# Patient Record
Sex: FEMALE | Race: WHITE | NOT HISPANIC OR LATINO | Employment: UNEMPLOYED | URBAN - METROPOLITAN AREA
[De-identification: names, ages, dates, MRNs, and addresses within clinical notes are randomized per-mention and may not be internally consistent; named-entity substitution may affect disease eponyms.]

---

## 2018-02-26 ENCOUNTER — OFFICE VISIT CONVERTED (OUTPATIENT)
Dept: FAMILY MEDICINE CLINIC | Facility: CLINIC | Age: 38
End: 2018-02-26
Attending: FAMILY MEDICINE

## 2018-07-13 ENCOUNTER — OFFICE VISIT CONVERTED (OUTPATIENT)
Dept: FAMILY MEDICINE CLINIC | Facility: CLINIC | Age: 38
End: 2018-07-13
Attending: FAMILY MEDICINE

## 2018-09-10 ENCOUNTER — OFFICE VISIT CONVERTED (OUTPATIENT)
Dept: SURGERY | Facility: CLINIC | Age: 38
End: 2018-09-10
Attending: PHYSICIAN ASSISTANT

## 2018-09-10 ENCOUNTER — CONVERSION ENCOUNTER (OUTPATIENT)
Dept: SURGERY | Facility: CLINIC | Age: 38
End: 2018-09-10

## 2018-10-24 ENCOUNTER — OFFICE VISIT CONVERTED (OUTPATIENT)
Dept: GASTROENTEROLOGY | Facility: CLINIC | Age: 38
End: 2018-10-24
Attending: NURSE PRACTITIONER

## 2018-11-01 ENCOUNTER — OFFICE VISIT CONVERTED (OUTPATIENT)
Dept: FAMILY MEDICINE CLINIC | Facility: CLINIC | Age: 38
End: 2018-11-01
Attending: FAMILY MEDICINE

## 2018-11-15 ENCOUNTER — OFFICE VISIT CONVERTED (OUTPATIENT)
Dept: FAMILY MEDICINE CLINIC | Facility: CLINIC | Age: 38
End: 2018-11-15
Attending: FAMILY MEDICINE

## 2018-12-07 ENCOUNTER — PROCEDURE VISIT CONVERTED (OUTPATIENT)
Dept: UROLOGY | Facility: CLINIC | Age: 38
End: 2018-12-07
Attending: UROLOGY

## 2019-01-11 ENCOUNTER — CONVERSION ENCOUNTER (OUTPATIENT)
Dept: FAMILY MEDICINE CLINIC | Facility: CLINIC | Age: 39
End: 2019-01-11

## 2019-01-11 ENCOUNTER — OFFICE VISIT CONVERTED (OUTPATIENT)
Dept: FAMILY MEDICINE CLINIC | Facility: CLINIC | Age: 39
End: 2019-01-11
Attending: FAMILY MEDICINE

## 2019-02-11 ENCOUNTER — OFFICE VISIT CONVERTED (OUTPATIENT)
Dept: FAMILY MEDICINE CLINIC | Facility: CLINIC | Age: 39
End: 2019-02-11
Attending: FAMILY MEDICINE

## 2019-02-13 ENCOUNTER — HOSPITAL ENCOUNTER (OUTPATIENT)
Dept: OTHER | Facility: HOSPITAL | Age: 39
Discharge: HOME | End: 2019-02-13
Attending: FAMILY MEDICINE

## 2019-03-18 ENCOUNTER — OFFICE VISIT CONVERTED (OUTPATIENT)
Dept: FAMILY MEDICINE CLINIC | Facility: CLINIC | Age: 39
End: 2019-03-18
Attending: FAMILY MEDICINE

## 2019-03-18 ENCOUNTER — HOSPITAL ENCOUNTER (OUTPATIENT)
Dept: FAMILY MEDICINE CLINIC | Facility: CLINIC | Age: 39
Discharge: HOME | End: 2019-03-18
Attending: FAMILY MEDICINE

## 2019-03-18 ENCOUNTER — CONVERSION ENCOUNTER (OUTPATIENT)
Dept: FAMILY MEDICINE CLINIC | Facility: CLINIC | Age: 39
End: 2019-03-18

## 2019-04-23 ENCOUNTER — CONVERSION ENCOUNTER (OUTPATIENT)
Dept: FAMILY MEDICINE CLINIC | Facility: CLINIC | Age: 39
End: 2019-04-23

## 2019-04-23 ENCOUNTER — OFFICE VISIT CONVERTED (OUTPATIENT)
Dept: FAMILY MEDICINE CLINIC | Facility: CLINIC | Age: 39
End: 2019-04-23
Attending: FAMILY MEDICINE

## 2019-06-17 ENCOUNTER — OFFICE VISIT CONVERTED (OUTPATIENT)
Dept: FAMILY MEDICINE CLINIC | Facility: CLINIC | Age: 39
End: 2019-06-17
Attending: NURSE PRACTITIONER

## 2019-07-22 ENCOUNTER — OFFICE VISIT CONVERTED (OUTPATIENT)
Dept: FAMILY MEDICINE CLINIC | Facility: CLINIC | Age: 39
End: 2019-07-22
Attending: NURSE PRACTITIONER

## 2019-07-22 ENCOUNTER — CONVERSION ENCOUNTER (OUTPATIENT)
Dept: FAMILY MEDICINE CLINIC | Facility: CLINIC | Age: 39
End: 2019-07-22

## 2019-07-23 ENCOUNTER — HOSPITAL ENCOUNTER (OUTPATIENT)
Dept: OTHER | Facility: HOSPITAL | Age: 39
Discharge: HOME | End: 2019-07-23
Attending: FAMILY MEDICINE

## 2019-08-09 ENCOUNTER — CONVERSION ENCOUNTER (OUTPATIENT)
Dept: ORTHOPEDIC SURGERY | Facility: CLINIC | Age: 39
End: 2019-08-09

## 2019-08-09 ENCOUNTER — OFFICE VISIT CONVERTED (OUTPATIENT)
Dept: ORTHOPEDIC SURGERY | Facility: CLINIC | Age: 39
End: 2019-08-09
Attending: ORTHOPAEDIC SURGERY

## 2019-09-11 ENCOUNTER — HOSPITAL ENCOUNTER (OUTPATIENT)
Dept: PERIOP | Facility: HOSPITAL | Age: 39
Setting detail: HOSPITAL OUTPATIENT SURGERY
Discharge: HOME | End: 2019-09-11
Attending: ORTHOPAEDIC SURGERY

## 2019-09-11 LAB — HCG UR QL: NEGATIVE

## 2019-09-16 ENCOUNTER — OFFICE VISIT CONVERTED (OUTPATIENT)
Dept: FAMILY MEDICINE CLINIC | Facility: CLINIC | Age: 39
End: 2019-09-16
Attending: FAMILY MEDICINE

## 2019-09-24 ENCOUNTER — OFFICE VISIT CONVERTED (OUTPATIENT)
Dept: ORTHOPEDIC SURGERY | Facility: CLINIC | Age: 39
End: 2019-09-24

## 2019-11-05 ENCOUNTER — CONVERSION ENCOUNTER (OUTPATIENT)
Dept: OTHER | Facility: HOSPITAL | Age: 39
End: 2019-11-05

## 2019-11-05 ENCOUNTER — OFFICE VISIT CONVERTED (OUTPATIENT)
Dept: FAMILY MEDICINE CLINIC | Facility: CLINIC | Age: 39
End: 2019-11-05
Attending: FAMILY MEDICINE

## 2019-12-10 ENCOUNTER — CONVERSION ENCOUNTER (OUTPATIENT)
Dept: FAMILY MEDICINE CLINIC | Facility: CLINIC | Age: 39
End: 2019-12-10

## 2019-12-10 ENCOUNTER — OFFICE VISIT CONVERTED (OUTPATIENT)
Dept: FAMILY MEDICINE CLINIC | Facility: CLINIC | Age: 39
End: 2019-12-10
Attending: FAMILY MEDICINE

## 2020-01-06 ENCOUNTER — HOSPITAL ENCOUNTER (OUTPATIENT)
Dept: GASTROENTEROLOGY | Facility: HOSPITAL | Age: 40
Setting detail: HOSPITAL OUTPATIENT SURGERY
Discharge: HOME | End: 2020-01-06
Attending: INTERNAL MEDICINE

## 2020-01-06 LAB — HCG UR QL: NEGATIVE

## 2020-03-16 ENCOUNTER — OFFICE VISIT CONVERTED (OUTPATIENT)
Dept: FAMILY MEDICINE CLINIC | Facility: CLINIC | Age: 40
End: 2020-03-16
Attending: FAMILY MEDICINE

## 2020-03-16 ENCOUNTER — CONVERSION ENCOUNTER (OUTPATIENT)
Dept: FAMILY MEDICINE CLINIC | Facility: CLINIC | Age: 40
End: 2020-03-16

## 2020-06-02 ENCOUNTER — HOSPITAL ENCOUNTER (OUTPATIENT)
Dept: FAMILY MEDICINE CLINIC | Facility: CLINIC | Age: 40
Discharge: HOME | End: 2020-06-02
Attending: FAMILY MEDICINE

## 2020-06-02 ENCOUNTER — OFFICE VISIT CONVERTED (OUTPATIENT)
Dept: FAMILY MEDICINE CLINIC | Facility: CLINIC | Age: 40
End: 2020-06-02
Attending: FAMILY MEDICINE

## 2020-06-02 LAB
ALBUMIN SERPL-MCNC: 4.3 G/DL (ref 3.5–5)
ALBUMIN/GLOB SERPL: 1.5 {RATIO} (ref 1.4–2.6)
ALP SERPL-CCNC: 95 U/L (ref 42–98)
ALT SERPL-CCNC: 11 U/L (ref 10–40)
ANION GAP SERPL CALC-SCNC: 17 MMOL/L (ref 8–19)
AST SERPL-CCNC: 17 U/L (ref 15–50)
BILIRUB SERPL-MCNC: 0.2 MG/DL (ref 0.2–1.3)
BUN SERPL-MCNC: 16 MG/DL (ref 5–25)
BUN/CREAT SERPL: 16 {RATIO} (ref 6–20)
CALCIUM SERPL-MCNC: 9.6 MG/DL (ref 8.7–10.4)
CHLORIDE SERPL-SCNC: 106 MMOL/L (ref 99–111)
CONV CO2: 21 MMOL/L (ref 22–32)
CONV TOTAL PROTEIN: 7.1 G/DL (ref 6.3–8.2)
CREAT UR-MCNC: 0.98 MG/DL (ref 0.5–0.9)
GFR SERPLBLD BASED ON 1.73 SQ M-ARVRAT: >60 ML/MIN/{1.73_M2}
GLOBULIN UR ELPH-MCNC: 2.8 G/DL (ref 2–3.5)
GLUCOSE SERPL-MCNC: 92 MG/DL (ref 65–99)
OSMOLALITY SERPL CALC.SUM OF ELEC: 289 MOSM/KG (ref 273–304)
POTASSIUM SERPL-SCNC: 4.6 MMOL/L (ref 3.5–5.3)
SODIUM SERPL-SCNC: 139 MMOL/L (ref 135–147)

## 2020-06-22 ENCOUNTER — OFFICE VISIT CONVERTED (OUTPATIENT)
Dept: FAMILY MEDICINE CLINIC | Facility: CLINIC | Age: 40
End: 2020-06-22
Attending: FAMILY MEDICINE

## 2020-07-20 ENCOUNTER — OFFICE VISIT CONVERTED (OUTPATIENT)
Dept: FAMILY MEDICINE CLINIC | Facility: CLINIC | Age: 40
End: 2020-07-20
Attending: FAMILY MEDICINE

## 2020-07-20 ENCOUNTER — CONVERSION ENCOUNTER (OUTPATIENT)
Dept: FAMILY MEDICINE CLINIC | Facility: CLINIC | Age: 40
End: 2020-07-20

## 2020-07-23 ENCOUNTER — HOSPITAL ENCOUNTER (OUTPATIENT)
Dept: OTHER | Facility: HOSPITAL | Age: 40
Discharge: HOME | End: 2020-07-23
Attending: FAMILY MEDICINE

## 2020-08-06 ENCOUNTER — HOSPITAL ENCOUNTER (OUTPATIENT)
Dept: URGENT CARE | Facility: CLINIC | Age: 40
Discharge: HOME | End: 2020-08-06
Attending: PHYSICIAN ASSISTANT

## 2020-08-07 ENCOUNTER — HOSPITAL ENCOUNTER (OUTPATIENT)
Dept: MRI IMAGING | Facility: HOSPITAL | Age: 40
Discharge: HOME | End: 2020-08-07
Attending: FAMILY MEDICINE

## 2020-08-30 ENCOUNTER — HOSPITAL ENCOUNTER (OUTPATIENT)
Dept: URGENT CARE | Facility: CLINIC | Age: 40
Discharge: HOME | End: 2020-08-30
Attending: NURSE PRACTITIONER

## 2020-09-01 LAB
AMPICILLIN SUSC ISLT: <=2
AMPICILLIN+SULBAC SUSC ISLT: <=2
BACTERIA UR CULT: ABNORMAL
CEFAZOLIN SUSC ISLT: <=4
CEFEPIME SUSC ISLT: <=0.12
CEFTAZIDIME SUSC ISLT: <=1
CEFTRIAXONE SUSC ISLT: <=0.25
CIPROFLOXACIN SUSC ISLT: <=0.25
ERTAPENEM SUSC ISLT: <=0.12
GENTAMICIN SUSC ISLT: <=1
LEVOFLOXACIN SUSC ISLT: <=0.12
NITROFURANTOIN SUSC ISLT: <=16
PIP+TAZO SUSC ISLT: <=4
TMP SMX SUSC ISLT: <=20
TOBRAMYCIN SUSC ISLT: <=1

## 2020-09-07 ENCOUNTER — HOSPITAL ENCOUNTER (OUTPATIENT)
Dept: URGENT CARE | Facility: CLINIC | Age: 40
Discharge: HOME | End: 2020-09-07
Attending: EMERGENCY MEDICINE

## 2020-09-09 LAB — BACTERIA SPEC AEROBE CULT: ABNORMAL

## 2020-09-10 ENCOUNTER — TELEPHONE CONVERTED (OUTPATIENT)
Dept: FAMILY MEDICINE CLINIC | Facility: CLINIC | Age: 40
End: 2020-09-10
Attending: FAMILY MEDICINE

## 2020-09-10 LAB — SARS-COV-2 RNA SPEC QL NAA+PROBE: NOT DETECTED

## 2020-10-08 ENCOUNTER — OFFICE VISIT CONVERTED (OUTPATIENT)
Dept: FAMILY MEDICINE CLINIC | Facility: CLINIC | Age: 40
End: 2020-10-08
Attending: FAMILY MEDICINE

## 2020-10-23 ENCOUNTER — OFFICE VISIT CONVERTED (OUTPATIENT)
Dept: FAMILY MEDICINE CLINIC | Facility: CLINIC | Age: 40
End: 2020-10-23
Attending: FAMILY MEDICINE

## 2020-10-23 ENCOUNTER — CONVERSION ENCOUNTER (OUTPATIENT)
Dept: FAMILY MEDICINE CLINIC | Facility: CLINIC | Age: 40
End: 2020-10-23

## 2020-11-19 ENCOUNTER — CONVERSION ENCOUNTER (OUTPATIENT)
Dept: FAMILY MEDICINE CLINIC | Facility: CLINIC | Age: 40
End: 2020-11-19

## 2020-11-19 ENCOUNTER — OFFICE VISIT CONVERTED (OUTPATIENT)
Dept: FAMILY MEDICINE CLINIC | Facility: CLINIC | Age: 40
End: 2020-11-19
Attending: FAMILY MEDICINE

## 2020-12-07 ENCOUNTER — TELEPHONE CONVERTED (OUTPATIENT)
Dept: FAMILY MEDICINE CLINIC | Facility: CLINIC | Age: 40
End: 2020-12-07
Attending: FAMILY MEDICINE

## 2020-12-15 ENCOUNTER — TELEMEDICINE CONVERTED (OUTPATIENT)
Dept: PSYCHIATRY | Facility: CLINIC | Age: 40
End: 2020-12-15
Attending: STUDENT IN AN ORGANIZED HEALTH CARE EDUCATION/TRAINING PROGRAM

## 2020-12-23 ENCOUNTER — TELEMEDICINE CONVERTED (OUTPATIENT)
Dept: PSYCHIATRY | Facility: CLINIC | Age: 40
End: 2020-12-23
Attending: STUDENT IN AN ORGANIZED HEALTH CARE EDUCATION/TRAINING PROGRAM

## 2021-01-06 ENCOUNTER — TELEMEDICINE CONVERTED (OUTPATIENT)
Dept: PSYCHIATRY | Facility: CLINIC | Age: 41
End: 2021-01-06
Attending: STUDENT IN AN ORGANIZED HEALTH CARE EDUCATION/TRAINING PROGRAM

## 2021-01-21 ENCOUNTER — TELEMEDICINE CONVERTED (OUTPATIENT)
Dept: PSYCHIATRY | Facility: CLINIC | Age: 41
End: 2021-01-21
Attending: STUDENT IN AN ORGANIZED HEALTH CARE EDUCATION/TRAINING PROGRAM

## 2021-02-12 ENCOUNTER — TELEMEDICINE CONVERTED (OUTPATIENT)
Dept: PSYCHIATRY | Facility: CLINIC | Age: 41
End: 2021-02-12
Attending: STUDENT IN AN ORGANIZED HEALTH CARE EDUCATION/TRAINING PROGRAM

## 2021-03-02 ENCOUNTER — OFFICE VISIT CONVERTED (OUTPATIENT)
Dept: FAMILY MEDICINE CLINIC | Facility: CLINIC | Age: 41
End: 2021-03-02
Attending: FAMILY MEDICINE

## 2021-03-11 ENCOUNTER — TELEMEDICINE CONVERTED (OUTPATIENT)
Dept: PSYCHIATRY | Facility: CLINIC | Age: 41
End: 2021-03-11
Attending: STUDENT IN AN ORGANIZED HEALTH CARE EDUCATION/TRAINING PROGRAM

## 2021-03-16 ENCOUNTER — HOSPITAL ENCOUNTER (OUTPATIENT)
Dept: FAMILY MEDICINE CLINIC | Facility: CLINIC | Age: 41
Discharge: HOME | End: 2021-03-16
Attending: FAMILY MEDICINE

## 2021-03-16 ENCOUNTER — CONVERSION ENCOUNTER (OUTPATIENT)
Dept: OTHER | Facility: HOSPITAL | Age: 41
End: 2021-03-16

## 2021-03-16 ENCOUNTER — OFFICE VISIT CONVERTED (OUTPATIENT)
Dept: FAMILY MEDICINE CLINIC | Facility: CLINIC | Age: 41
End: 2021-03-16
Attending: FAMILY MEDICINE

## 2021-03-16 LAB
BASOPHILS # BLD AUTO: 0.02 10*3/UL (ref 0–0.2)
BASOPHILS NFR BLD AUTO: 0.3 % (ref 0–3)
CONV ABS IMM GRAN: 0.02 10*3/UL (ref 0–0.2)
CONV IMMATURE GRAN: 0.3 % (ref 0–1.8)
DEPRECATED RDW RBC AUTO: 58.3 FL (ref 36.4–46.3)
EOSINOPHIL # BLD AUTO: 0.04 10*3/UL (ref 0–0.7)
EOSINOPHIL # BLD AUTO: 0.6 % (ref 0–7)
ERYTHROCYTE [DISTWIDTH] IN BLOOD BY AUTOMATED COUNT: 15.1 % (ref 11.7–14.4)
HCT VFR BLD AUTO: 39.3 % (ref 37–47)
HGB BLD-MCNC: 12.6 G/DL (ref 12–16)
INR PPP: 0.99 (ref 2–3)
LYMPHOCYTES # BLD AUTO: 1.8 10*3/UL (ref 1–5)
LYMPHOCYTES NFR BLD AUTO: 27.8 % (ref 20–45)
MCH RBC QN AUTO: 33.1 PG (ref 27–31)
MCHC RBC AUTO-ENTMCNC: 32.1 G/DL (ref 33–37)
MCV RBC AUTO: 103.1 FL (ref 81–99)
MONOCYTES # BLD AUTO: 0.42 10*3/UL (ref 0.2–1.2)
MONOCYTES NFR BLD AUTO: 6.5 % (ref 3–10)
NEUTROPHILS # BLD AUTO: 4.18 10*3/UL (ref 2–8)
NEUTROPHILS NFR BLD AUTO: 64.5 % (ref 30–85)
NRBC CBCN: 0 % (ref 0–0.7)
PLATELET # BLD AUTO: 254 10*3/UL (ref 130–400)
PMV BLD AUTO: 10.4 FL (ref 9.4–12.3)
PROTHROMBIN TIME: 10.9 S (ref 9.4–12)
RBC # BLD AUTO: 3.81 10*6/UL (ref 4.2–5.4)
T4 FREE SERPL-MCNC: 1.2 NG/DL (ref 0.9–1.8)
TSH SERPL-ACNC: 1.51 M[IU]/L (ref 0.27–4.2)
WBC # BLD AUTO: 6.48 10*3/UL (ref 4.8–10.8)

## 2021-03-21 ENCOUNTER — HOSPITAL ENCOUNTER (OUTPATIENT)
Dept: URGENT CARE | Facility: CLINIC | Age: 41
Discharge: HOME | End: 2021-03-21
Attending: EMERGENCY MEDICINE

## 2021-03-26 ENCOUNTER — OFFICE VISIT CONVERTED (OUTPATIENT)
Dept: FAMILY MEDICINE CLINIC | Facility: CLINIC | Age: 41
End: 2021-03-26
Attending: FAMILY MEDICINE

## 2021-04-06 ENCOUNTER — OFFICE VISIT CONVERTED (OUTPATIENT)
Dept: FAMILY MEDICINE CLINIC | Facility: CLINIC | Age: 41
End: 2021-04-06
Attending: FAMILY MEDICINE

## 2021-04-14 ENCOUNTER — TELEMEDICINE CONVERTED (OUTPATIENT)
Dept: PSYCHIATRY | Facility: CLINIC | Age: 41
End: 2021-04-14
Attending: STUDENT IN AN ORGANIZED HEALTH CARE EDUCATION/TRAINING PROGRAM

## 2021-04-16 ENCOUNTER — CONVERSION ENCOUNTER (OUTPATIENT)
Dept: FAMILY MEDICINE CLINIC | Facility: CLINIC | Age: 41
End: 2021-04-16

## 2021-04-16 ENCOUNTER — OFFICE VISIT CONVERTED (OUTPATIENT)
Dept: FAMILY MEDICINE CLINIC | Facility: CLINIC | Age: 41
End: 2021-04-16
Attending: FAMILY MEDICINE

## 2021-04-19 ENCOUNTER — CONVERSION ENCOUNTER (OUTPATIENT)
Dept: FAMILY MEDICINE CLINIC | Facility: CLINIC | Age: 41
End: 2021-04-19

## 2021-04-19 ENCOUNTER — OFFICE VISIT CONVERTED (OUTPATIENT)
Dept: FAMILY MEDICINE CLINIC | Facility: CLINIC | Age: 41
End: 2021-04-19
Attending: FAMILY MEDICINE

## 2021-04-28 ENCOUNTER — TELEMEDICINE CONVERTED (OUTPATIENT)
Dept: PSYCHIATRY | Facility: CLINIC | Age: 41
End: 2021-04-28
Attending: STUDENT IN AN ORGANIZED HEALTH CARE EDUCATION/TRAINING PROGRAM
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
## 2021-05-13 ENCOUNTER — TELEMEDICINE CONVERTED (OUTPATIENT)
Dept: PSYCHIATRY | Facility: CLINIC | Age: 41
End: 2021-05-13
Attending: STUDENT IN AN ORGANIZED HEALTH CARE EDUCATION/TRAINING PROGRAM

## 2021-05-13 NOTE — PROGRESS NOTES
Progress Note      Patient Name: Elly Barrett   Patient ID: [id number]   Sex: Female   YOB: 1980    Primary Care Provider: Sofie Ku MD   Referring Provider: Sofie Ku MD    Visit Date: 2020    Provider: Sofie Ku MD   Location: [facility]   Location Address: [address] [place] [address], [address] [place] [address]  [id number]   Location Phone: [phone]          Chief Complaint  · Grieving due to sudden loss of her       History Of Present Illness  Elly Barrett is a 39 year old /White female who presents for evaluation and treatment of:      Pt reports her   yesterday morning in bed.  She is very distraught and crying. Pt reports she can't eat or sleep and feels she was not able to help him even thought she was at home.  Pt daughter was also home during the time and is in the other patient room with the grandmother who drove into town to be with them.  Pt reports she doesn't know what she will do now and they were currently living with a friend and splitting the rent but now that her  is gone they will not be able to afford to cover their part of the rent.  I am giving them a resource packet for women and families and numbers to call.  I am also recommending grief counseling to help her with this difficult time. She assures me she will not harm herself even though she has thought about it.       Past Medical History  Abdominal pain; Allergies; Allergy, Unspecified; Anxiety; Asthma; Bipolar Disorder; Chronic idiopathic constipation; Constipation; Depression; Epigastric pain; Hemorrhoids, Unspecified, without Complications; Microscopic hematuria; Migraines; Nausea and vomiting; Reflux; Seasonal allergies; Tobacco abuse         Past Surgical History  Breast augmentation; Cesarian Section; EGD         Medication List  albuterol sulfate 2.5 mg /3 mL (0.083 %) inhalation solution for nebulization; Chantix Continuing Month Box  Pt slept most of day. GI cocktail given. Diet encouraged. 1 mg oral tablet; Depakote 125 mg oral tablet,delayed release (DR/EC); diazepam 5 mg oral tablet; fluticasone propionate 50 mcg/actuation nasal spray,suspension; Lamictal 200 mg oral tablet; montelukast 10 mg oral tablet; promethazine 25 mg oral tablet; quetiapine 200 mg oral tablet; Restoril 30 mg oral capsule; Spiriva Respimat 1.25 mcg/actuation inhalation mist         Allergy List  aspirin; Egg; Keflex; NSAIDS; PENICILLINS       Allergies Reconciled  Family Medical History  Heart Disease; CVA (Cerebrovascular accident); Diabetes, unspecified type; Osteoporosis; No family history of colorectal cancer; Family history of Arthritis         Reproductive History   0 Para 0 0 0 0       Social History  Alcohol Use (Current - status unknown); Claustophobic (Unknown); Homemaker.; lives with children; lives with spouse; ; .; Recreational Drug Use (Never); Tobacco (Current every day); Unemployed.         Immunizations  Name Date Admin   Influenza Patient had disease   Influenza Patient had disease         Review of Systems  · Constitutional  o Denies  o : fatigue, night sweats  · Eyes  o Denies  o : double vision, blurred vision  · HENT  o Denies  o : vertigo, recent head injury  · Breasts  o Denies  o : abnormal changes in breast size, additional breast symptoms except as noted in the HPI  · Cardiovascular  o Denies  o : chest pain, irregular heart beats  · Respiratory  o Denies  o : shortness of breath, productive cough  · Gastrointestinal  o Denies  o : nausea, vomiting  · Genitourinary  o Denies  o : dysuria, urinary retention  · Integument  o Denies  o : hair growth change, new skin lesions  · Neurologic  o Denies  o : altered mental status, seizures  · Musculoskeletal  o Denies  o : joint swelling, limitation of motion  · Endocrine  o Denies  o : cold intolerance, heat intolerance  · Psychiatric  o Admits  o : anxiety, depression, feeling confused, difficulty sleeping  · Heme-Lymph  o Denies  o :  "petechiae, lymph node enlargement or tenderness  · Allergic-Immunologic  o Denies  o : frequent illnesses      Vitals  Date Time BP Position Site L\R Cuff Size HR RR TEMP (F) WT  HT  BMI kg/m2 BSA m2 O2 Sat FR L/min FiO2 HC       10/08/2020 11:24 /70 Sitting    94 - R  98.5 151lbs 0oz 5'  7\" 23.65 1.8 96 %            Physical Examination  · Constitutional  o Appearance  o : well-nourished, in no acute distress  · Eyes  o Conjunctivae  o : conjunctivae normal  o Sclerae  o : sclerae white  o Pupils and Irises  o : pupils equal, round, and reactive to light and accommodation bilaterally  o Eyelids/Ocular Adnexae  o : eyelid appearance normal, no exudates present  · Ears, Nose, Mouth and Throat  o Ears  o :   § External Ears  § : external auditory canal appearance within normal limits, no discharge present  § Otoscopic Examination  § : tympanic membrane appearance within normal limits bilaterally  o Nose  o :   § External Nose  § : appearance normal  § Nasopharynx  § : no discharge present  o Oral Cavity  o :   § Oral Mucosa  § : oral mucosa normal  § Lips  § : lip appearance normal  o Throat  o :   § Oropharynx  § : no inflammation or lesions present, tonsils within normal limits  · Neck  o Inspection/Palpation  o : normal appearance, no masses or tenderness, trachea midline  o Range of Motion  o : cervical range of motion within normal limits  o Thyroid  o : gland size normal, nontender, no nodules or masses present on palpation  o Jugular Veins  o : JVP normal  · Respiratory  o Respiratory Effort  o : breathing unlabored  o Inspection of Chest  o : normal appearance  o Auscultation of Lungs  o : normal breath sounds throughout  · Cardiovascular  o Heart  o :   § Auscultation of Heart  § : regular rate and rhythm, no murmurs, gallops or rubs  o Peripheral Vascular System  o :   § Extremities  § : no edema  · Gastrointestinal  o Abdominal Examination  o : abdomen nontender to palpation, tone normal without " rigidity or guarding, no masses present, bowel sounds present in all four quadrants  o Liver and spleen  o : no hepatomegaly present, liver nontender to palpation, spleen not palpable  o Hernias  o : no hernias present  · Lymphatic  o Neck  o : no lymphadenopathy present  · Musculoskeletal  o Spine  o :   § Inspection/Palpation  § : no spinal tenderness, scoliosis or kyphosis present  § Stability  § : no subluxations present  § Range of Motion  § : spine range of motion normal  o Right Upper Extremity  o :   § Inspection/Palpation  § : no tenderness to palpation, ROM normal  o Left Upper Extremity  o :   § Inspection/Palpation  § : no tenderness to palpation, ROM normal  o Right Lower Extremity  o :   § Inspection/Palpation  § : no joint or limb tenderness to palpation, ROM normal  o Left Lower Extremity  o :   § Inspection/Palpation  § : no joint or limb tenderness to palpation, ROM normal  · Skin and Subcutaneous Tissue  o General Inspection  o : no rashes or lesions present, no areas of discoloration  o Body Hair  o : scalp palpation normal, hair normal for age, general body hair distribution normal for age  o Digits and Nails  o : no clubbing, cyanosis, deformities or edema present, normal appearing nails  · Neurologic  o Mental Status Examination  o :   § Orientation  § : grossly oriented to person, place and time  o Gait and Station  o : normal gait, able to stand without difficulty  · Psychiatric  o Judgement and Insight  o : judgment and insight intact  o Mood and Affect  o : mood normal, affect appropriate          Assessment  · Grieving     309.0/F43.21      Plan  · Orders  o ACO-39: Current medications updated and reviewed (, 1159F) - - 10/08/2020  o ACO-14: Influenza immunization was not administered for reasons documented [facility] () - - 10/08/2020   patient declined  · Medications  o Medications have been Reconciled  o Transition of Care or Provider Policy  · Instructions  o Patient was  educated/instructed on their diagnosis, treatment and medications prior to discharge from the clinic today.  o Minutes spent with patient including greater than 50% in Education/Counseling/Care Coordination.  o Time spent with the patient was minutes, more than 50% face to face. 25 minutes  · Disposition  o Return Visit Request in/on 2 weeks +/- 0 days ([address]).            Electronically Signed by: Sofie Ku MD -Author on October 23, 2020 08:27:08 AM

## 2021-05-13 NOTE — PROGRESS NOTES
"   Progress Note      Patient Name: Elly Barrett   Patient ID: [id number]   Sex: Female   YOB: 1980    Primary Care Provider: Sofie Ku MD   Referring Provider: Sofie Ku MD    Visit Date: December 7, 2020    Provider: Sofie Ku MD   Location: [facility] - [place], [place]   Location Address: [address] [place] [address], [address] [place] [address]  [id number]   Location Phone: [phone]          Chief Complaint  · Depression      History Of Present Illness  TELEHEALTH TELEPHONE VISIT  Elly Barrett is a 40 year old /White female who is presenting for evaluation via telehealth telephone visit. Verbal consent obtained before beginning visit.   Provider spent 25 minutes with patient during telehealth visit.   The following staff were present during this visit: Catherine Carpio   Past Medical History/Overview of Patient Symptoms     Pt reports she is hearing voices telling her to \"kill herself\".  Pt reports she would never do Anything like that because she would not want her daughter to find her body like she had to find her 's.  Patient reports that she is willing to get counseling and we are putting in a referral for her to go see the psychiatrist.  Patient reports that she will not do any harm to herself and that she had give away her guns.       Past Medical History  Abdominal Pain; Allergies; Allergy, Unspecified; Anxiety; Asthma; Bipolar Disorder; Chronic idiopathic constipation; Constipation; Depression; Epigastric pain; Hemorrhoids, Unspecified, without Complications; Microscopic hematuria; Migraines; Nausea and vomiting; Reflux; Seasonal allergies; Tobacco abuse         Past Surgical History  Breast augmentation; Cesarian Section; EGD         Medication List  albuterol sulfate 2.5 mg /3 mL (0.083 %) inhalation solution for nebulization; azithromycin 250 mg oral tablet; Chantix Continuing Month Box 1 mg oral tablet; Chantix Continuing Month Constantine 1 MG Oral Tablet; " fluticasone propionate 50 mcg/actuation nasal spray,suspension; lamotrigine 200 mg oral tablet; montelukast 10 mg oral tablet; promethazine 25 mg oral tablet; quetiapine 200 mg oral tablet; Restoril 30 mg oral capsule; Spiriva Respimat 1.25 mcg/actuation inhalation mist         Allergy List  aspirin; Egg; Keflex; NSAIDS; PENICILLINS         Family Medical History  Heart Disease; CVA (Cerebrovascular accident); Diabetes, unspecified type; Osteoporosis; No family history of colorectal cancer; Family history of Arthritis         Reproductive History   0 Para 0 0 0 0       Social History  Alcohol Use (Current - status unknown); Claustophobic (Unknown); Homemaker.; lives with children; lives with spouse; ; .; Recreational Drug Use (Never); Tobacco (Current every day); Unemployed.         Immunizations  Name Date Admin   Influenza Patient had disease   Influenza Patient had disease         Review of Systems  · Constitutional  o Denies  o : fatigue, fever, weight loss, weight gain  · Eyes  o Denies  o : eye discomfort, eye pain  · HENT  o Denies  o : vertigo, nasal congestion  · Genitourinary  o Denies  o : frequency, dysuria  · Integument  o Denies  o : rash, itching  · Neurologic  o Denies  o : muscular weakness, memory difficulties  · Musculoskeletal  o Denies  o : joint swelling, muscle pain  · Psychiatric  o Admits  o : anxiety, depression, hallucinations  · Heme-Lymph  o Denies  o : lightheadedness, easy bleeding  · Allergic-Immunologic  o Denies  o : sinus allergy symptoms, allergic dermatitis          Assessment  · Major depressive disorder     296.20/F32.2  · Hallucinations     780.1/R44.3      Plan  · Orders  o Psychiatric Consultation (PSYCH) - 296.20/F32.2 - 12/15/2020   pt should be scheduled with Dr. Dupree   o ACO-39: Current medications updated and reviewed (1159M, ) - - 12/15/2020  o Physician Telephone Evaluation, 21-30 minutes ([id number]) - 780.1/R44.3, 296.20/F32.2 -  12/15/2020  · Medications  o Medications have been Reconciled  o Transition of Care or Provider Policy  · Instructions  o Plan Of Care:   o Chronic conditions reviewed and taken into consideration for today's treatment plan.  · Disposition  o Call or Return if symptoms worsen or persist.  o Care Transition            Electronically Signed by: Sofie Ku MD -Author on December 15, 2020 04:58:47 PM

## 2021-05-13 NOTE — PROGRESS NOTES
"   Progress Note      Patient Name: Elly Barrett   Patient ID: [id number]   Sex: Female   YOB: 1980    Primary Care Provider: Sofie Ku MD   Referring Provider: Sofie Ku MD    Visit Date: June 2, 2020    Provider: Sofie Ku MD   Location: [facility]   Location Address: [address] [place] [address], [address] [place] [address]  [id number]   Location Phone: [phone]          Chief Complaint  · \"Mental Issues\"      History Of Present Illness  Elly Barrett is a 39 year old /White female who presents for evaluation and treatment of:      Bipolar depressionpatient reports that she has had more anxiety recently with her daughter acting out.  Patient has a teenage daughter who is in a relationship that is encouraging her to move to [place] and get .  Patient is been having daily arguments with her daughter which has increased her anxiety levels.  Patient reports that she is tried taking the Valium 10 mg but it makes her a little drowsy so we are decreasing her down to 5 mg as needed.    Asthma exacerbationpatient is wheezing on exam and reports that she needs to have a refill of her Xopenex for her nebulizer machine.  Patient also needs a prescription for tubing and mouthpiece.  I will be sending her antibiotic also to the pharmacy.       Past Medical History  Abdominal pain; Allergies; Allergy, Unspecified; Anxiety; Asthma; Bipolar Disorder; Chronic idiopathic constipation; Constipation; Depression; Epigastric pain; Hemorrhoids, Unspecified, without Complications; Microscopic hematuria; Migraines; Nausea and vomiting; Reflux; Seasonal allergies; Tobacco abuse         Past Surgical History  Breast augmentation; Cesarian Section; EGD         Medication List  Chantix Continuing Month Box 1 mg oral tablet; diazepam 5 mg oral tablet; fluticasone propionate 50 mcg/actuation nasal spray,suspension; Lamictal 200 mg oral tablet; montelukast 10 mg oral tablet; promethazine 25 mg oral " "tablet; quetiapine 200 mg oral tablet; Restoril 30 mg oral capsule         Allergy List  aspirin; Egg; Keflex; NSAIDS; PENICILLINS         Family Medical History  Heart Disease; CVA (Cerebrovascular accident); Diabetes, unspecified type; Osteoporosis; No family history of colorectal cancer; Family history of Arthritis         Reproductive History   0 Para 0 0 0 0       Social History  Alcohol Use (Current - status unknown); Claustophobic (Unknown); Homemaker.; lives with children; lives with spouse; ; .; Recreational Drug Use (Never); Tobacco (Current every day); Unemployed.         Immunizations  Name Date Admin   Influenza Patient had disease         Review of Systems  · Constitutional  o Denies  o : fatigue, fever, weight loss, weight gain  · Eyes  o Denies  o : eye discomfort, eye pain  · HENT  o Admits  o : ear fullness  o Denies  o : vertigo, nasal congestion  · Cardiovascular  o Denies  o : chest pain, irregular heart beats  · Respiratory  o Admits  o : wheezing  o Denies  o : shortness of breath, cough  · Gastrointestinal  o Denies  o : nausea, vomiting  · Genitourinary  o Denies  o : frequency, dysuria  · Integument  o Denies  o : rash, itching  · Neurologic  o Denies  o : muscular weakness, memory difficulties  · Musculoskeletal  o Denies  o : joint swelling, muscle pain  · Psychiatric  o Admits  o : anxiety, depression  · Heme-Lymph  o Denies  o : lightheadedness, easy bleeding  · Allergic-Immunologic  o Denies  o : sinus allergy symptoms, allergic dermatitis      Vitals  Date Time BP Position Site L\R Cuff Size HR RR TEMP (F) WT  HT  BMI kg/m2 BSA m2 O2 Sat        2020 08:45 /78 Sitting    97 - R 14 98 162lbs 8oz 5'  7\" 25.45 1.87 97 %          Physical Examination  · Constitutional  o Appearance  o : well-nourished, in no acute distress  · Eyes  o Conjunctivae  o : conjunctivae normal  o Sclerae  o : sclerae white  o Pupils and Irises  o : pupils equal, round, and " reactive to light and accommodation bilaterally  o Eyelids/Ocular Adnexae  o : eyelid appearance normal, no exudates present  · Ears, Nose, Mouth and Throat  o Ears  o :   § External Ears  § : external auditory canal appearance within normal limits, no discharge present  § Otoscopic Examination  § : fluid present behind tympanic membrane right ear  o Nose  o :   § External Nose  § : appearance normal  § Nasopharynx  § : no discharge present  o Oral Cavity  o :   § Oral Mucosa  § : oral mucosa normal  § Lips  § : lip appearance normal  o Throat  o :   § Oropharynx  § : no inflammation or lesions present, tonsils within normal limits  · Neck  o Inspection/Palpation  o : normal appearance, no masses or tenderness, trachea midline  o Range of Motion  o : cervical range of motion within normal limits  o Thyroid  o : gland size normal, nontender, no nodules or masses present on palpation  o Jugular Veins  o : JVP normal  · Respiratory  o Respiratory Effort  o : breathing unlabored  o Inspection of Chest  o : normal appearance  o Auscultation of Lungs  o : normal breath sounds throughout  · Cardiovascular  o Heart  o :   § Auscultation of Heart  § : regular rate and rhythm, no murmurs, gallops or rubs  o Peripheral Vascular System  o :   § Extremities  § : no edema  · Gastrointestinal  o Abdominal Examination  o : abdomen nontender to palpation, tone normal without rigidity or guarding, no masses present, bowel sounds present in all four quadrants  o Liver and spleen  o : no hepatomegaly present, liver nontender to palpation, spleen not palpable  o Hernias  o : no hernias present  · Lymphatic  o Neck  o : no lymphadenopathy present  · Musculoskeletal  o Spine  o :   § Inspection/Palpation  § : no spinal tenderness, scoliosis or kyphosis present  § Stability  § : no subluxations present  § Range of Motion  § : spine range of motion normal  o Right Upper Extremity  o :   § Inspection/Palpation  § : no tenderness to  palpation, ROM normal  o Left Upper Extremity  o :   § Inspection/Palpation  § : no tenderness to palpation, ROM normal  o Right Lower Extremity  o :   § Inspection/Palpation  § : no joint or limb tenderness to palpation, ROM normal  o Left Lower Extremity  o :   § Inspection/Palpation  § : no joint or limb tenderness to palpation, ROM normal  · Skin and Subcutaneous Tissue  o General Inspection  o : no rashes or lesions present, no areas of discoloration  o Body Hair  o : scalp palpation normal, hair normal for age, general body hair distribution normal for age  o Digits and Nails  o : no clubbing, cyanosis, deformities or edema present, normal appearing nails  · Neurologic  o Mental Status Examination  o :   § Orientation  § : grossly oriented to person, place and time  o Gait and Station  o : normal gait, able to stand without difficulty  · Psychiatric  o Judgement and Insight  o : judgment and insight intact  o Mood and Affect  o : mood normal, affect appropriate              Assessment  · Screening for depression     V79.0/Z13.89  · Bipolar Disorder     296.40  psychiatric care  · Anxiety     300.02/F41.1  · Asthma exacerbation     493.92/J45.901      Plan  · Orders  o CMP [facility] ([id number]) - 296.40, 300.02/F41.1 - 06/02/2020  o IM/SQ - Injection Fee [facility] ([id number]) - V79.0/Z13.89, 296.40, 300.02/F41.1, 493.92/J45.901 - 06/02/2020  o ACO-17: Screened for tobacco use AND received tobacco cessation intervention (4004F) - - 06/02/2020  o ACO-39: Current medications updated and reviewed () - - 06/02/2020  o ACO-18: Positive screen for clinical depression using a standardized tool and a follow-up plan documented () - - 06/02/2020  o ACO-14: Influenza immunization was not administered for reasons documented () - - 06/02/2020   declined  o Solumedrol 125 () - 493.92/J45.901 - 06/02/2020   LOT:[id number] EXP[facility]20[facility]   Injection - Solu-Medrol 125 mg; Dose: 125 mg; Site: Left  Gluteus; Route: intramuscular; Date: 06/02/2020 10:42:30; Exp: 02/01/2022; Lot: [id number]; Mfg: N/A; TradeName: Solu-Medrol; Location: [facility]; Administered By: Anna Ni MA; Comment: tolerated well  · Medications  o Xopenex 1.25 mg/3 mL inhalation solution for nebulization   SIG: inhale 3 milliliters (1.25 mg) by nebulization route 3 times per day for 30 days   DISP: (1) Box with 2 refills  Prescribed on 06/02/2020     o azithromycin 250 mg oral tablet   SIG: take 2 tablets (500 mg) by oral route once daily for 1 day then 1 tablet (250 mg) by oral route once daily for 4 days   DISP: (6) tablets with 0 refills  Prescribed on 06/02/2020     o Lamictal 200 mg oral tablet   SIG: take 2 tablets (400 mg) by oral route once daily for 30 days   DISP: (60) tablet with 11 refills  Prescribed on 06/02/2020     o diazepam 5 mg oral tablet   SIG: take 1 tablet by oral route 3 times a day for 30 days   DISP: (60) tablets with 2 refills  Adjusted on 06/02/2020     o quetiapine 200 mg oral tablet   SIG: TAKE 1 TABLET BY MOUTH ONCE DAILY AT BEDTIME FOR 90 DAYS for 90 days   DISP: (90) Each with 4 refills  Refilled on 06/02/2020     o Medications have been Reconciled  o Transition of Care or Provider Policy  · Instructions  o Depression Screen completed and scanned into the EMR under the designated folder within the patient's documents.  o Today's PHQ-9 result is _13__  o Patient was educated/instructed on their diagnosis, treatment and medications prior to discharge from the clinic today.  o Minutes spent with patient including greater than 50% in Education/Counseling/Care Coordination.  o Time spent with the patient was minutes, more than 50% face to face. 25 minutes  · Disposition  o f/u 3 months            Electronically Signed by: Sofie Ku MD -Author on June 9, 2020 11:17:59 AM

## 2021-05-13 NOTE — PROGRESS NOTES
Progress Note      Patient Name: Elly Barrett   Patient ID: [id number]   Sex: Female   YOB: 1980    Primary Care Provider: Sofie Ku MD   Referring Provider: Sofie Ku MD    Visit Date: September 10, 2020    Provider: Sofie Ku MD   Location: [facility] [place]   Location Address: [address] [place] [address], [address] [place] [address]  [id number]   Location Phone: [phone]          Chief Complaint  · Possible COVID symptoms      History Of Present Illness  TELEHEALTH TELEPHONE VISIT  Elly Barrett is a 39 year old /White female who is presenting for evaluation via telehealth telephone visit. Verbal consent obtained before beginning visit.   Provider spent 25 minutes with patient during telehealth visit.   The following staff were present during this visit: Catherine Carpio   Past Medical History/Overview of Patient Symptoms     She reports that she has went to [facility] on September 7, 2020 for diarrhea and sore throat.  Patient strep test was positive for group B strep she was started on azithromycin and reports that she still has some bloody diarrhea but has not felt the toilet.  She is dehydrated I encouraged her to use a brat diet to help slow down her diarrhea and hopefully with the completion of her antibiotics she will feel better.  Patient will follow-up as needed.  Pt COVID test was negative.       Past Medical History  Abdominal pain; Allergies; Allergy, Unspecified; Anxiety; Asthma; Bipolar Disorder; Chronic idiopathic constipation; Constipation; Depression; Epigastric pain; Hemorrhoids, Unspecified, without Complications; Microscopic hematuria; Migraines; Nausea and vomiting; Reflux; Seasonal allergies; Tobacco abuse         Past Surgical History  Breast augmentation; Cesarian Section; EGD         Medication List  albuterol sulfate 2.5 mg /3 mL (0.083 %) inhalation solution for nebulization; Chantix Continuing Month Box 1 mg oral tablet; diazepam 5 mg oral  tablet; fluticasone propionate 50 mcg/actuation nasal spray,suspension; Lamictal 200 mg oral tablet; montelukast 10 mg oral tablet; promethazine 25 mg oral tablet; quetiapine 200 mg oral tablet; Restoril 30 mg oral capsule; Spiriva Respimat 1.25 mcg/actuation inhalation mist         Allergy List  aspirin; Egg; Keflex; NSAIDS; PENICILLINS         Family Medical History  Heart Disease; CVA (Cerebrovascular accident); Diabetes, unspecified type; Osteoporosis; No family history of colorectal cancer; Family history of Arthritis         Reproductive History   0 Para 0 0 0 0       Social History  Alcohol Use (Current - status unknown); Claustophobic (Unknown); Homemaker.; lives with children; lives with spouse; ; .; Recreational Drug Use (Never); Tobacco (Current every day); Unemployed.         Immunizations  Name Date Admin   Influenza Patient had disease         Review of Systems  · Constitutional  o Denies  o : fatigue, fever, weight loss, weight gain  · Eyes  o Denies  o : eye discomfort, eye pain  · HENT  o Denies  o : vertigo, nasal congestion  · Cardiovascular  o Denies  o : chest pain, irregular heart beats  · Respiratory  o Denies  o : shortness of breath, wheezing  · Gastrointestinal  o Admits  o : diarrhea  o Denies  o : nausea  · Genitourinary  o Denies  o : frequency, dysuria  · Integument  o Denies  o : rash, itching  · Neurologic  o Denies  o : muscular weakness, memory difficulties  · Musculoskeletal  o Denies  o : joint swelling, muscle pain  · Psychiatric  o Denies  o : anxiety, depression  · Heme-Lymph  o Denies  o : lightheadedness, easy bleeding  · Allergic-Immunologic  o Denies  o : sinus allergy symptoms, allergic dermatitis          Assessment  · Diarrhea     787.91/R19.7  · Group B streptococcal infection     041.02/A49.1      Plan  · Orders  o ACO-39: Current medications updated and reviewed () - - 09/10/2020  o Physician Telephone Evaluation, 21-30 minutes ([id number]) -  041.02/A49.1, 787.91/R19.7 - 09/10/2020  · Instructions  o Plan Of Care:   o Chronic conditions reviewed and taken into consideration for today's treatment plan.  o Discussed Covid-19 precautions including, but not limited to, social distancing, avoid touching your face, and hand washing.   · Disposition  o Call or Return if symptoms worsen or persist.            Electronically Signed by: Sofie Ku MD -Author on September 10, 2020 04:15:36 PM

## 2021-05-13 NOTE — PROGRESS NOTES
Progress Note      Patient Name: Elly Barrett   Patient ID: [id number]   Sex: Female   YOB: 1980    Primary Care Provider: Sofie Ku MD   Referring Provider: Sofie Ku MD    Visit Date: October 23, 2020    Provider: Sofie Ku MD   Location: [facility]   Location Address: [address] [place] [address], [address] [place] [address]  [id number]   Location Phone: [phone]          Chief Complaint  · Chest Congestion      History Of Present Illness  Elly Barrett is a 39 year old /White female who presents for evaluation and treatment of:      Patient reports that she still continues to have chest congestion after having strep throat.  She reports that she is coughing a lot in the morning and taking her coffee helps with her breathing.  I will be sending her a prescription for antibiotics since it appears she may have COPD exacerbation so she did live in the home with a smoker and currently smokes her self.    Grieving - pt reports she is still grieving but she is doing better.  She is still living in the same home but she is getting more and more uncomfortable with the person they share the home with.  I am recommending she applies for low income housing.       Past Medical History  Abdominal pain; Allergies; Allergy, Unspecified; Anxiety; Asthma; Bipolar Disorder; Chronic idiopathic constipation; Constipation; Depression; Epigastric pain; Hemorrhoids, Unspecified, without Complications; Microscopic hematuria; Migraines; Nausea and vomiting; Reflux; Seasonal allergies; Tobacco abuse         Past Surgical History  Breast augmentation; Cesarian Section; EGD         Medication List  albuterol sulfate 2.5 mg /3 mL (0.083 %) inhalation solution for nebulization; Chantix Continuing Month Box 1 mg oral tablet; Depakote 125 mg oral tablet,delayed release (DR/EC); diazepam 5 mg oral tablet; fluticasone propionate 50 mcg/actuation nasal spray,suspension; Lamictal 200 mg  "oral tablet; montelukast 10 mg oral tablet; promethazine 25 mg oral tablet; quetiapine 200 mg oral tablet; Restoril 30 mg oral capsule; Spiriva Respimat 1.25 mcg/actuation inhalation mist         Allergy List  aspirin; Egg; Keflex; NSAIDS; PENICILLINS       Allergies Reconciled  Family Medical History  Heart Disease; CVA (Cerebrovascular accident); Diabetes, unspecified type; Osteoporosis; No family history of colorectal cancer; Family history of Arthritis         Reproductive History   0 Para 0 0 0 0       Social History  Alcohol Use (Current - status unknown); Claustophobic (Unknown); Homemaker.; lives with children; lives with spouse; ; .; Recreational Drug Use (Never); Tobacco (Current every day); Unemployed.         Immunizations  Name Date Admin   Influenza Patient had disease   Influenza Patient had disease         Review of Systems  · Constitutional  o Denies  o : fatigue, fever, weight loss, weight gain  · Eyes  o Denies  o : eye discomfort, eye pain  · HENT  o Denies  o : vertigo, nasal congestion  · Cardiovascular  o Denies  o : chest pain, irregular heart beats  · Respiratory  o Admits  o : wheezing, cough  o Denies  o : shortness of breath  · Gastrointestinal  o Denies  o : nausea, vomiting  · Genitourinary  o Denies  o : frequency, dysuria  · Integument  o Denies  o : rash, itching  · Neurologic  o Denies  o : muscular weakness, memory difficulties  · Musculoskeletal  o Denies  o : joint swelling, muscle pain  · Psychiatric  o Denies  o : anxiety, depression  · Heme-Lymph  o Denies  o : lightheadedness, easy bleeding  · Allergic-Immunologic  o Denies  o : sinus allergy symptoms, allergic dermatitis      Vitals  Date Time BP Position Site L\R Cuff Size HR RR TEMP (F) WT  HT  BMI kg/m2 BSA m2 O2 Sat FR L/min FiO2        10/23/2020 09:22 /82 Sitting    92 - R 14 98 149lbs 0oz 5'  7\" 23.34 1.79 98 %            Physical Examination  · Constitutional  o Appearance  o : " well-nourished, in no acute distress  · Eyes  o Conjunctivae  o : conjunctivae normal  o Sclerae  o : sclerae white  o Pupils and Irises  o : pupils equal, round, and reactive to light and accommodation bilaterally  o Eyelids/Ocular Adnexae  o : eyelid appearance normal, no exudates present  · Ears, Nose, Mouth and Throat  o Ears  o :   § External Ears  § : external auditory canal appearance within normal limits, no discharge present  § Otoscopic Examination  § : tympanic membrane appearance within normal limits bilaterally  o Nose  o :   § External Nose  § : appearance normal  § Nasopharynx  § : no discharge present  o Oral Cavity  o :   § Oral Mucosa  § : oral mucosa normal  § Lips  § : lip appearance normal  o Throat  o :   § Oropharynx  § : no inflammation or lesions present, tonsils within normal limits  · Neck  o Inspection/Palpation  o : normal appearance, no masses or tenderness, trachea midline  o Range of Motion  o : cervical range of motion within normal limits  o Thyroid  o : gland size normal, nontender, no nodules or masses present on palpation  o Jugular Veins  o : JVP normal  · Respiratory  o Respiratory Effort  o : breathing unlabored  o Inspection of Chest  o : normal appearance  o Auscultation of Lungs  o : wheezing present   · Cardiovascular  o Heart  o :   § Auscultation of Heart  § : regular rate and rhythm, no murmurs, gallops or rubs  o Peripheral Vascular System  o :   § Extremities  § : no edema  · Gastrointestinal  o Abdominal Examination  o : abdomen nontender to palpation, tone normal without rigidity or guarding, no masses present, bowel sounds present in all four quadrants  o Liver and spleen  o : no hepatomegaly present, liver nontender to palpation, spleen not palpable  o Hernias  o : no hernias present  · Lymphatic  o Neck  o : no lymphadenopathy present  · Musculoskeletal  o Spine  o :   § Inspection/Palpation  § : no spinal tenderness, scoliosis or kyphosis  present  § Stability  § : no subluxations present  § Range of Motion  § : spine range of motion normal  o Right Upper Extremity  o :   § Inspection/Palpation  § : no tenderness to palpation, ROM normal  o Left Upper Extremity  o :   § Inspection/Palpation  § : no tenderness to palpation, ROM normal  o Right Lower Extremity  o :   § Inspection/Palpation  § : no joint or limb tenderness to palpation, ROM normal  o Left Lower Extremity  o :   § Inspection/Palpation  § : no joint or limb tenderness to palpation, ROM normal  · Skin and Subcutaneous Tissue  o General Inspection  o : no rashes or lesions present, no areas of discoloration  o Body Hair  o : scalp palpation normal, hair normal for age, general body hair distribution normal for age  o Digits and Nails  o : no clubbing, cyanosis, deformities or edema present, normal appearing nails  · Neurologic  o Mental Status Examination  o :   § Orientation  § : grossly oriented to person, place and time  o Gait and Station  o : normal gait, able to stand without difficulty  · Psychiatric  o Judgement and Insight  o : judgment and insight intact  o Mood and Affect  o : mood normal, affect appropriate              Assessment  · COPD (chronic obstructive pulmonary disease)     496/J44.9  · Cough     786.2/R05  · Screening for depression     V79.0/Z13.89  · COPD exacerbation     491.21/J44.1      Plan  · Orders  o ACO-39: Current medications updated and reviewed (1159F, ) - - 10/23/2020  o ACO-18: Positive screen for clinical depression using a standardized tool and a follow-up plan documented () - - 10/23/2020  · Medications  o azithromycin 250 mg oral tablet   SIG: take 2 tablets (500 mg) by oral route once daily for 1 day then 1 tablet (250 mg) by oral route once daily for 4 days   DISP: (6) Tablet with 0 refills  Prescribed on 10/23/2020     o Medications have been Reconciled  o Transition of Care or Provider Policy  · Instructions  o Depression Screen completed  and scanned into the EMR under the designated folder within the patient's documents.  o Today's PHQ-9 result is _23__  o Patient was educated/instructed on their diagnosis, treatment and medications prior to discharge from the clinic today.  o Minutes spent with patient including greater than 50% in Education/Counseling/Care Coordination.  o Time spent with the patient was minutes, more than 50% face to face. 25 minutes  · Disposition  o f/u 1 month            Electronically Signed by: Sofie Ku MD -Author on October 26, 2020 07:14:55 AM

## 2021-05-13 NOTE — PROGRESS NOTES
Progress Note      Patient Name: Elly Barrett   Patient ID: [id number]   Sex: Female   YOB: 1980    Primary Care Provider: Sofie Ku MD   Referring Provider: Sofie Ku MD    Visit Date: November 19, 2020    Provider: Sofie Ku MD   Location: [facility]   Location Address: [address] [place] [address], [address] [place] [address]  [id number]   Location Phone: [phone]          Chief Complaint  · Bipolar Follow up      History Of Present Illness  Elly Barrett is a 40 year old /White female who presents for evaluation and treatment of:      Bipolar Depression- pt has recently lost her insurance so we are trying to find medication options that will be available on $4 list to help keep her on her medications.  Pt stopped taking Depakote because it was making her shaky.       Past Medical History  Abdominal pain; Allergies; Allergy, Unspecified; Anxiety; Asthma; Bipolar Disorder; Chronic idiopathic constipation; Constipation; Depression; Epigastric pain; Hemorrhoids, Unspecified, without Complications; Microscopic hematuria; Migraines; Nausea and vomiting; Reflux; Seasonal allergies; Tobacco abuse         Past Surgical History  Breast augmentation; Cesarian Section; EGD         Medication List  albuterol sulfate 2.5 mg /3 mL (0.083 %) inhalation solution for nebulization; azithromycin 250 mg oral tablet; Chantix Continuing Month Box 1 mg oral tablet; Chantix Continuing Month [place]ine 1 MG Oral Tablet; diazepam 5 mg oral tablet; fluticasone propionate 50 mcg/actuation nasal spray,suspension; Lamictal 200 mg oral tablet; montelukast 10 mg oral tablet; promethazine 25 mg oral tablet; quetiapine 200 mg oral tablet; Restoril 30 mg oral capsule; Spiriva Respimat 1.25 mcg/actuation inhalation mist         Allergy List  aspirin; Egg; Keflex; NSAIDS; PENICILLINS       Allergies Reconciled  Family Medical History  Heart Disease; CVA (Cerebrovascular accident); Diabetes,  "unspecified type; Osteoporosis; No family history of colorectal cancer; Family history of Arthritis         Reproductive History   0 Para 0 0 0 0       Social History  Alcohol Use (Current - status unknown); Claustophobic (Unknown); Homemaker.; lives with children; lives with spouse; ; .; Recreational Drug Use (Never); Tobacco (Current every day); Unemployed.         Immunizations  Name Date Admin   Influenza Patient had disease   Influenza Patient had disease         Review of Systems  · Constitutional  o Admits  o : weight loss  o Denies  o : fatigue, fever, weight gain  · Eyes  o Denies  o : eye discomfort, eye pain  · HENT  o Denies  o : vertigo, nasal congestion  · Respiratory  o Denies  o : shortness of breath, wheezing  · Gastrointestinal  o Denies  o : nausea, vomiting  · Genitourinary  o Denies  o : frequency, dysuria  · Integument  o Denies  o : rash, itching  · Neurologic  o Denies  o : muscular weakness, memory difficulties  · Musculoskeletal  o Denies  o : joint swelling, muscle pain  · Psychiatric  o Admits  o : anxiety, depression, difficulty sleeping  · Heme-Lymph  o Denies  o : lightheadedness, easy bleeding  · Allergic-Immunologic  o Denies  o : sinus allergy symptoms, allergic dermatitis      Vitals  Date Time BP Position Site L\R Cuff Size HR RR TEMP (F) WT  HT  BMI kg/m2 BSA m2 O2 Sat FR L/min FiO2        2020 10:00 /68 Sitting    103 - R 14 98.4 143lbs 2oz 5'  7\" 22.42 1.75 98 %            Physical Examination  · Constitutional  o Appearance  o : well-nourished, in no acute distress  · Eyes  o Conjunctivae  o : conjunctivae normal  o Sclerae  o : sclerae white  o Pupils and Irises  o : pupils equal, round, and reactive to light and accommodation bilaterally  o Eyelids/Ocular Adnexae  o : eyelid appearance normal, no exudates present  · Ears, Nose, Mouth and Throat  o Ears  o :   § External Ears  § : external auditory canal appearance within normal limits, no " discharge present  § Otoscopic Examination  § : tympanic membrane appearance within normal limits bilaterally  o Nose  o :   § External Nose  § : appearance normal  § Nasopharynx  § : no discharge present  o Oral Cavity  o :   § Oral Mucosa  § : oral mucosa normal  § Lips  § : lip appearance normal  o Throat  o :   § Oropharynx  § : no inflammation or lesions present, tonsils within normal limits  · Neck  o Inspection/Palpation  o : normal appearance, no masses or tenderness, trachea midline  o Range of Motion  o : cervical range of motion within normal limits  o Thyroid  o : gland size normal, nontender, no nodules or masses present on palpation  o Jugular Veins  o : JVP normal  · Respiratory  o Respiratory Effort  o : breathing unlabored  o Inspection of Chest  o : normal appearance  o Auscultation of Lungs  o : normal breath sounds throughout  · Cardiovascular  o Heart  o :   § Auscultation of Heart  § : regular rate and rhythm, no murmurs, gallops or rubs  o Peripheral Vascular System  o :   § Extremities  § : no edema  · Gastrointestinal  o Abdominal Examination  o : abdomen nontender to palpation, tone normal without rigidity or guarding, no masses present, bowel sounds present in all four quadrants  o Liver and spleen  o : no hepatomegaly present, liver nontender to palpation, spleen not palpable  o Hernias  o : no hernias present  · Lymphatic  o Neck  o : no lymphadenopathy present  · Musculoskeletal  o Spine  o :   § Inspection/Palpation  § : no spinal tenderness, scoliosis or kyphosis present  § Stability  § : no subluxations present  § Range of Motion  § : spine range of motion normal  o Right Upper Extremity  o :   § Inspection/Palpation  § : no tenderness to palpation, ROM normal  o Left Upper Extremity  o :   § Inspection/Palpation  § : no tenderness to palpation, ROM normal  o Right Lower Extremity  o :   § Inspection/Palpation  § : no joint or limb tenderness to palpation, ROM normal  o Left Lower  Extremity  o :   § Inspection/Palpation  § : no joint or limb tenderness to palpation, ROM normal  · Skin and Subcutaneous Tissue  o General Inspection  o : no rashes or lesions present, no areas of discoloration  o Body Hair  o : scalp palpation normal, hair normal for age, general body hair distribution normal for age  o Digits and Nails  o : no clubbing, cyanosis, deformities or edema present, normal appearing nails  · Neurologic  o Mental Status Examination  o :   § Orientation  § : grossly oriented to person, place and time  o Gait and Station  o : normal gait, able to stand without difficulty  · Psychiatric  o Judgement and Insight  o : judgment and insight intact  o Mood and Affect  o : mood normal, affect appropriate              Assessment  · Insomnia, unspecified     780.52/G47.00  · Bipolar disorder     296.80/F31.9      Plan  · Orders  o ACO-39: Current medications updated and reviewed (1159F, ) - - 11/19/2020  o ACO-14: Influenza immunization was not administered for reasons documented [facility] () - - 11/19/2020   declined  · Medications  o lamotrigine 200 mg oral tablet   SIG: take 2 tablets (400 mg) by oral route once daily for 30 days   DISP: (60) Tablet with 11 refills  Adjusted on 11/19/2020     o quetiapine 200 mg oral tablet   SIG: take 1 tablet by oral route once a day (at bedtime) for 30 days   DISP: (30) Each with 11 refills  Adjusted on 11/19/2020     o Restoril 30 mg oral capsule   SIG: take 1 capsule (30 mg) by oral route once daily at bedtime as needed for 30 days   DISP: (30) Capsule with 5 refills  Refilled on 11/19/2020     o Spiriva Respimat 1.25 mcg/actuation inhalation mist   SIG: inhale 2 puffs (2.5 mcg) by inhalation route once daily for 30 days   DISP: (60) Inhaler with 11 refills  Refilled on 11/19/2020     o diazepam 5 mg oral tablet   SIG: take 1 tablet by oral route 3 times a day for 30 days   DISP: (60) tablets with 2 refills  Discontinued on 11/19/2020     o Medications  have been Reconciled  o Transition of Care or Provider Policy  · Instructions  o Patient was educated/instructed on their diagnosis, treatment and medications prior to discharge from the clinic today.  o Minutes spent with patient including greater than 50% in Education/Counseling/Care Coordination.  o Time spent with the patient was minutes, more than 50% face to face. 25 minutes  · Disposition  o Call or Return if symptoms worsen or persist.            Electronically Signed by: Sofie Ku MD -Author on November 19, 2020 11:15:52 AM

## 2021-05-13 NOTE — PROGRESS NOTES
Progress Note      Patient Name: Elly Barrett   Patient ID: [id number]   Sex: Female   YOB: 1980    Primary Care Provider: Sofie Ku MD   Referring Provider: Sofie Ku MD    Visit Date: July 20, 2020    Provider: Sofie Ku MD   Location: [facility]   Location Address: [address] [place] [address], [address]  [id number]   Location Phone: [phone]          Chief Complaint  · Counseling      History Of Present Illness  Elly Barrett is a 39 year old /White female who presents for evaluation and treatment of:      Patient is currently here with her daughter for counseling with home issues.  Patient and her daughter have a history confrontation and ongoing mostly related to her current boyfriend.  Patient reports that her and her daughter are not as close as they used to be and a lot of that has changed since COVID-19.  Patient reports that her daughter stays in her room a lot and then she also herself states that her own room where they are not really interacting.  Patient does report that he used to go out and more as mother and daughter getting the nails done but because of her work schedule and her daughter's work schedule. I encouraged the patient that we have to plan time together so that they can continue to go about since this is the patient's daughter's last year in high school.  Is important that they were able to discuss difficult issues.       Past Medical History  Abdominal pain; Allergies; Allergy, Unspecified; Anxiety; Asthma; Bipolar Disorder; Chronic idiopathic constipation; Constipation; Depression; Epigastric pain; Hemorrhoids, Unspecified, without Complications; Microscopic hematuria; Migraines; Nausea and vomiting; Reflux; Seasonal allergies; Tobacco abuse         Past Surgical History  Breast augmentation; Cesarian Section; EGD         Medication List  albuterol sulfate 2.5 mg /3 mL (0.083 %) inhalation solution for nebulization; Chantix  "Continuing Month Box 1 mg oral tablet; diazepam 5 mg oral tablet; fluticasone propionate 50 mcg/actuation nasal spray,suspension; Lamictal 200 mg oral tablet; montelukast 10 mg oral tablet; promethazine 25 mg oral tablet; quetiapine 200 mg oral tablet; Restoril 30 mg oral capsule; Spiriva Respimat 1.25 mcg/actuation inhalation mist         Allergy List  aspirin; Egg; Keflex; NSAIDS; PENICILLINS         Family Medical History  Heart Disease; CVA (Cerebrovascular accident); Diabetes, unspecified type; Osteoporosis; No family history of colorectal cancer; Family history of Arthritis         Reproductive History   0 Para 0 0 0 0       Social History  Alcohol Use (Current - status unknown); Claustophobic (Unknown); Homemaker.; lives with children; lives with spouse; ; .; Recreational Drug Use (Never); Tobacco (Current every day); Unemployed.         Immunizations  Name Date Admin   Influenza Patient had disease         Review of Systems  · Constitutional  o Denies  o : fatigue, fever, weight loss, weight gain  · Eyes  o Denies  o : eye discomfort, eye pain  · HENT  o Denies  o : vertigo, nasal congestion  · Cardiovascular  o Denies  o : chest pain, irregular heart beats  · Genitourinary  o Denies  o : frequency, dysuria  · Integument  o Denies  o : rash, itching  · Neurologic  o Denies  o : muscular weakness, memory difficulties  · Musculoskeletal  o Denies  o : joint swelling, muscle pain  · Psychiatric  o Admits  o : anxiety  o Denies  o : depression  · Heme-Lymph  o Denies  o : lightheadedness, easy bleeding  · Allergic-Immunologic  o Denies  o : sinus allergy symptoms, allergic dermatitis      Vitals  Date Time BP Position Site L\R Cuff Size HR RR TEMP (F) WT  HT  BMI kg/m2 BSA m2 O2 Sat        2020 09:12 /78 Sitting    87 - R 14 97.5 156lbs 16oz 5'  7\" 24.59 1.83 97 %          Physical Examination  · Constitutional  o Appearance  o : well-nourished, in no acute " distress  · Eyes  o Conjunctivae  o : conjunctivae normal  o Sclerae  o : sclerae white  o Pupils and Irises  o : pupils equal, round, and reactive to light and accommodation bilaterally  o Eyelids/Ocular Adnexae  o : eyelid appearance normal, no exudates present  · Ears, Nose, Mouth and Throat  o Ears  o :   § External Ears  § : external auditory canal appearance within normal limits, no discharge present  § Otoscopic Examination  § : tympanic membrane appearance within normal limits bilaterally  o Nose  o :   § External Nose  § : appearance normal  § Nasopharynx  § : no discharge present  o Oral Cavity  o :   § Oral Mucosa  § : oral mucosa normal  § Lips  § : lip appearance normal  o Throat  o :   § Oropharynx  § : no inflammation or lesions present, tonsils within normal limits  · Neck  o Range of Motion  o : cervical range of motion within normal limits  · Respiratory  o Respiratory Effort  o : breathing unlabored  o Inspection of Chest  o : normal appearance  o Auscultation of Lungs  o : normal breath sounds throughout  · Cardiovascular  o Heart  o :   § Auscultation of Heart  § : regular rate and rhythm, no murmurs, gallops or rubs  o Peripheral Vascular System  o :   § Extremities  § : no edema  · Gastrointestinal  o Abdominal Examination  o : abdomen nontender to palpation, tone normal without rigidity or guarding, no masses present, bowel sounds present in all four quadrants  · Lymphatic  o Neck  o : no lymphadenopathy present  · Musculoskeletal  o Spine  o :   § Inspection/Palpation  § : no spinal tenderness, scoliosis or kyphosis present  § Stability  § : no subluxations present  § Range of Motion  § : spine range of motion normal  o Right Upper Extremity  o :   § Inspection/Palpation  § : no tenderness to palpation, ROM normal  o Left Upper Extremity  o :   § Inspection/Palpation  § : no tenderness to palpation, ROM normal  o Right Lower Extremity  o :   § Inspection/Palpation  § : no joint or limb  tenderness to palpation, ROM normal  o Left Lower Extremity  o :   § Inspection/Palpation  § : no joint or limb tenderness to palpation, ROM normal  · Skin and Subcutaneous Tissue  o General Inspection  o : no rashes or lesions present, no areas of discoloration  o Body Hair  o : scalp palpation normal, hair normal for age, general body hair distribution normal for age  o Digits and Nails  o : no clubbing, cyanosis, deformities or edema present, normal appearing nails  · Neurologic  o Mental Status Examination  o :   § Orientation  § : grossly oriented to person, place and time  o Gait and Station  o : normal gait, able to stand without difficulty  · Psychiatric  o Judgement and Insight  o : judgment and insight intact  o Mood and Affect  o : mood normal, affect appropriate              Assessment  · Anxiety disorder     300.00/F41.9  · Family conflict     V61.9/Z63.8      Plan  · Orders  o ACO-17: Screened for tobacco use AND received tobacco cessation intervention (4004F) - - 07/20/2020  o ACO-39: Current medications updated and reviewed () - - 07/20/2020  o ACO-14: Influenza immunization was not administered for reasons documented () - - 07/20/2020  · Medications  o Medications have been Reconciled  o Transition of Care or Provider Policy  · Instructions  o Patient was educated/instructed on their diagnosis, treatment and medications prior to discharge from the clinic today.  o Minutes spent with patient including greater than 50% in Education/Counseling/Care Coordination.  o Time spent with the patient was minutes, more than 50% face to face. 35 minutes            Electronically Signed by: Sofie Ku MD -Author on July 20, 2020 12:23:17 PM

## 2021-05-13 NOTE — PROGRESS NOTES
Progress Note      Patient Name: Elly Barrett   Patient ID: [id number]   Sex: Female   YOB: 1980    Primary Care Provider: Sofie Ku MD   Referring Provider: Sofie Ku MD    Visit Date: June 22, 2020    Provider: Sofie Ku MD   Location: [facility]   Location Address: [address] [place] [address], [address]  [id number]   Location Phone: [phone]          Chief Complaint  · Asthma      History Of Present Illness  Elly Barrett is a 39 year old /White female who presents for evaluation and treatment of:      Asthmapatient reports that she has had to use her rescue inhaler multiple times and needs to have the albuterol versus the Xopenex.  Patient reports that the Xopenex and ibuprofen as well she is been having more wheezing.  I will be starting her on Spiriva 2 puffs once a day to control her asthma symptoms.  Patient on exam with wheezing.  I am sending her prescription for albuterol nebulizer medication.  Patient can discontinue using Xopenex.    Ear fullnesspatient reports that she continues to have bilateral ear fullness.  Patient has a history of having PE tubes put in her ears to help relieve the pressure which have since fallen out.  Patient reports that now she still feels pressure in her ears.  On exam there is a small amount of fluid at the bottom of the tympanic membrane on the right and half full fluid in each tympanic membrane on the left.    Anxiety disorderpatient teenage daughter is still acting out and patient reports that her and her  have just decided to leave alone and allow the patient to deal with that for her last senior year without arguing.  I will be seen in her daughter for follow-up next week.       Past Medical History  Abdominal pain; Allergies; Allergy, Unspecified; Anxiety; Asthma; Bipolar Disorder; Chronic idiopathic constipation; Constipation; Depression; Epigastric pain; Hemorrhoids, Unspecified, without  Complications; Microscopic hematuria; Migraines; Nausea and vomiting; Reflux; Seasonal allergies; Tobacco abuse         Past Surgical History  Breast augmentation; Cesarian Section; EGD         Medication List  Chantix Continuing Month Box 1 mg oral tablet; diazepam 5 mg oral tablet; fluticasone propionate 50 mcg/actuation nasal spray,suspension; Lamictal 200 mg oral tablet; montelukast 10 mg oral tablet; promethazine 25 mg oral tablet; quetiapine 200 mg oral tablet; Restoril 30 mg oral capsule; Xopenex 1.25 mg/3 mL inhalation solution for nebulization         Allergy List  aspirin; Egg; Keflex; NSAIDS; PENICILLINS         Family Medical History  Heart Disease; CVA (Cerebrovascular accident); Diabetes, unspecified type; Osteoporosis; No family history of colorectal cancer; Family history of Arthritis         Reproductive History   0 Para 0 0 0 0       Social History  Alcohol Use (Current - status unknown); Claustophobic (Unknown); Homemaker.; lives with children; lives with spouse; ; .; Recreational Drug Use (Never); Tobacco (Current every day); Unemployed.         Immunizations  Name Date Admin   Influenza Patient had disease         Review of Systems  · Constitutional  o Denies  o : fatigue, fever, weight loss, weight gain  · Eyes  o Denies  o : eye discomfort, eye pain  · HENT  o Denies  o : vertigo, nasal congestion  · Cardiovascular  o Denies  o : chest pain, irregular heart beats  · Respiratory  o Admits  o : wheezing  o Denies  o : shortness of breath  · Gastrointestinal  o Denies  o : nausea, vomiting  · Genitourinary  o Denies  o : frequency, dysuria  · Integument  o Denies  o : rash, itching  · Neurologic  o Denies  o : muscular weakness, memory difficulties  · Musculoskeletal  o Denies  o : joint swelling, muscle pain  · Psychiatric  o Admits  o : anxiety  o Denies  o : depression  · Heme-Lymph  o Denies  o : lightheadedness, easy bleeding  · Allergic-Immunologic  o Denies  o : sinus  "allergy symptoms, allergic dermatitis      Vitals  Date Time BP Position Site L\R Cuff Size HR RR TEMP (F) WT  HT  BMI kg/m2 BSA m2 O2 Sat HC       06/22/2020 03:40 /68 Sitting    92 - R 14 99.3 114lbs 4oz 5'  7\" 17.89 1.57 96 %          Physical Examination  · Constitutional  o Appearance  o : well-nourished, in no acute distress  · Eyes  o Conjunctivae  o : conjunctivae normal  o Sclerae  o : sclerae white  o Pupils and Irises  o : pupils equal, round, and reactive to light and accommodation bilaterally  o Eyelids/Ocular Adnexae  o : eyelid appearance normal, no exudates present  · Ears, Nose, Mouth and Throat  o Ears  o :   § External Ears  § : external auditory canal appearance within normal limits, no discharge present  § Otoscopic Examination  § : tympanic membrane appearance within normal limits bilaterally  o Nose  o :   § External Nose  § : appearance normal  § Nasopharynx  § : no discharge present  o Oral Cavity  o :   § Oral Mucosa  § : oral mucosa normal  § Lips  § : lip appearance normal  o Throat  o :   § Oropharynx  § : no inflammation or lesions present, tonsils within normal limits  · Neck  o Inspection/Palpation  o : normal appearance, no masses or tenderness, trachea midline  o Range of Motion  o : cervical range of motion within normal limits  o Thyroid  o : gland size normal, nontender, no nodules or masses present on palpation  o Jugular Veins  o : JVP normal  · Respiratory  o Respiratory Effort  o : breathing unlabored  o Inspection of Chest  o : normal appearance  o Auscultation of Lungs  o : normal breath sounds throughout  · Cardiovascular  o Heart  o :   § Auscultation of Heart  § : regular rate and rhythm, no murmurs, gallops or rubs  o Peripheral Vascular System  o :   § Extremities  § : no edema  · Gastrointestinal  o Abdominal Examination  o : abdomen nontender to palpation, tone normal without rigidity or guarding, no masses present, bowel sounds present in all four " quadrants  o Liver and spleen  o : no hepatomegaly present, liver nontender to palpation, spleen not palpable  o Hernias  o : no hernias present  · Lymphatic  o Neck  o : no lymphadenopathy present  · Musculoskeletal  o Spine  o :   § Inspection/Palpation  § : no spinal tenderness, scoliosis or kyphosis present  § Stability  § : no subluxations present  § Range of Motion  § : spine range of motion normal  o Right Upper Extremity  o :   § Inspection/Palpation  § : no tenderness to palpation, ROM normal  o Left Upper Extremity  o :   § Inspection/Palpation  § : no tenderness to palpation, ROM normal  o Right Lower Extremity  o :   § Inspection/Palpation  § : no joint or limb tenderness to palpation, ROM normal  o Left Lower Extremity  o :   § Inspection/Palpation  § : no joint or limb tenderness to palpation, ROM normal  · Skin and Subcutaneous Tissue  o General Inspection  o : no rashes or lesions present, no areas of discoloration  o Body Hair  o : scalp palpation normal, hair normal for age, general body hair distribution normal for age  o Digits and Nails  o : no clubbing, cyanosis, deformities or edema present, normal appearing nails  · Neurologic  o Mental Status Examination  o :   § Orientation  § : grossly oriented to person, place and time  o Gait and Station  o : normal gait, able to stand without difficulty  · Psychiatric  o Judgement and Insight  o : judgment and insight intact  o Mood and Affect  o : mood normal, affect appropriate          Assessment  · Anxiety disorder     300.00/F41.9  · Asthma     493.90/J45.909  · Ear fullness, bilateral     388.8/H93.8X3    Problems Reconciled  Plan  · Orders  o ACO-39: Current medications updated and reviewed () - - 06/22/2020  o ACO-14: Influenza immunization was not administered for reasons documented () - - 06/22/2020  · Medications  o Spiriva Respimat 1.25 mcg/actuation inhalation mist   SIG: inhale 2 puffs (2.5 mcg) by inhalation route once daily for  30 days   DISP: (60) Mists with 11 refills  Prescribed on 06/22/2020     o albuterol sulfate 2.5 mg /3 mL (0.083 %) inhalation solution for nebulization   SIG: inhale 3 milliliters (2.5 mg) by nebulization route 3 times per day as needed for 30 days   DISP: (1) Box with 2 refills  Prescribed on 06/22/2020     o Xopenex 1.25 mg/3 mL inhalation solution for nebulization   SIG: inhale 3 milliliters (1.25 mg) by nebulization route 3 times per day for 30 days   DISP: (1) Box with 2 refills  Discontinued on 06/22/2020     o Medications have been Reconciled  o Transition of Care or Provider Policy  · Instructions  o Patient was educated/instructed on their diagnosis, treatment and medications prior to discharge from the clinic today.  o Minutes spent with patient including greater than 50% in Education/Counseling/Care Coordination.  o Time spent with the patient was minutes, more than 50% face to face. 25 minutes  · Disposition  o f/u 3 months            Electronically Signed by: Sofie Ku MD -Author on June 22, 2020 04:16:04 PM

## 2021-05-14 ENCOUNTER — HOSPITAL ENCOUNTER (OUTPATIENT)
Dept: FAMILY MEDICINE CLINIC | Facility: CLINIC | Age: 41
Discharge: HOME | End: 2021-05-14
Attending: FAMILY MEDICINE

## 2021-05-14 ENCOUNTER — CONVERSION ENCOUNTER (OUTPATIENT)
Dept: FAMILY MEDICINE CLINIC | Facility: CLINIC | Age: 41
End: 2021-05-14

## 2021-05-14 ENCOUNTER — OFFICE VISIT CONVERTED (OUTPATIENT)
Dept: FAMILY MEDICINE CLINIC | Facility: CLINIC | Age: 41
End: 2021-05-14
Attending: FAMILY MEDICINE

## 2021-05-14 VITALS
SYSTOLIC BLOOD PRESSURE: 128 MMHG | HEIGHT: 67 IN | BODY MASS INDEX: 20.91 KG/M2 | WEIGHT: 133.25 LBS | DIASTOLIC BLOOD PRESSURE: 74 MMHG | HEART RATE: 87 BPM | OXYGEN SATURATION: 99 % | TEMPERATURE: 98.1 F

## 2021-05-14 VITALS
DIASTOLIC BLOOD PRESSURE: 72 MMHG | SYSTOLIC BLOOD PRESSURE: 120 MMHG | HEART RATE: 81 BPM | OXYGEN SATURATION: 97 % | TEMPERATURE: 97.8 F | WEIGHT: 136 LBS | BODY MASS INDEX: 21.35 KG/M2 | HEIGHT: 67 IN

## 2021-05-14 VITALS
WEIGHT: 134.12 LBS | TEMPERATURE: 97.9 F | DIASTOLIC BLOOD PRESSURE: 74 MMHG | HEIGHT: 67 IN | HEART RATE: 75 BPM | OXYGEN SATURATION: 97 % | BODY MASS INDEX: 21.05 KG/M2 | SYSTOLIC BLOOD PRESSURE: 128 MMHG

## 2021-05-14 VITALS
DIASTOLIC BLOOD PRESSURE: 70 MMHG | TEMPERATURE: 98.5 F | BODY MASS INDEX: 23.7 KG/M2 | HEIGHT: 67 IN | HEART RATE: 94 BPM | OXYGEN SATURATION: 96 % | SYSTOLIC BLOOD PRESSURE: 140 MMHG | WEIGHT: 151 LBS

## 2021-05-14 VITALS
HEIGHT: 67 IN | TEMPERATURE: 97.1 F | HEART RATE: 101 BPM | DIASTOLIC BLOOD PRESSURE: 70 MMHG | RESPIRATION RATE: 14 BRPM | OXYGEN SATURATION: 97 % | BODY MASS INDEX: 20.88 KG/M2 | SYSTOLIC BLOOD PRESSURE: 118 MMHG | WEIGHT: 133 LBS

## 2021-05-14 VITALS
SYSTOLIC BLOOD PRESSURE: 130 MMHG | BODY MASS INDEX: 21.83 KG/M2 | DIASTOLIC BLOOD PRESSURE: 70 MMHG | HEIGHT: 67 IN | OXYGEN SATURATION: 98 % | WEIGHT: 139.12 LBS | HEART RATE: 63 BPM | TEMPERATURE: 98 F

## 2021-05-14 VITALS
HEART RATE: 103 BPM | SYSTOLIC BLOOD PRESSURE: 130 MMHG | DIASTOLIC BLOOD PRESSURE: 68 MMHG | WEIGHT: 143.12 LBS | RESPIRATION RATE: 14 BRPM | HEIGHT: 67 IN | TEMPERATURE: 98.4 F | BODY MASS INDEX: 22.46 KG/M2 | OXYGEN SATURATION: 98 %

## 2021-05-14 VITALS
WEIGHT: 129.5 LBS | RESPIRATION RATE: 14 BRPM | OXYGEN SATURATION: 98 % | HEIGHT: 67 IN | HEART RATE: 73 BPM | SYSTOLIC BLOOD PRESSURE: 110 MMHG | DIASTOLIC BLOOD PRESSURE: 70 MMHG | BODY MASS INDEX: 20.33 KG/M2 | TEMPERATURE: 98 F

## 2021-05-14 VITALS
RESPIRATION RATE: 14 BRPM | BODY MASS INDEX: 23.39 KG/M2 | HEART RATE: 92 BPM | DIASTOLIC BLOOD PRESSURE: 82 MMHG | HEIGHT: 67 IN | SYSTOLIC BLOOD PRESSURE: 130 MMHG | WEIGHT: 149 LBS | OXYGEN SATURATION: 98 % | TEMPERATURE: 98 F

## 2021-05-14 LAB
AMPHET UR QL CFM: NEGATIVE
BARBITURATES UR QL: NEGATIVE
BENZODIAZ UR QL SCN: POSITIVE
BILIRUB UR QL STRIP: NORMAL
COLOR UR: NORMAL
CONV AMP/METHAMP UR: NEGATIVE
CONV CLARITY OF URINE: CLEAR
CONV COCAINE, UR: NEGATIVE
CONV PROTEIN IN URINE BY AUTOMATED TEST STRIP: NORMAL
CONV UROBILINOGEN IN URINE BY AUTOMATED TEST STRIP: NORMAL
GLUCOSE UR QL: NEGATIVE
HGB UR QL STRIP: NORMAL
KETONES UR QL STRIP: NORMAL
LEUKOCYTE ESTERASE UR QL STRIP: NEGATIVE
MDMA UR QL SCN: NEGATIVE
METHADONE UR QL SCN: NEGATIVE
NITRITE UR QL STRIP: NEGATIVE
OPIATES UR QL SCN: NEGATIVE
OXYCODONE UR QL SCN: NEGATIVE
PCP UR QL: NEGATIVE
PH UR STRIP.AUTO: 7 [PH]
SP GR UR: 1.02
THC SERPLBLD CFM-MCNC: NEGATIVE NG/ML

## 2021-05-14 NOTE — PROGRESS NOTES
"   Progress Note      Patient Name: Elly Barrett   Patient ID: [id number]   Sex: Female   YOB: 1980    Primary Care Provider: Sofie Ku MD   Referring Provider: Sofie Ku MD    Visit Date: December 23, 2020    Provider: Stanley Quinones MD   Location: [facility]   Location Address: [address] [place] [address]  [address] [place] [address]  [id number]   Location Phone: [phone]          Chief Complaint     \"I slept really good and hard last night.\"           History Of Present Illness  Elly Barrett is a 40 year old /White female who presents to the office today referred by Sofie Ku MD.   Chart review 12/15: Seen November 19th. Patient has a history of bipolar disorder. Lost her insurance, so they are trying to find medications that are covered on the $4 medication list. Discontinued Depakote because it made her \"shaky.\" She is on lamotrigine 400 mg p.o. daily, quetiapine 200 nightly, Restoril 30 mg nightly. No longer on diazepam 5 mg 3 times daily. June labs: LFTs within normal limits electrolytes within normal limits creatinine is 0.98. EKG 2018 shows heart rate 72, , sinus. MRI 2017 without contrast for headache shows no acute, with a few small foci of nonspecific gliosis which could be related to migraine disorder or early minimal chronic small vessel ischemic change.   Virtual visit via Zoom audio and video due to the COVID-19 pandemic. Patient is accepting of and agreeable to appointment. The appointment consisted of the patient and I only. Interview: Patient sleeping well for the first time since we spoke. She took 800 mg of Seroquel. Felt really groggy this morning, and feels like maybe she took too much Seroquel. She had no out of body experiences, denies SI HI AVH. I informed her that we are on the right track. Patient reports her mood is \"a little bit better.\" She was in her car with her friend Rola.   ...   ...   ...       Past Medical " History  Disease Name Date Onset Notes   Abdominal Pain --  --    Allergies --  --    Allergy, Unspecified --  allergies   Anxiety --  --    Asthma --  --    Attention Deficit Hyperactivity Disorder --  --    Bipolar Disorder --  psychiatric care   Chronic idiopathic constipation --  --    Constipation --  --    Depression --  --    Epigastric pain --  --    Frequent headaches --  --    Hallucinations --  --    Hemorrhoids, Unspecified, without Complications --  --    Microscopic hematuria 09/10/2018 --    Migraines --  --    Nausea and vomiting --  --    Reflux --  --    Seasonal allergies --  --    Suicidal thoughts --  --    Tobacco abuse --  --          Past Surgical History  Procedure Name Date Notes   Breast augmentation --  --    Cesarian Section 2003 --    EGD 2018 [facility]         Medication List  Name Date Started Instructions   albuterol sulfate 2.5 mg /3 mL (0.083 %) inhalation solution for nebulization 06/22/2020 inhale 3 milliliters (2.5 mg) by nebulization route 3 times per day as needed for 30 days   hydroxyzine HCl 25 mg oral tablet 12/16/2020 TAKE 1 TABLET BY MOUTH THREE TIMES DAILY AS NEEDED FOR 30 DAYS   lamotrigine 200 mg oral tablet 11/19/2020 take 2 tablets (400 mg) by oral route once daily for 30 days   montelukast 10 mg oral tablet 03/16/2020 TAKE 1 TABLET BY MOUTH ONCE DAILY IN THE EVENING FOR 90 DAYS   promethazine 25 mg oral tablet 09/18/2020 Take 1 tablet by mouth once daily for 30 days   quetiapine 200 mg oral tablet 12/21/2020 take 3 tablets by oral route once a day (at bedtime) for 30 days   Restoril 30 mg oral capsule 11/19/2020 take 1 capsule (30 mg) by oral route once daily at bedtime as needed for 30 days   Spiriva Respimat 1.25 mcg/actuation inhalation mist 11/19/2020 inhale 2 puffs (2.5 mcg) by inhalation route once daily for 30 days   trazodone 100 mg oral tablet 12/21/2020 take 1 tablet (100 mg) by oral route once daily at bedtime for 60 days         Allergy List  Allergen Name  Date Reaction Notes   aspirin --  --  --    Egg --  --  --    Keflex --  --  Blisters on face   NSAIDS --  --  --    PENICILLINS --  --  --        Allergies Reconciled  Family Medical History  Disease Name Relative/Age Notes   Heart Disease Father/   Father  Mother   CVA (Cerebrovascular accident) Father/   --    Diabetes, unspecified type Mother/   Mother   Osteoporosis Father/  Mother/   Mother; Father   No family history of colorectal cancer  --    Family history of Arthritis Father/   Father         Reproductive History  Menstrual   Last Menstrual Period: 2015   Pregnancy Summary   Total Pregnancies: 0 Full Term: 0 Premature: 0   Ab Induced: 0 Ab Spontaneous: 0 Ectopics: 0   Multiples: 0 Livin         Social History  Finding Status Start/Stop Quantity Notes   Alcohol Never --/-- --  12/15/2020 -    Alcohol Use Current - status unknown --/-- --  less than 1 drink per day   Claustophobic Unknown --/-- --  yes   Homemaker. --  --/-- --  --    lives with children --  --/-- --  --    lives with spouse --  --/-- --  --     --  --/-- --  --    . --  --/-- --  --    Recreational Drug Use Never --/-- --  12/15/2020 - no   Tobacco Current every day --/-- 1.5 PPD current every day smoker, 1.5 packs per day, smoked 11-20 years  current every day smoker, 1 packs per day, smoked 11-20 years   Unemployed. --  --/-- --  --          Immunizations  NameDate Admin Mfg Trade Name Lot Number Route Inj VIS Given VIS Publication   InfluenzaHad Disease  NE Not Entered  NE NE     Comments: declined         Review of Systems  · Constitutional  o Admits  o : fatigue  o Denies  o : night sweats  · Eyes  o Denies  o : double vision, blurred vision  · HENT  o Denies  o : vertigo, recent head injury  · Breasts  o Denies  o : abnormal changes in breast size, additional breast symptoms except as noted in the HPI  · Cardiovascular  o Admits  o : irregular heart beats  o Denies  o : chest  "pain  · Respiratory  o Denies  o : shortness of breath, productive cough  · Gastrointestinal  o Denies  o : nausea, vomiting  · Genitourinary  o Denies  o : dysuria, urinary retention  · Integument  o Denies  o : hair growth change, new skin lesions  · Neurologic  o Admits  o : altered mental status  o Denies  o : seizures  · Musculoskeletal  o Denies  o : joint swelling, limitation of motion  · Endocrine  o Denies  o : cold intolerance, heat intolerance  · Heme-Lymph  o Denies  o : petechiae, lymph node enlargement or tenderness  · Allergic-Immunologic  o Denies  o : frequent illnesses      Physical Examination  · Mental Status Exam  o Appearance  o : good eye contact, normal street clothes, groomed, sitting in the  seat of her car  o Behavior  o : pleasant and cooperative  o Motor  o : No abnormal  o Speech  o : normal rhythm, rate, volume, tone, not hyperverbal, not pressured, normal prosidy  o Mood  o : \"A little bit better\"  o Affect  o : Slight constriction, almost euthymic, able to smile  o Thought Content  o : negative suicidal ideations, negative homicidal ideations, negative obsessions  o Perceptions  o : negative auditory hallucinations, negative visual hallucinations  o Thought Process  o : linear  o Insight/Judgement  o : fair/fair  o Cognition  o : grossly intact  o Attention  o : intact          Assessment  · Tobacco abuse counseling       Tobacco abuse counseling     V65.42/Z71.6  · Anxiety     300.02/F41.1  · Depression     296.31  · Tobacco abuse     305.1/Z72.0       Presentation most consistent with likely major depressive disorder, most recent episode severe with psychotic features, as well as complicated bereavement. Rule out personality disorder.    Patient has improved.  We are on the right track.    Continue increased dose of quetiapine 600 mg p.o. nightly.  Add melatonin 3 mg p.o. nightly.  Continue other medications as scheduled.    Follow-up in a week and a half.    Patient declines " psychotherapy.       Plan  · Orders  o ACO-39: Current medications updated and reviewed (1159F, ) - - 12/23/2020  · Medications  o Medications have been Reconciled  o Transition of Care or Provider Policy  · Instructions  o Tobacco and smoking cessation counseling for more than 3 minutes was completed.  o Patient understands to call 911 or go to the emergency room if for any reason she has thoughts of hurting herself or other people.  o Risk Assessment: Risk of self-harm acutely is high, not imminent. Risk factors include severe recent psychosocial stressor, mood disorder, access to weapons, history of self-harm and suicide attempt, recent self-harm. Protective factors include no family history, no present SI, minimal AODA, healthcare seeking, future orientation, willingness to engage in care, support from her daughter. Risk of self-harm chronically is also high, but could be further elevated in the event of treatment noncompliance and/or AODA.  o Safety: No acute safety concerns.  o Medications: START melatonin 3 mg PO QHS. CONTINUE Restoril 30 mg p.o. daily, lamotrigine 400 mg p.o. daily, quetiapine 600 mg PO QHS to target depression, poor appetite, hallucinations. Risks, benefits, alternatives discussed with patient including nausea and vomiting, GI upset, sedation, akathisia, hypotension, increased appetite, lowering of seizure threshold, theoretical risk of tardive dyskinesia. After discussion of these risks and benefits, the patient voiced understanding and agreed to proceed.  o Therapy: Declines.  o Follow Up: 1.5 week.  · Disposition  o Follow Up in 2 weeks.            Electronically Signed by: Stanley Quinones MD -Author on December 23, 2020 02:42:02 PM

## 2021-05-14 NOTE — PROGRESS NOTES
Progress Note      Patient Name: Elly Barrett   Patient ID: [id number]   Sex: Female   YOB: 1980    Primary Care Provider: Sofie Ku MD   Referring Provider: Sofie Ku MD    Visit Date: March 2, 2021    Provider: Sofie Ku MD   Location: [facility]   Location Address: [address] [place] [address], [address] [place] [address]  [id number]   Location Phone: [phone]          Chief Complaint  · Bipolar Disorder  · Griveing      History Of Present Illness  Elly Barrett is a 40 year old /White female who presents for evaluation and treatment of:      Pt is still grieving about the death of her . Patient is tearful in the office today and response that she does not know what she is going to do.  Patient is not compliant with the medication regimen that she was started on with Dr. Quinones.  Patient is supposed to be taking 800 mg of Seroquel but reports that she is not.  Patient also was supposed to be taking Lunesta for sleep but she reports that she is not taking it.  Patient reports that she did try it but did not like it.  Patient is asking for something to help her sleep which we discussed possibly starting Belsomra but after further discussion with the patient I had informed her that I would talk to  first to go over the best medication options for her so that we all can be on the same page with her care.  I also recommended that she talk to [facility] grief counselors about what is going on with her grieving process.  Patient does not have a support system.  She does not have friends or family that she can talk to about the situation that happened with her .  Patient daughter is a teenager and memory someone if she can confide in.  Patient reports that she feels very alone.  Patient reports that she is having manic episodes where she is up cleaning her house 4 days and then just crashes for 2 days asleep.  Patient is not able to tolerate food  at all and reports that she vomits everything that she eats and can only tolerate liquids.  Patient is having diarrhea due to the fact that she is not having any food to eat and has been losing weight.      Physician to physician consultationDr. Quinones and I discussed the patient's current condition and state and noncompliance with her medications.  After discussing her symptoms I informed him that I was starting her on scopolamine patch to help her with the nausea and he recommended that she start on olanzapine 10 mg once at night to help not only with the manic episodes but also with sleep and to help increase her appetite.  We are hoping that this will help to balance out her symptoms and allow her to be able to maintain a healthy diet and weight.  Patient will continue to follow-up with Dr. Quinones to help with her Psychiatric medication management.    1:20pm I called patient to inform her of the plan of care About starting on olanzapine 10 mg at night to help with her symptoms.  Patient agreed that she had to do something to help with sleep because of night she felt like she is going to die.  I informed the patient that if she does not get better with this medication that she may need to be inpatient treatment to get better and she agreed that she would definitely think about doing inpatient because she does not know anything else to do if this does not work.           Past Medical History  Abdominal Pain; Allergies; Allergy, Unspecified; Anxiety; Asthma; Attention Deficit Hyperactivity Disorder; Bipolar Disorder; Chronic idiopathic constipation; Constipation; Depression; Depression (emotion); Epigastric pain; Frequent headaches; Hallucinations; Hemorrhoids, Unspecified, without Complications; Insomnia; Microscopic hematuria; Migraines; Nausea and vomiting; Reflux; Seasonal allergies; Suicidal thoughts; Tobacco abuse         Past Surgical History  Breast augmentation; Cesarian Section; EGD         Medication  List  lamotrigine 200 mg oral tablet; promethazine 25 mg oral tablet; Restoril 15 mg oral capsule         Allergy List  aspirin; Egg; Keflex; NSAIDS; PENICILLINS       Allergies Reconciled  Family Medical History  Heart Disease; CVA (Cerebrovascular accident); Diabetes, unspecified type; Osteoporosis; No family history of colorectal cancer; Family history of Arthritis         Reproductive History   0 Para 0 0 0 0       Social History  Alcohol (Never); Alcohol Use (Current - status unknown); Claustophobic (Unknown); Homemaker.; lives with children; lives with spouse; ; .; Recreational Drug Use (Never); Tobacco (Current every day); Unemployed.         Immunizations  Name Date Admin   Influenza Patient had disease   Influenza Patient had disease         Review of Systems  · Constitutional  o Admits  o : weight loss  o Denies  o : fatigue, night sweats  · Eyes  o Denies  o : double vision, blurred vision  · HENT  o Denies  o : vertigo, recent head injury  · Breasts  o Denies  o : abnormal changes in breast size, additional breast symptoms except as noted in the HPI  · Cardiovascular  o Denies  o : chest pain, irregular heart beats  · Respiratory  o Denies  o : shortness of breath, productive cough  · Gastrointestinal  o Admits  o : nausea, vomiting, diarrhea  · Genitourinary  o Denies  o : dysuria, urinary retention  · Integument  o Denies  o : hair growth change, new skin lesions  · Neurologic  o Denies  o : altered mental status, seizures  · Musculoskeletal  o Denies  o : joint swelling, limitation of motion  · Endocrine  o Denies  o : cold intolerance, heat intolerance  · Psychiatric  o Admits  o : depression, difficulty sleeping  · Heme-Lymph  o Denies  o : petechiae, lymph node enlargement or tenderness  · Allergic-Immunologic  o Denies  o : frequent illnesses      Vitals  Date Time BP Position Site L\R Cuff Size HR RR TEMP (F) WT  HT  BMI kg/m2 BSA m2 O2 Sat FR L/min FiO2 HC       2021  "10:37 /72 Sitting    81 - R  97.8 136lbs 0oz 5'  7\" 21.3 1.71 97 %            Physical Examination  · Constitutional  o Appearance  o : well-nourished, in no acute distress  · Eyes  o Conjunctivae  o : conjunctivae normal  o Sclerae  o : sclerae white  o Pupils and Irises  o : pupils equal, round, and reactive to light and accommodation bilaterally  o Eyelids/Ocular Adnexae  o : eyelid appearance normal, no exudates present  · Ears, Nose, Mouth and Throat  o Ears  o :   § External Ears  § : external auditory canal appearance within normal limits, no discharge present  § Otoscopic Examination  § : tympanic membrane appearance within normal limits bilaterally  o Nose  o :   § External Nose  § : appearance normal  § Nasopharynx  § : no discharge present  o Oral Cavity  o :   § Oral Mucosa  § : oral mucosa normal  § Lips  § : lip appearance normal  o Throat  o :   § Oropharynx  § : no inflammation or lesions present, tonsils within normal limits  · Neck  o Inspection/Palpation  o : normal appearance, no masses or tenderness, trachea midline  o Range of Motion  o : cervical range of motion within normal limits  o Thyroid  o : gland size normal, nontender, no nodules or masses present on palpation  o Jugular Veins  o : JVP normal  · Respiratory  o Respiratory Effort  o : breathing unlabored  o Inspection of Chest  o : normal appearance  o Auscultation of Lungs  o : normal breath sounds throughout  · Cardiovascular  o Heart  o :   § Auscultation of Heart  § : regular rate and rhythm, no murmurs, gallops or rubs  o Peripheral Vascular System  o :   § Extremities  § : no edema  · Gastrointestinal  o Abdominal Examination  o : abdomen nontender to palpation, tone normal without rigidity or guarding, no masses present, bowel sounds present in all four quadrants  o Liver and spleen  o : no hepatomegaly present, liver nontender to palpation, spleen not palpable  o Hernias  o : no hernias present  · Lymphatic  o Neck  o : " no lymphadenopathy present  · Musculoskeletal  o Spine  o :   § Inspection/Palpation  § : no spinal tenderness, scoliosis or kyphosis present  § Stability  § : no subluxations present  § Range of Motion  § : spine range of motion normal  o Right Upper Extremity  o :   § Inspection/Palpation  § : no tenderness to palpation, ROM normal  o Left Upper Extremity  o :   § Inspection/Palpation  § : no tenderness to palpation, ROM normal  o Right Lower Extremity  o :   § Inspection/Palpation  § : no joint or limb tenderness to palpation, ROM normal  o Left Lower Extremity  o :   § Inspection/Palpation  § : no joint or limb tenderness to palpation, ROM normal  · Skin and Subcutaneous Tissue  o General Inspection  o : no rashes or lesions present, no areas of discoloration  o Body Hair  o : scalp palpation normal, hair normal for age, general body hair distribution normal for age  o Digits and Nails  o : no clubbing, cyanosis, deformities or edema present, normal appearing nails  · Neurologic  o Mental Status Examination  o :   § Orientation  § : grossly oriented to person, place and time  o Gait and Station  o : normal gait, able to stand without difficulty  · Psychiatric  o Judgement and Insight  o : judgment and insight intact  o Mood and Affect  o : depressed, flattened           Assessment  · Insomnia, unspecified     780.52/G47.00  · Tobacco abuse counseling       Tobacco abuse counseling     V65.42/Z71.6  · Grieving     309.0/F43.21  · Nausea & vomiting     787.01/R11.2  · Bipolar disorder     296.80/F31.9      Plan  · Orders  o Smoking cessation counseling, 3-10 minutes [facility] ([address]) - V65.42/Z71.6 - 03/02/2021  o ACO-17: Screened for tobacco use AND received tobacco cessation intervention (4004F) - V65.42/Z71.6 - 03/02/2021  o ACO-39: Current medications updated and reviewed (1159F, ) - - 03/02/2021  · Medications  o scopolamine base 1 mg over 3 days transdermal patch 3 day   SIG: apply 1 patch by transdermal  route to the hairless area behind 1 ear reapply every 3 days as needed   DISP: (10) Patch with 2 refills  Prescribed on 03/02/2021     o olanzapine 10 mg oral tablet   SIG: take 1 tablet (10 mg) by oral route once daily in the evening for 30 days   DISP: (30) Tablet with 2 refills  Prescribed on 03/02/2021     o Medications have been Reconciled  o Transition of Care or Provider Policy  · Instructions  o Tobacco and smoking cessation counseling for more than 3 minutes was completed.  o Patient was educated/instructed on their diagnosis, treatment and medications prior to discharge from the clinic today.  o Minutes spent with patient including greater than 50% in Education/Counseling/Care Coordination.  o Time spent with the patient was minutes, more than 50% face to face. 35 minutes  · Disposition  o Return Visit Request in/on 2 weeks +/- 0 days ([id number]).            Electronically Signed by: Sofie Ku MD -Author on March 2, 2021 01:29:24 PM

## 2021-05-14 NOTE — PROGRESS NOTES
"   Progress Note      Patient Name: Elly Barrett   Patient ID: [id number]   Sex: Female   YOB: 1980    Primary Care Provider: Sofie Ku MD   Referring Provider: Sofie Ku MD    Visit Date: April 14, 2021    Provider: Stanley Quinones MD   Location: [facility]   Location Address: [address] [place] [address]  [id number]   Location Phone: [phone]          Chief Complaint     \"I am doing really well.\"           History Of Present Illness  Elly Barrett is a 40 year old /White female who presents to the office today referred by Sofie uK MD.   Chart review 12/15: Seen November 19th. Patient has a history of bipolar disorder. Lost her insurance, so they are trying to find medications that are covered on the $4 medication list. Discontinued Depakote because it made her \"shaky.\" She is on lamotrigine 400 mg p.o. daily, quetiapine 200 nightly, Restoril 30 mg nightly. No longer on diazepam 5 mg 3 times daily. June labs: LFTs within normal limits electrolytes within normal limits creatinine is 0.98. EKG 2018 shows heart rate 72, , sinus. MRI 2017 without contrast for headache shows no acute, with a few small foci of nonspecific gliosis which could be related to migraine disorder or early minimal chronic small vessel ischemic change.   Virtual visit via Zoom audio and video due to the COVID-19 pandemic. Patient is accepting of and agreeable to appointment. The appointment consisted of the patient and I only. Interview: Patient reports that she is doing well now that she is home. Her mom is with her. Her daughter has left the house but patient believes she will be coming back. Her mother-in-law helped clean the house. Patient reports her mood is significantly improved. She is eating well. She is avoiding social media and Facebook because she feels it was detrimental to her mental health. Patient is future oriented: She is ready to start " working. She meets with her [facility] therapist tomorrow, and a jobs specialist as well. At one point the patient cracked a joke and smiled.   Denies MALIK ROTH. States that she knows that if she has suicidal thoughts that she can immediately go back to the adult crisis unit at [facility].   ...   ...       Past Medical History  Disease Name Date Onset Notes   Abdominal pain --  --    Allergies --  --    Allergy, Unspecified --  allergies   Anxiety --  --    Asthma --  --    Attention Deficit Hyperactivity Disorder --  --    Bipolar Disorder --  psychiatric care   Chronic idiopathic constipation --  --    Constipation --  --    Depression --  --    Depression (emotion) --  --    Epigastric pain --  --    Frequent headaches --  --    Hallucinations --  --    Hemorrhoids, Unspecified, without Complications --  --    Insomnia --  --    Microscopic hematuria 09/10/2018 --    Migraines --  --    Nausea and vomiting --  --    Reflux --  --    Seasonal allergies --  --    Suicidal thoughts --  --    Tobacco abuse --  --          Past Surgical History  Procedure Name Date Notes   Breast augmentation --  --    Cesarian Section 2003 --    EGD 2018 [facility]         Medication List  Name Date Started Instructions   lamotrigine 200 mg oral tablet 11/19/2020 take 2 tablets (400 mg) by oral route once daily for 30 days   olanzapine 10 mg oral tablet 03/02/2021 take 1 tablet (10 mg) by oral route once daily in the evening for 30 days   ondansetron 8 mg oral tablet,disintegrating 03/26/2021 dissolve 1 tablet by oral route 3 times a day as needed for 10 days   Restoril 15 mg oral capsule 02/12/2021 take 1 capsule (15 mg) by oral route once daily at bedtime as needed for 30 days         Allergy List  Allergen Name Date Reaction Notes   aspirin --  --  --    Egg --  --  --    Keflex --  --  Blisters on face   NSAIDS --  --  --    PENICILLINS --  --  --        Allergies Reconciled  Family Medical History  Disease Name Relative/Age Notes    Heart Disease Father/   Father  Mother   CVA (Cerebrovascular accident) Father/   --    Diabetes, unspecified type Mother/   Mother   Osteoporosis Father/  Mother/   Mother; Father   No family history of colorectal cancer  --    Family history of Arthritis Father/   Father         Reproductive History  Menstrual   Last Menstrual Period: 2015   Pregnancy Summary   Total Pregnancies: 0 Full Term: 0 Premature: 0   Ab Induced: 0 Ab Spontaneous: 0 Ectopics: 0   Multiples: 0 Livin         Social History  Finding Status Start/Stop Quantity Notes   Alcohol Never --/-- --  2021 - 2021 - 12/15/2020 -    Alcohol Use Current - status unknown --/-- --  less than 1 drink per day   Claustophobic Unknown --/-- --  yes   Homemaker. --  --/-- --  --    lives with children --  --/-- --  --    lives with spouse --  --/-- --  --     --  --/-- --  --    . --  --/-- --  --    Recreational Drug Use Never --/-- --  2021 - 2021 - 12/15/2020 - no   Tobacco Current every day --/-- 1.5 PPD current every day smoker, 1.5 packs per day, smoked 11-20 years  current every day smoker, 1 packs per day, smoked 11-20 years   Unemployed. --  --/-- --  --          Immunizations  NameDate Admin Mfg Trade Name Lot Number Route Inj VIS Given VIS Publication   InfluenzaHad Disease  NE Not Entered  NE NE     Comments: declined         Review of Systems  · Constitutional  o Admits  o : fatigue  o Denies  o : night sweats  · Eyes  o Denies  o : double vision, blurred vision  · HENT  o Denies  o : vertigo, recent head injury  · Breasts  o Denies  o : abnormal changes in breast size, additional breast symptoms except as noted in the HPI  · Cardiovascular  o Denies  o : chest pain, irregular heart beats  · Respiratory  o Denies  o : shortness of breath, productive cough  · Gastrointestinal  o Denies  o : nausea, vomiting  · Genitourinary  o Denies  o : dysuria, urinary retention  · Integument  o Denies  o : hair  "growth change, new skin lesions  · Neurologic  o Denies  o : altered mental status, seizures  · Musculoskeletal  o Denies  o : joint swelling, limitation of motion  · Endocrine  o Denies  o : cold intolerance, heat intolerance  · Heme-Lymph  o Denies  o : petechiae, lymph node enlargement or tenderness  · Allergic-Immunologic  o Denies  o : frequent illnesses      Physical Examination  · Mental Status Exam  o Appearance  o : good eye contact, normal street clothes, groomed, walking around her house and briefly outside to her car  o Behavior  o : pleasant and cooperative  o Motor  o : Walking from her house to her car and back  o Speech  o : Slightly difficult to interrupt. Normal rhythm, rate, volume, tone, not hyperverbal, not pressured, normal prosidy  o Mood  o : \"Doing really well\"  o Affect  o : Slight constriction, but almost euthymic, no tears, able to smile  o Thought Content  o : negative suicidal ideations, negative homicidal ideations, negative obsessions  o Perceptions  o : negative auditory hallucinations, negative visual hallucinations  o Thought Process  o : linear  o Insight/Judgement  o : fair/fair  o Cognition  o : grossly intact  o Attention  o : intact          Assessment  · Anxiety     300.02/F41.1  · Depression     296.31  · Tobacco abuse     305.1/Z72.0       Presentation most consistent with bipolar 1, current episode manic, without psychotic features, as well as complicated bereavement. Consider cluster B, histrionic, narcissistic, borderline.  Presentation is very unusual.    [facility] is [facility] at the adult crisis stabilization unit.  Now stable on venlafaxine and Abilify.    Patient is doing well, and denies SI.  She also is future oriented and has a plan if she does develop SI.  No change to medications at this time.  Medications updated in the record.    I am slightly concerned that the patient is hypomanic given how she was walking around to the " whole time and slightly difficult interrupt.  She undressed her pants in front of me while speaking to me on the phone, although she did have a long shirt on and nothing was visible.    Follow-up in 2 wks.    Continue psychotherapy with [facility].       Plan  · Orders  o ACO-39: Current medications updated and reviewed (1159F, ) - - 04/14/2021  · Medications  o venlafaxine 75 mg oral capsule,extended release 24hr   SIG: take 1 capsule (75 mg) by oral route once daily with food for 30 days   DISP: (30) Capsule with 0 refills  Prescribed on 04/14/2021     o aripiprazole 5 mg oral tablet   SIG: take 1 tablet (5 mg) by oral route once daily for 30 days   DISP: (30) Tablet with 0 refills  Prescribed on 04/14/2021     o olanzapine 10 mg oral tablet   SIG: take 1 tablet (10 mg) by oral route once daily in the evening for 30 days   DISP: (30) Tablet with 2 refills  Discontinued on 04/14/2021     o Restoril 15 mg oral capsule   SIG: take 1 capsule (15 mg) by oral route once daily at bedtime as needed for 30 days   DISP: (30) Capsule with 2 refills  Discontinued on 04/14/2021     o Medications have been Reconciled  o Transition of Care or Provider Policy  · Instructions  o Patient understands to call 911 or go to the emergency room if for any reason she has thoughts of hurting herself or other people. Reiterated with her again today.  o Risk Assessment: Risk of self-harm acutely is high, not imminent. Risk factors include severe recent psychosocial stressor, mood disorder, access to weapons, history of self-harm and suicide attempt, recent self-harm, recent admission to inpatient for SI. Protective factors include no family history, no present SI, minimal AODA, healthcare seeking, future orientation, willingness to engage in care, support from her daughter. Risk of self-harm chronically is also high, but could be further elevated in the event of treatment noncompliance and/or AODA.  o Chronic Risk:   o Safety: No acute  safety concerns.  o Medications: CONTINUE melatonin 3 mg PO QHS. CONTINUE lamotrigine 400 mg p.o. daily. CONTINUE aripiprazole 5 mg PO QDAY. CONTINUE venlafaxine 75 mg PO QDAY. *** S/P trazodone for insomnia (no effect), mirtazapine (no effect), quetiapine (didn't take), eszopiclone (no effect), olanzapine, temazepam.  o Therapy: Declines.  o Follow Up: 2 week.  · Disposition  o Follow Up in 2 weeks.            Electronically Signed by: Stanley Quinones MD -Author on April 14, 2021 02:31:26 PM

## 2021-05-14 NOTE — PROGRESS NOTES
"   Progress Note      Patient Name: Elly Barrett   Patient ID: [id number]   Sex: Female   YOB: 1980    Primary Care Provider: Sofie Ku MD   Referring Provider: Sofie Ku MD    Visit Date: April 28, 2021    Provider: Stanley Quinones MD   Location: [facility]   Location Address: [address] [place] [address]  [id number]   Location Phone: [phone]          Chief Complaint     \"The medications are helping.  I am doing really well.\"           History Of Present Illness  Elly Barrett is a 40 year old /White female who presents to the office today referred by Sofie Ku MD.   Chart review 12/15: Seen November 19th. Patient has a history of bipolar disorder. Lost her insurance, so they are trying to find medications that are covered on the $4 medication list. Discontinued Depakote because it made her \"shaky.\" She is on lamotrigine 400 mg p.o. daily, quetiapine 200 nightly, Restoril 30 mg nightly. No longer on diazepam 5 mg 3 times daily. June labs: LFTs within normal limits electrolytes within normal limits creatinine is 0.98. EKG 2018 shows heart rate 72, , sinus. MRI 2017 without contrast for headache shows no acute, with a few small foci of nonspecific gliosis which could be related to migraine disorder or early minimal chronic small vessel ischemic change.   Virtual visit via Zoom audio and video due to the COVID-19 pandemic. Patient is accepting of and agreeable to appointment. The appointment consisted of the patient and I only. Interview: Patient continues to do well. Looking to volunteer at [employer]. She says that is what her  would want. She was sitting outside on the porch with her mom, who also inform me that the patient is doing well. Mom feels that patient is lonely, which is why she is visiting the patient for a few days. No SI HI AVH. Patient does note that she has panic attacks, a few times a day for 5 to 10 " minutes. These are characterized by shortness of air, palpitations, tremor, nausea. Sleeping well, at least 6 hours a night.   Again, states that she knows that if she has suicidal thoughts that she can immediately go back to the adult crisis unit at [facility].   ...   ...       Past Medical History  Disease Name Date Onset Notes   Abdominal pain --  --    Allergies --  --    Allergy, Unspecified --  allergies   Anxiety --  --    Asthma --  --    Attention Deficit Hyperactivity Disorder --  --    Bipolar Disorder --  psychiatric care   Chronic idiopathic constipation --  --    Constipation --  --    Depression --  --    Depression (emotion) --  --    Epigastric pain --  --    Frequent headaches --  --    Hallucinations --  --    Hemorrhoids, Unspecified, without Complications --  --    Insomnia --  --    Major depressive disorder, recurrent, moderate 04/22/2021 --    Microscopic hematuria 09/10/2018 --    Migraines --  --    Nausea and vomiting --  --    Reflux --  --    Seasonal allergies --  --    Suicidal thoughts --  --    Tobacco abuse --  --          Past Surgical History  Procedure Name Date Notes   Breast augmentation --  --    Cesarian Section 2003 --    EGD 2018 [facility]         Medication List  Name Date Started Instructions   aripiprazole 5 mg oral tablet 04/14/2021 take 1 tablet (5 mg) by oral route once daily for 30 days   lamotrigine 200 mg oral tablet 11/19/2020 take 2 tablets (400 mg) by oral route once daily for 30 days   ondansetron 8 mg oral tablet,disintegrating 03/26/2021 dissolve 1 tablet by oral route 3 times a day as needed for 10 days   Restoril 30 mg oral capsule  take 1 capsule (30 mg) by oral route once daily at bedtime as needed   venlafaxine 75 mg oral capsule,extended release 24hr 04/14/2021 take 1 capsule (75 mg) by oral route once daily with food for 30 days   Zyprexa 10 mg oral tablet  --          Allergy List  Allergen Name Date Reaction Notes   aspirin --  --  --    Codiene --   --  --    Egg --  --  --    Keflex --  --  Blisters on face   NSAIDS --  --  --    PENICILLINS --  --  --          Family Medical History  Disease Name Relative/Age Notes   Heart Disease Father/   Father  Mother   CVA (Cerebrovascular accident) Father/   --    Diabetes, unspecified type Mother/   Mother   Osteoporosis Father/  Mother/   Mother; Father   No family history of colorectal cancer  --    Family history of Arthritis Father/   Father         Reproductive History  Menstrual   Last Menstrual Period: 2015   Pregnancy Summary   Total Pregnancies: 0 Full Term: 0 Premature: 0   Ab Induced: 0 Ab Spontaneous: 0 Ectopics: 0   Multiples: 0 Livin         Social History  Finding Status Start/Stop Quantity Notes   Alcohol Never --/-- --  2021 - 2021 - 12/15/2020 -    Alcohol Use Current - status unknown --/-- --  less than 1 drink per day   Claustophobic Unknown --/-- --  yes   Homemaker. --  --/-- --  --    lives with children --  --/-- --  --    lives with spouse --  --/-- --  --     --  --/-- --  --    . --  --/-- --  --    Recreational Drug Use Never --/-- --  2021 - 2021 - 12/15/2020 - no   Tobacco Current every day --/-- 1.5 PPD current every day smoker, 1.5 packs per day, smoked 11-20 years  current every day smoker, 1 packs per day, smoked 11-20 years   Unemployed. --  --/-- --  --          Immunizations  NameDate Admin Mfg Trade Name Lot Number Route Inj VIS Given VIS Publication   InfluenzaHad Disease  NE Not Entered  NE NE     Comments: declined         Review of Systems  · Constitutional  o Denies  o : fatigue, night sweats  · Eyes  o Denies  o : double vision, blurred vision  · HENT  o Denies  o : vertigo, recent head injury  · Breasts  o Denies  o : abnormal changes in breast size, additional breast symptoms except as noted in the HPI  · Cardiovascular  o Denies  o : chest pain, irregular heart beats  · Respiratory  o Admits  o : shortness of  "breath  o Denies  o : productive cough  · Gastrointestinal  o Denies  o : nausea, vomiting  · Genitourinary  o Denies  o : dysuria, urinary retention  · Integument  o Denies  o : hair growth change, new skin lesions  · Neurologic  o Denies  o : altered mental status, seizures  · Musculoskeletal  o Denies  o : joint swelling, limitation of motion  · Endocrine  o Denies  o : cold intolerance, heat intolerance  · Psychiatric  o Admits  o : additional psychiatric symptoms except as noted in the HPI  · Heme-Lymph  o Denies  o : petechiae, lymph node enlargement or tenderness  · Allergic-Immunologic  o Denies  o : frequent illnesses      Physical Examination  · Mental Status Exam  o Appearance  o : good eye contact, normal street clothes, groomed, sitting on her porch with her mom  o Behavior  o : pleasant and cooperative  o Motor  o : No abnormal  o Speech  o : Slightly difficult to interrupt. Normal rhythm, rate, volume, tone, not hyperverbal, not pressured, normal prosidy  o Mood  o : \"Still doing well\"  o Affect  o : Slight constriction, but almost euthymic, no tears, able to smile  o Thought Content  o : negative suicidal ideations, negative homicidal ideations, negative obsessions  o Perceptions  o : negative auditory hallucinations, negative visual hallucinations  o Thought Process  o : linear  o Insight/Judgement  o : fair/fair  o Cognition  o : grossly intact  o Attention  o : intact          Assessment  · Anxiety     300.02/F41.1  · Depression     296.31  · Tobacco abuse     305.1/Z72.0       Presentation most consistent with history of depression with psychotic features, though psychotic features have now resolved.  Current episode is depressed.  Generalized anxiety disorder, as complicated bereavement. Consider cluster B, histrionic, narcissistic, borderline.    Patient is status post [facility] at the adult crisis stabilization unit.  Now stable on venlafaxine and Abilify.  Continues " to do well.    Denies SI.  Future oriented and that she is applying for Social Security disability, VA benefits, and is also planning on volunteering at [facility] [employer].    Increase venlafaxine to target anxiety and start gabapentin, also to target anxiety.  Continue Abilify.  Refilled.  Urine drug screen sent to patient via email.  Rick ordered.  Controlled substances agreement signed verbally today.    Follow-up in 2 wks.    Continue psychotherapy with [facility].       Plan  · Orders  o Urine Drug Screen ([facility]) (53379) - 300.02/F41.1, 296.31, 305.1/Z72.0 - 04/28/2021  o ACO-39: Current medications updated and reviewed (1159F, ) - - 04/28/2021  · Medications  o venlafaxine 37.5 mg oral capsule,extended release 24hr   SIG: take 1 capsule (37.5 mg) by oral route once daily with food for 60 days   DISP: (60) Capsule with 1 refills  Prescribed on 04/28/2021     o gabapentin 300 mg oral capsule   SIG: take 1 capsule by oral route 2 times a day for 30 days   DISP: (60) Capsule with 3 refills  Prescribed on 04/28/2021     o aripiprazole 5 mg oral tablet   SIG: take 1 tablet (5 mg) by oral route once daily for 60 days   DISP: (60) Tablet with 1 refills  Refilled on 04/28/2021     o Medications have been Reconciled  o Transition of Care or Provider Policy  · Instructions  o Risk Assessment: Risk of self-harm acutely is high, not imminent. Risk factors include severe recent psychosocial stressor, mood disorder, access to weapons, history of self-harm and suicide attempt, recent self-harm, recent admission to inpatient for SI. Protective factors include no family history, no present SI, minimal AODA, healthcare seeking, future orientation, willingness to engage in care, support from her daughter. Risk of self-harm chronically is also high, but could be further elevated in the event of treatment noncompliance and/or AODA.  o Chronic Risk:   o Safety: No acute safety concerns.  o Medications: CONTINUE melatonin 3 mg PO QHS.  CONTINUE lamotrigine 400 mg p.o. daily. CONTINUE aripiprazole 5 mg PO QDAY. INCREASE venlafaxine 75 to 112.5 mg PO QDAY. START gabapentin 300 mg p.o. 2 times daily to target anxiety. Risks, benefits, alternatives discussed with patient including sedation, dizziness, GI upset, weight gain. After discussion of these risks and benefits, the patient voiced understanding and agreed to proceed. MICHELES, Rikc ordered. Verbally signed controlled substances agreement.*** S/P trazodone for insomnia (no effect), mirtazapine (no effect), quetiapine (didn't take), eszopiclone (no effect), olanzapine, temazepam.  o Therapy: Continue with [facility].  o Follow Up: 2 week.  · Disposition  o Follow Up in 2 weeks.            Electronically Signed by: Stanley Quinones MD -Author on April 28, 2021 08:43:03 AM

## 2021-05-14 NOTE — PROGRESS NOTES
"   Progress Note      Patient Name: Elly Barrett   Patient ID: [id number]   Sex: Female   YOB: 1980    Primary Care Provider: Sofie Ku MD   Referring Provider: Sofie Ku MD    Visit Date: January 6, 2021    Provider: Stanley Quinones MD   Location: [facility]   Location Address: [address] [place] [address]  [address] [place] [address]  [id number]   Location Phone: [phone]          Chief Complaint     \"Well, I have been getting a little sleep.\"           History Of Present Illness  Elly Barrett is a 40 year old /White female who presents to the office today referred by Sofie Ku MD.   Chart review 12/15: Seen November 19th. Patient has a history of bipolar disorder. Lost her insurance, so they are trying to find medications that are covered on the $4 medication list. Discontinued Depakote because it made her \"shaky.\" She is on lamotrigine 400 mg p.o. daily, quetiapine 200 nightly, Restoril 30 mg nightly. No longer on diazepam 5 mg 3 times daily. June labs: LFTs within normal limits electrolytes within normal limits creatinine is 0.98. EKG 2018 shows heart rate 72, , sinus. MRI 2017 without contrast for headache shows no acute, with a few small foci of nonspecific gliosis which could be related to migraine disorder or early minimal chronic small vessel ischemic change.   Virtual visit via Zoom audio and video due to the COVID-19 pandemic. Patient is accepting of and agreeable to appointment. The appointment consisted of the patient and I only. Interview: Patient is in the car again. She is with her friend Catherine. States she slept for about 4 5 hours the last few nights. Still has out of body experiences. Has run out of Restoril. Otherwise medication compliant. States she has had SI, but she had a discussion with her daughter, who agreed to stay in [place] and go to [facility] see rather than [place]. For this reason, patient reports she has no plan or intent " to harm herself. Patient does report that she hears mumblings in the evenings. No present SI HI AVRENEE. Has stopped cutting herself after her daughter found out.   ...   ...   ...       Past Medical History  Disease Name Date Onset Notes   Abdominal Pain --  --    Allergies --  --    Allergy, Unspecified --  allergies   Anxiety --  --    Asthma --  --    Attention Deficit Hyperactivity Disorder --  --    Bipolar Disorder --  psychiatric care   Chronic idiopathic constipation --  --    Constipation --  --    Depression --  --    Epigastric pain --  --    Frequent headaches --  --    Hallucinations --  --    Hemorrhoids, Unspecified, without Complications --  --    Microscopic hematuria 09/10/2018 --    Migraines --  --    Nausea and vomiting --  --    Reflux --  --    Seasonal allergies --  --    Suicidal thoughts --  --    Tobacco abuse --  --          Past Surgical History  Procedure Name Date Notes   Breast augmentation --  --    Cesarian Section 2003 --    EGD 2018 [facility]         Medication List  Name Date Started Instructions   albuterol sulfate 2.5 mg /3 mL (0.083 %) inhalation solution for nebulization 06/22/2020 inhale 3 milliliters (2.5 mg) by nebulization route 3 times per day as needed for 30 days   hydroxyzine HCl 25 mg oral tablet 12/16/2020 TAKE 1 TABLET BY MOUTH THREE TIMES DAILY AS NEEDED FOR 30 DAYS   lamotrigine 200 mg oral tablet 11/19/2020 take 2 tablets (400 mg) by oral route once daily for 30 days   montelukast 10 mg oral tablet 03/16/2020 TAKE 1 TABLET BY MOUTH ONCE DAILY IN THE EVENING FOR 90 DAYS   promethazine 25 mg oral tablet 09/18/2020 Take 1 tablet by mouth once daily for 30 days   quetiapine 200 mg oral tablet 01/06/2021 take 4 tablets by oral route once a day (at bedtime) for 30 days   Restoril 30 mg oral capsule 01/05/2021 take 1 capsule (30 mg) by oral route once daily at bedtime as needed for 30 days   Spiriva Respimat 1.25 mcg/actuation inhalation mist 11/19/2020 inhale 2 puffs (2.5  mcg) by inhalation route once daily for 30 days   trazodone 100 mg oral tablet 2021 take 2 tablets by oral route once a day (at bedtime) for 60 days         Allergy List  Allergen Name Date Reaction Notes   aspirin --  --  --    Egg --  --  --    Keflex --  --  Blisters on face   NSAIDS --  --  --    PENICILLINS --  --  --        Allergies Reconciled  Family Medical History  Disease Name Relative/Age Notes   Heart Disease Father/   Father  Mother   CVA (Cerebrovascular accident) Father/   --    Diabetes, unspecified type Mother/   Mother   Osteoporosis Father/  Mother/   Mother; Father   No family history of colorectal cancer  --    Family history of Arthritis Father/   Father         Reproductive History  Menstrual   Last Menstrual Period: 2015   Pregnancy Summary   Total Pregnancies: 0 Full Term: 0 Premature: 0   Ab Induced: 0 Ab Spontaneous: 0 Ectopics: 0   Multiples: 0 Livin         Social History  Finding Status Start/Stop Quantity Notes   Alcohol Never --/-- --  12/15/2020 -    Alcohol Use Current - status unknown --/-- --  less than 1 drink per day   Claustophobic Unknown --/-- --  yes   Homemaker. --  --/-- --  --    lives with children --  --/-- --  --    lives with spouse --  --/-- --  --     --  --/-- --  --    . --  --/-- --  --    Recreational Drug Use Never --/-- --  12/15/2020 - no   Tobacco Current every day --/-- 1.5 PPD current every day smoker, 1.5 packs per day, smoked 11-20 years  current every day smoker, 1 packs per day, smoked 11-20 years   Unemployed. --  --/-- --  --          Immunizations  NameDate Admin Mfg Trade Name Lot Number Route Inj VIS Given VIS Publication   InfluenzaHad Disease  NE Not Entered  NE NE     Comments: declined         Review of Systems  · Constitutional  o Admits  o : fatigue  o Denies  o : night sweats  · Eyes  o Admits  o : double vision, blurred vision  · HENT  o Denies  o : vertigo, recent head injury  · Breasts  o Denies  o :  "abnormal changes in breast size, additional breast symptoms except as noted in the HPI  · Cardiovascular  o Admits  o : irregular heart beats  o Denies  o : chest pain  · Respiratory  o Denies  o : shortness of breath, productive cough  · Gastrointestinal  o Denies  o : nausea, vomiting  · Genitourinary  o Denies  o : dysuria, urinary retention  · Integument  o Denies  o : hair growth change, new skin lesions  · Neurologic  o Admits  o : altered mental status  o Denies  o : seizures  · Musculoskeletal  o Denies  o : joint swelling, limitation of motion  · Endocrine  o Admits  o : cold intolerance  o Denies  o : heat intolerance  · Heme-Lymph  o Denies  o : petechiae, lymph node enlargement or tenderness  · Allergic-Immunologic  o Denies  o : frequent illnesses      Physical Examination  · Mental Status Exam  o Appearance  o : good eye contact, normal street clothes, groomed, sitting in the  seat of her car  o Behavior  o : pleasant and cooperative  o Motor  o : No abnormal  o Speech  o : normal rhythm, rate, volume, tone, not hyperverbal, not pressured, normal prosidy  o Mood  o : \"OK\"  o Affect  o : Constricted  o Thought Content  o : negative suicidal ideations, negative homicidal ideations, negative obsessions  o Perceptions  o : negative auditory hallucinations, negative visual hallucinations  o Thought Process  o : linear  o Insight/Judgement  o : fair/fair  o Cognition  o : grossly intact  o Attention  o : intact          Assessment  · Tobacco abuse counseling       Tobacco abuse counseling     V65.42/Z71.6  · Anxiety     300.02/F41.1  · Depression     296.31  · Tobacco abuse     305.1/Z72.0       Presentation most consistent with likely major depressive disorder, most recent episode severe with psychotic features, as well as complicated bereavement. Rule out bipolar disorder, personality disorder.    Stable. Better sleep. Still having what sounds like possible hallucinations. Continue to target sleep " and possible AVH.    INCREASE dose of quetiapine to 800 mg PO QHS. INCREASE trazodone to 200 mg PO QHS. Continue melatonin 3 mg p.o. nightly, Restoril 30 mg PO QHS, lamictal 400 mg PO QDAY.  Continue other medications as scheduled.    Patient reported she was out of temazepam. I ordered a Yasmany, which confirms only temazepam and diazepam prescriptions. However, patient still has refills left on her older prescription, and I informed her of this.    Follow-up in 2 wks.    Patient declines psychotherapy.       Plan  · Orders  o YASMANY Report (KASPR) - 300.02/F41.1, 296.31, 305.1/Z72.0, V65.42/Z71.6 - 01/06/2021  o ACO-39: Current medications updated and reviewed (1159F, ) - - 01/06/2021  · Medications  o quetiapine 400 mg oral tablet   SIG: take 2 tablets by oral route once a day (at bedtime) for 30 days   DISP: (60) Tablet with 1 refills  Adjusted on 01/07/2021     o trazodone 100 mg oral tablet   SIG: take 2 tablets by oral route once a day (at bedtime) for 60 days   DISP: (120) Tablet with 1 refills  Adjusted on 01/06/2021     o Medications have been Reconciled  o Transition of Care or Provider Policy  · Instructions  o Tobacco and smoking cessation counseling for more than 3 minutes was completed.  o Patient understands to call 911 or go to the emergency room if for any reason she has thoughts of hurting herself or other people. Reiterated with her again today.  o Risk Assessment: Risk of self-harm acutely is high, not imminent. Risk factors include severe recent psychosocial stressor, mood disorder, access to weapons, history of self-harm and suicide attempt, recent self-harm. Protective factors include no family history, no present SI, minimal AODA, healthcare seeking, future orientation, willingness to engage in care, support from her daughter. Risk of self-harm chronically is also high, but could be further elevated in the event of treatment noncompliance and/or AODA.  o Safety: No acute safety  concerns.  o Medications: CONTINUE melatonin 3 mg PO QHS. INCREASE trazodone from 100 mg to 200 mg PO QHS. CONTINUE Restoril 30 mg p.o. daily, lamotrigine 400 mg p.o. daily. INCREASE quetiapine from 600 mg to 800 mg PO QHS to target depression, poor appetite, hallucinations. Risks, benefits, alternatives discussed with patient including nausea and vomiting, GI upset, sedation, akathisia, hypotension, increased appetite, lowering of seizure threshold, theoretical risk of tardive dyskinesia. After discussion of these risks and benefits, the patient voiced understanding and agreed to proceed.  o Therapy: Declines.  o Follow Up: 2 week.  · Disposition  o Follow Up in 2 weeks.            Electronically Signed by: Stanley Quinones MD -Author on January 7, 2021 12:36:25 PM

## 2021-05-14 NOTE — PROGRESS NOTES
Progress Note      Patient Name: Elly Barrett   Patient ID: [id number]   Sex: Female   YOB: 1980    Primary Care Provider: Sofie Ku MD   Referring Provider: Sofie Ku MD    Visit Date: March 26, 2021    Provider: Sofie Ku MD   Location: [facility]   Location Address: [address] [place] [address], [address] [place] [address]  [id number]   Location Phone: [phone]          Chief Complaint  · Depression follow up      History Of Present Illness  Elly Barrett is a 40 year old /White female who presents for evaluation and treatment of:      Depressionpatient is very tearful in the office today and has lost 6 pounds since her last visit last week.  Patient reports that she has a lot of nausea and vomiting unable to tolerate p.o. and is only doing Gatorade to help.  Patient last ate last night.  Patient reports that she is very upset since she has lost all forms of income.  Patient's daughter recently turned 18 so she no longer gets her security benefits for her and she does not work due to her disabling condition.  Patient's  passed away 6 months ago so she has a very difficult time dealing with that loss.  Patient reports that she tried to contact Dr. Quinones's office to talk to him about her medications and her severe depression symptoms but he was not available at that time.  Patient is very tearful in the office today and reports that she thought about checking herself into [facility] but does not want to leave her daughter at home by herself.  Patient reports that her daughter is also getting ready to move to [place] with her boyfriend and she is afraid for herself living at home by herself without her daughter there.  Patient reports that if she wanted to kill her so she could have already since she has 2 things at home.  I advised her that if she feels the need to go to [facility] she needs to do that immediately.  I will also be  contacting Dr. Quinones to discuss this patient's current condition and to have a plan of action in place for when her daughter does leave so that she can have a support system to help her.  Patient reassures me that she does not want to kill the staff at this time because she would not want her daughter finding her.  Patient was given Zofran in the office today which she said did help significantly with her nausea symptoms I will be sending that to the pharmacy for her.  I will follow up with her in 2 weeks.    Total Time Spent with Patient was 35 minutes       Past Medical History  Abdominal pain; Allergies; Allergy, Unspecified; Anxiety; Asthma; Attention Deficit Hyperactivity Disorder; Bipolar Disorder; Chronic idiopathic constipation; Constipation; Depression; Depression (emotion); Epigastric pain; Frequent headaches; Hallucinations; Hemorrhoids, Unspecified, without Complications; Insomnia; Microscopic hematuria; Migraines; Nausea and vomiting; Reflux; Seasonal allergies; Suicidal thoughts; Tobacco abuse         Past Surgical History  Breast augmentation; Cesarian Section; EGD         Medication List  lamotrigine 200 mg oral tablet; olanzapine 10 mg oral tablet; promethazine 25 mg oral tablet; Restoril 15 mg oral capsule; scopolamine base 1 mg over 3 days transdermal patch 3 day         Allergy List  aspirin; Egg; Keflex; NSAIDS; PENICILLINS         Family Medical History  Heart Disease; CVA (Cerebrovascular accident); Diabetes, unspecified type; Osteoporosis; No family history of colorectal cancer; Family history of Arthritis         Reproductive History   0 Para 0 0 0 0       Social History  Alcohol (Never); Alcohol Use (Current - status unknown); Claustophobic (Unknown); Homemaker.; lives with children; lives with spouse; ; .; Recreational Drug Use (Never); Tobacco (Current every day); Unemployed.         Immunizations  Name Date Admin   Influenza Patient had disease   Influenza Patient  "had disease         Review of Systems  · Constitutional  o Admits  o : weight loss  o Denies  o : fatigue, fever, weight gain  · Eyes  o Denies  o : eye discomfort, eye pain  · HENT  o Denies  o : vertigo, nasal congestion  · Cardiovascular  o Denies  o : chest pain, irregular heart beats  · Respiratory  o Denies  o : shortness of breath, wheezing  · Gastrointestinal  o Admits  o : nausea, vomiting  · Genitourinary  o Denies  o : frequency, dysuria  · Integument  o Denies  o : rash, itching  · Neurologic  o Denies  o : muscular weakness, memory difficulties  · Musculoskeletal  o Denies  o : joint swelling, muscle pain  · Psychiatric  o Admits  o : anxiety, depression  · Heme-Lymph  o Denies  o : lightheadedness, easy bleeding  · Allergic-Immunologic  o Denies  o : sinus allergy symptoms, allergic dermatitis      Vitals  Date Time BP Position Site L\R Cuff Size HR RR TEMP (F) WT  HT  BMI kg/m2 BSA m2 O2 Sat FR L/min FiO2        03/26/2021 01:48 /74 Sitting    87 - R  98.1 133lbs 4oz 5'  7\" 20.87 1.69 99 %            Physical Examination  · Constitutional  o Appearance  o : well-nourished, in no acute distress  · Eyes  o Conjunctivae  o : conjunctivae normal  o Sclerae  o : sclerae white  o Pupils and Irises  o : pupils equal, round, and reactive to light and accommodation bilaterally  o Eyelids/Ocular Adnexae  o : eyelid appearance normal, no exudates present  · Ears, Nose, Mouth and Throat  o Ears  o :   § External Ears  § : external auditory canal appearance within normal limits, no discharge present  § Otoscopic Examination  § : tympanic membrane appearance within normal limits bilaterally  o Nose  o :   § External Nose  § : appearance normal  § Nasopharynx  § : no discharge present  o Oral Cavity  o :   § Oral Mucosa  § : oral mucosa normal  § Lips  § : lip appearance normal  o Throat  o :   § Oropharynx  § : no inflammation or lesions present, tonsils within normal limits  · Neck  o Range of Motion  o : " cervical range of motion within normal limits  · Respiratory  o Respiratory Effort  o : breathing unlabored  o Inspection of Chest  o : normal appearance  o Auscultation of Lungs  o : normal breath sounds throughout  · Cardiovascular  o Heart  o :   § Auscultation of Heart  § : regular rate and rhythm, no murmurs, gallops or rubs  o Peripheral Vascular System  o :   § Extremities  § : no edema  · Gastrointestinal  o Abdominal Examination  o : abdomen nontender to palpation, tone normal without rigidity or guarding, no masses present, bowel sounds present in all four quadrants  · Lymphatic  o Neck  o : no lymphadenopathy present  · Musculoskeletal  o Spine  o :   § Inspection/Palpation  § : no spinal tenderness, scoliosis or kyphosis present  § Stability  § : no subluxations present  § Range of Motion  § : spine range of motion normal  o Right Upper Extremity  o :   § Inspection/Palpation  § : no tenderness to palpation, ROM normal  o Left Upper Extremity  o :   § Inspection/Palpation  § : no tenderness to palpation, ROM normal  o Right Lower Extremity  o :   § Inspection/Palpation  § : no joint or limb tenderness to palpation, ROM normal  o Left Lower Extremity  o :   § Inspection/Palpation  § : no joint or limb tenderness to palpation, ROM normal  · Skin and Subcutaneous Tissue  o General Inspection  o : no rashes or lesions present, no areas of discoloration  o Body Hair  o : scalp palpation normal, hair normal for age, general body hair distribution normal for age  o Digits and Nails  o : no clubbing, cyanosis, deformities or edema present, normal appearing nails  · Neurologic  o Mental Status Examination  o :   § Orientation  § : grossly oriented to person, place and time  o Gait and Station  o : normal gait, able to stand without difficulty  · Psychiatric  o Judgement and Insight  o : judgment and insight intact  o Mood and Affect  o : mood normal, affect appropriate          Assessment  · Major depressive  disorder     296.20/F32.2  · Weight loss     783.21/R63.4  · Grieving     309.0/F43.21      Plan  · Orders  o ACO-39: Current medications updated and reviewed (1159F, ) - - 03/26/2021  · Medications  o ondansetron 8 mg oral tablet,disintegrating   SIG: dissolve 1 tablet by oral route 3 times a day as needed for 10 days   DISP: (30) Tablet with 3 refills  Prescribed on 03/26/2021     o promethazine 25 mg oral tablet   SIG: Take 1 tablet by mouth once daily for 30 days   DISP: (30) Tablet with -1 refills  Discontinued on 03/26/2021     o scopolamine base 1 mg over 3 days transdermal patch 3 day   SIG: apply 1 patch by transdermal route to the hairless area behind 1 ear reapply every 3 days as needed   DISP: (10) Patch with 2 refills  Discontinued on 03/26/2021     o Medications have been Reconciled  o Transition of Care or Provider Policy  · Instructions  o Patient was educated/instructed on their diagnosis, treatment and medications prior to discharge from the clinic today.  o Minutes spent with patient including greater than 50% in Education/Counseling/Care Coordination.  o Time spent with the patient was minutes, more than 50% face to face. 35 minutes  · Disposition  o Return Visit Request in/on 2 weeks +/- 0 days ([id number]).            Electronically Signed by: Sofie Ku MD -Author on March 26, 2021 03:09:25 PM

## 2021-05-14 NOTE — PROGRESS NOTES
Progress Note      Patient Name: Elly Barrett   Patient ID: [id number]   Sex: Female   YOB: 1980    Primary Care Provider: Sofie Ku MD   Referring Provider: Sofie Ku MD    Visit Date: April 19, 2021    Provider: Sofie Ku MD   Location: [facility]   Location Address: [address] [place] [address], [address] [place] [address]  [id number]   Location Phone: [phone]          Chief Complaint  · Psychiatric Care follow up      History Of Present Illness  Elly Barrett is a 40 year old /White female who presents for evaluation and treatment of:      Pt is doing much better and has gained 4 pounds since the last visit.   Pt reports she would like for me to tell her mother that she can leave but I am not going to talk to her mother about that since she has her own concerns about Elly and her mental health.  I informed Elly that the longer she goes feeling good and being mentally stable the more comfortable her mother will probably feel leaving her at home alone.  Elly is staying positive and reports she does have a cough and congestion and is wheezing on exam today.  I will be treating her with an antibiotic since she has been inpatient at a facility and her risk of infection is higher.    Constipation- pt reports she needs to have her Linzess refilled.  Pt reports now that she is eating better she is having some constipation.       Past Medical History  Abdominal pain; Allergies; Allergy, Unspecified; Anxiety; Asthma; Attention Deficit Hyperactivity Disorder; Bipolar Disorder; Chronic idiopathic constipation; Constipation; Depression; Depression (emotion); Epigastric pain; Frequent headaches; Hallucinations; Hemorrhoids, Unspecified, without Complications; Insomnia; Microscopic hematuria; Migraines; Nausea and vomiting; Reflux; Seasonal allergies; Suicidal thoughts; Tobacco abuse         Past Surgical History  Breast augmentation; Cesarian Section; EGD  "        Medication List  aripiprazole 5 mg oral tablet; lamotrigine 200 mg oral tablet; ondansetron 8 mg oral tablet,disintegrating; Restoril 30 mg oral capsule; venlafaxine 75 mg oral capsule,extended release 24hr; Zyprexa 10 mg oral tablet         Allergy List  aspirin; Egg; Keflex; NSAIDS; PENICILLINS         Family Medical History  Heart Disease; CVA (Cerebrovascular accident); Diabetes, unspecified type; Osteoporosis; No family history of colorectal cancer; Family history of Arthritis         Reproductive History   0 Para 0 0 0 0       Social History  Alcohol (Never); Alcohol Use (Current - status unknown); Claustophobic (Unknown); Homemaker.; lives with children; lives with spouse; ; .; Recreational Drug Use (Never); Tobacco (Current every day); Unemployed.         Immunizations  Name Date Admin   Influenza Patient had disease   Influenza Patient had disease         Review of Systems  · Constitutional  o Denies  o : fatigue, fever, weight loss, weight gain  · Eyes  o Denies  o : eye discomfort, eye pain  · HENT  o Denies  o : vertigo, nasal congestion  · Cardiovascular  o Denies  o : chest pain, irregular heart beats  · Respiratory  o Admits  o : wheezing, cough  o Denies  o : shortness of breath  · Gastrointestinal  o Denies  o : nausea, vomiting  · Genitourinary  o Denies  o : frequency, dysuria  · Integument  o Denies  o : rash, itching  · Neurologic  o Denies  o : muscular weakness, memory difficulties  · Musculoskeletal  o Denies  o : joint swelling, muscle pain  · Psychiatric  o Denies  o : anxiety, depression, suicidal ideation, homicidal ideation  · Heme-Lymph  o Denies  o : lightheadedness, easy bleeding  · Allergic-Immunologic  o Denies  o : sinus allergy symptoms, allergic dermatitis      Vitals  Date Time BP Position Site L\R Cuff Size HR RR TEMP (F) WT  HT  BMI kg/m2 BSA m2 O2 Sat FR L/min FiO2 HC       2021 04:17 /74 Sitting    75 - R  97.9 134lbs 2oz 5'  7\" 21.01 " 1.7 97 %            Physical Examination  · Constitutional  o Appearance  o : well-nourished, in no acute distress  · Eyes  o Conjunctivae  o : conjunctivae normal  o Sclerae  o : sclerae white  o Pupils and Irises  o : pupils equal, round, and reactive to light and accommodation bilaterally  o Eyelids/Ocular Adnexae  o : eyelid appearance normal, no exudates present  · Ears, Nose, Mouth and Throat  o Ears  o :   § External Ears  § : external auditory canal appearance within normal limits, no discharge present  § Otoscopic Examination  § : tympanic membrane appearance within normal limits bilaterally  o Nose  o :   § External Nose  § : appearance normal  § Nasopharynx  § : no discharge present  o Oral Cavity  o :   § Oral Mucosa  § : oral mucosa normal  § Lips  § : lip appearance normal  o Throat  o :   § Oropharynx  § : no inflammation or lesions present, tonsils within normal limits  · Neck  o Inspection/Palpation  o : normal appearance, no masses or tenderness, trachea midline  o Range of Motion  o : cervical range of motion within normal limits  o Thyroid  o : gland size normal, nontender, no nodules or masses present on palpation  o Jugular Veins  o : JVP normal  · Respiratory  o Respiratory Effort  o : breathing unlabored  o Inspection of Chest  o : normal appearance  o Auscultation of Lungs  o : wheezing present   · Cardiovascular  o Heart  o :   § Auscultation of Heart  § : regular rate and rhythm, no murmurs, gallops or rubs  o Peripheral Vascular System  o :   § Extremities  § : no edema  · Gastrointestinal  o Abdominal Examination  o : abdomen nontender to palpation, tone normal without rigidity or guarding, no masses present, bowel sounds present in all four quadrants  o Liver and spleen  o : no hepatomegaly present, liver nontender to palpation, spleen not palpable  o Hernias  o : no hernias present  · Lymphatic  o Neck  o : no lymphadenopathy present  · Musculoskeletal  o Spine  o :    § Inspection/Palpation  § : no spinal tenderness, scoliosis or kyphosis present  § Stability  § : no subluxations present  § Range of Motion  § : spine range of motion normal  o Right Upper Extremity  o :   § Inspection/Palpation  § : no tenderness to palpation, ROM normal  o Left Upper Extremity  o :   § Inspection/Palpation  § : no tenderness to palpation, ROM normal  o Right Lower Extremity  o :   § Inspection/Palpation  § : no joint or limb tenderness to palpation, ROM normal  o Left Lower Extremity  o :   § Inspection/Palpation  § : no joint or limb tenderness to palpation, ROM normal  · Skin and Subcutaneous Tissue  o General Inspection  o : no rashes or lesions present, no areas of discoloration  o Body Hair  o : scalp palpation normal, hair normal for age, general body hair distribution normal for age  o Digits and Nails  o : no clubbing, cyanosis, deformities or edema present, normal appearing nails  · Neurologic  o Mental Status Examination  o :   § Orientation  § : grossly oriented to person, place and time  o Gait and Station  o : normal gait, able to stand without difficulty  · Psychiatric  o Judgement and Insight  o : judgment and insight intact  o Mood and Affect  o : mood normal, affect appropriate              Assessment  · Bronchitis, acute     466.0/J20.9  · Constipation     564.00/K59.00  · Psychiatric illness     298.9/F99      Plan  · Orders  o ACO-17: Screened for tobacco use AND received tobacco cessation intervention (4004F) - - 04/19/2021  o ACO-39: Current medications updated and reviewed (1159F, ) - - 04/19/2021  o Gastroenterology Consultation (GASTR) - 564.00/K59.00 - 04/19/2021  · Medications  o azithromycin 250 mg oral tablet   SIG: take 2 tablets (500 mg) by oral route once daily for 1 day then 1 tablet (250 mg) by oral route once daily for 4 days   DISP: (6) Tablet with 0 refills  Prescribed on 04/19/2021     o Medications have been Reconciled  o Transition of Care or  Provider Policy  · Instructions  o Patient was educated/instructed on their diagnosis, treatment and medications prior to discharge from the clinic today.  o Patient given paper scripts today.  o Minutes spent with patient including greater than 50% in Education/Counseling/Care Coordination.  o Time spent with the patient was minutes, more than 50% face to face. 25 minutes  · Disposition  o Return Visit Request in/on 2 weeks +/- 0 days ([id number]).            Electronically Signed by: Sofie Ku MD -Author on April 27, 2021 09:08:00 AM

## 2021-05-14 NOTE — PROGRESS NOTES
"   Progress Note      Patient Name: Elly Barrett   Patient ID: [id number]   Sex: Female   YOB: 1980    Primary Care Provider: Sofie Ku MD   Referring Provider: Sofie Ku MD    Visit Date: April 6, 2021    Provider: Sofie Ku MD   Location: [facility] [place]   Location Address: [address] [place] [address], [address] [place] [address]  [id number]   Location Phone: [phone]          Chief Complaint  · \"Suicidal Ideation\"      History Of Present Illness  Elly Barrett is a 40 year old /White female who presents for evaluation and treatment of:      Pt called her Psychiatrist a few minutes before her appointment to discuss her suicidal thoughts and that she had a bag packed to go voluntarily to inpatient Psych through our ER at [facility].  Dr. Quinones spoke with me right before the appointment so we can discuss our plan of action to help her.  I informed the patient of our discussion and she says she knows she needs to go inpatient because she is only getting worse and losing more weight.  She also reports she gave her 2 guns to Eva who she nicknames \"mom\".  Pt drove herself here and to prevent her from harming herself or others we are calling [place] [employer] to Transport her directly to the ER. I will make sure her landlord is able to come get her car and take it home. I also informed her I would call and check on her daughter Natalia Simeon to make sure she was ok since Elly main concern was leaving Natalia home with the landlord living downstairs.  Patient was never alone in our office and was safely transferred over to the [facility] for transport.    Afterwards I update Dr. Quinones of patient status.       Past Medical History  Abdominal pain; Allergies; Allergy, Unspecified; Anxiety; Asthma; Attention Deficit Hyperactivity Disorder; Bipolar Disorder; Chronic idiopathic constipation; Constipation; Depression; Depression (emotion); Epigastric pain; " Frequent headaches; Hallucinations; Hemorrhoids, Unspecified, without Complications; Insomnia; Microscopic hematuria; Migraines; Nausea and vomiting; Reflux; Seasonal allergies; Suicidal thoughts; Tobacco abuse         Past Surgical History  Breast augmentation; Cesarian Section; EGD         Medication List  lamotrigine 200 mg oral tablet; olanzapine 10 mg oral tablet; ondansetron 8 mg oral tablet,disintegrating; Restoril 15 mg oral capsule         Allergy List  aspirin; Egg; Keflex; NSAIDS; PENICILLINS         Family Medical History  Heart Disease; CVA (Cerebrovascular accident); Diabetes, unspecified type; Osteoporosis; No family history of colorectal cancer; Family history of Arthritis         Reproductive History   0 Para 0 0 0 0       Social History  Alcohol (Never); Alcohol Use (Current - status unknown); Claustophobic (Unknown); Homemaker.; lives with children; lives with spouse; ; .; Recreational Drug Use (Never); Tobacco (Current every day); Unemployed.         Immunizations  Name Date Admin   Influenza Patient had disease   Influenza Patient had disease         Review of Systems  · Constitutional  o Admits  o : weight loss  o Denies  o : fatigue, fever, weight gain  · Eyes  o Denies  o : eye discomfort, eye pain  · HENT  o Denies  o : vertigo, nasal congestion  · Cardiovascular  o Denies  o : chest pain, irregular heart beats  · Respiratory  o Denies  o : shortness of breath, wheezing  · Gastrointestinal  o Denies  o : nausea, vomiting  · Genitourinary  o Denies  o : frequency, dysuria  · Integument  o Denies  o : rash, itching  · Neurologic  o Denies  o : muscular weakness, memory difficulties  · Musculoskeletal  o Denies  o : joint swelling, muscle pain  · Psychiatric  o Admits  o : anxiety, depression, difficulty sleeping, suicidal ideation  · Heme-Lymph  o Denies  o : lightheadedness, easy bleeding  · Allergic-Immunologic  o Denies  o : sinus allergy symptoms, allergic  "dermatitis      Vitals  Date Time BP Position Site L\R Cuff Size HR RR TEMP (F) WT  HT  BMI kg/m2 BSA m2 O2 Sat FR L/min FiO2 HC       04/06/2021 03:18 /70 Sitting    73 - R 14 98 129lbs 8oz 5'  7\" 20.28 1.67 98 %            Physical Examination  · Constitutional  o Appearance  o : well-nourished, in no acute distress  · Eyes  o Conjunctivae  o : conjunctivae normal  o Sclerae  o : sclerae white  o Pupils and Irises  o : pupils equal, round, and reactive to light and accommodation bilaterally  o Eyelids/Ocular Adnexae  o : eyelid appearance normal, no exudates present  · Ears, Nose, Mouth and Throat  o Ears  o :   § External Ears  § : external auditory canal appearance within normal limits, no discharge present  § Otoscopic Examination  § : tympanic membrane appearance within normal limits bilaterally  o Nose  o :   § External Nose  § : appearance normal  § Nasopharynx  § : no discharge present  o Oral Cavity  o :   § Oral Mucosa  § : oral mucosa normal  § Lips  § : lip appearance normal  o Throat  o :   § Oropharynx  § : no inflammation or lesions present, tonsils within normal limits  · Neck  o Range of Motion  o : cervical range of motion within normal limits  · Respiratory  o Respiratory Effort  o : breathing unlabored  o Inspection of Chest  o : normal appearance  o Auscultation of Lungs  o : normal breath sounds throughout  · Cardiovascular  o Heart  o :   § Auscultation of Heart  § : regular rate and rhythm, no murmurs, gallops or rubs  o Peripheral Vascular System  o :   § Extremities  § : no edema  · Gastrointestinal  o Abdominal Examination  o : abdomen nontender to palpation, tone normal without rigidity or guarding, no masses present, bowel sounds present in all four quadrants  · Lymphatic  o Neck  o : no lymphadenopathy present  · Skin and Subcutaneous Tissue  o General Inspection  o : no rashes or lesions present, no areas of discoloration  o Body Hair  o : scalp palpation normal, hair normal for " age, general body hair distribution normal for age  o Digits and Nails  o : no clubbing, cyanosis, deformities or edema present, normal appearing nails  · Neurologic  o Mental Status Examination  o :   § Orientation  § : grossly oriented to person, place and time  o Gait and Station  o : normal gait, able to stand without difficulty  · Psychiatric  o Judgement and Insight  o : judgment and insight intact  o Mood and Affect  o : mood normal, affect appropriate              Assessment  · Major depressive disorder     296.20/F32.2  · Suicidal ideation     V62.84/R45.851      Plan  · Orders  o ACO-17: Screened for tobacco use AND received tobacco cessation intervention (4004F) - - 04/06/2021   .5 ppd  o ACO-14: Influenza immunization was not administered for reasons documented [facility] () - - 04/06/2021   declined  o ACO-39: Current medications updated and reviewed (1159F, ) - - 04/06/2021  · Medications  o Medications have been Reconciled  o Transition of Care or Provider Policy  · Instructions  o Patient was educated/instructed on their diagnosis, treatment and medications prior to discharge from the clinic today.  o Minutes spent with patient including greater than 50% in Education/Counseling/Care Coordination.  o Time spent with the patient was minutes, more than 50% face to face. 45 minutes  · Disposition  o Sent to ER via Police            Electronically Signed by: Sofie Ku MD -Author on April 10, 2021 05:22:33 PM

## 2021-05-14 NOTE — PROGRESS NOTES
Progress Note      Patient Name: Elly Barrett   Patient ID: [id number]   Sex: Female   YOB: 1980    Primary Care Provider: Sofie Ku MD   Referring Provider: Sofie Ku MD    Visit Date: March 16, 2021    Provider: Sofie Ku MD   [facility]   Location Address: [address] [place] [address], [address] [place] [address]  [id number]   Location Phone: [phone]          Chief Complaint  · Bruising      History Of Present Illness  Elly Barrett is a 40 year old /White female who presents for evaluation and treatment of:      Pt has bruising on her left hip and legs.  Pt reports he just started Olanzapine last Friday and had to half her dose due to not being able to get out the bed at the 10mg dose.  Pt reports she is bruising and I will be checking her blood levels for changes that may be associated with her medication. Pt is now getting 4 hours of sleep a night since starting the Olanzapine.  Pt is asking about if she would qualify for disability due to her mental state.  With her history of Bipolar Depression and our need to constantly adjust her medications I feel she would meet the criteria but I would rather her be assessed for that with Psychiatry so I will refer this question back to Dr. Quinones.       Past Medical History  Abdominal Pain; Allergies; Allergy, Unspecified; Anxiety; Asthma; Attention Deficit Hyperactivity Disorder; Bipolar Disorder; Chronic idiopathic constipation; Constipation; Depression; Depression (emotion); Epigastric pain; Frequent headaches; Hallucinations; Hemorrhoids, Unspecified, without Complications; Insomnia; Microscopic hematuria; Migraines; Nausea and vomiting; Reflux; Seasonal allergies; Suicidal thoughts; Tobacco abuse         Past Surgical History  Breast augmentation; Cesarian Section; EGD         Medication List  lamotrigine 200 mg oral tablet; olanzapine 10 mg oral tablet; promethazine 25 mg oral tablet; Restoril  "15 mg oral capsule; scopolamine base 1 mg over 3 days transdermal patch 3 day         Allergy List  aspirin; Egg; Keflex; NSAIDS; PENICILLINS         Family Medical History  Heart Disease; CVA (Cerebrovascular accident); Diabetes, unspecified type; Osteoporosis; No family history of colorectal cancer; Family history of Arthritis         Reproductive History   0 Para 0 0 0 0       Social History  Alcohol (Never); Alcohol Use (Current - status unknown); Claustophobic (Unknown); Homemaker.; lives with children; lives with spouse; ; .; Recreational Drug Use (Never); Tobacco (Current every day); Unemployed.         Immunizations  Name Date Admin   Influenza Patient had disease   Influenza Patient had disease         Review of Systems  · Constitutional  o Denies  o : fatigue, fever, weight loss, weight gain  · Eyes  o Denies  o : eye discomfort, eye pain  · HENT  o Denies  o : vertigo, nasal congestion  · Cardiovascular  o Denies  o : chest pain, irregular heart beats  · Respiratory  o Denies  o : shortness of breath, wheezing  · Gastrointestinal  o Admits  o : nausea, vomiting, loss of appetite  · Genitourinary  o Denies  o : frequency, dysuria  · Integument  o Denies  o : rash, itching  · Neurologic  o Denies  o : muscular weakness, memory difficulties  · Musculoskeletal  o Denies  o : joint swelling, muscle pain  · Psychiatric  o Admits  o : anxiety, depression, difficulty sleeping  · Heme-Lymph  o Admits  o : easy bruising  o Denies  o : lightheadedness, easy bleeding  · Allergic-Immunologic  o Denies  o : sinus allergy symptoms, allergic dermatitis      Vitals  Date Time BP Position Site L\R Cuff Size HR RR TEMP (F) WT  HT  BMI kg/m2 BSA m2 O2 Sat FR L/min FiO2        2021 10:26 /70 Sitting    63 - R  98 139lbs 2oz 5'  7\" 21.79 1.73 98 %            Physical Examination  · Constitutional  o Appearance  o : well-nourished, in no acute distress  · Eyes  o Conjunctivae  o : conjunctivae " normal  o Sclerae  o : sclerae white  o Pupils and Irises  o : pupils equal, round, and reactive to light and accommodation bilaterally  o Eyelids/Ocular Adnexae  o : eyelid appearance normal, no exudates present  · Ears, Nose, Mouth and Throat  o Ears  o :   § External Ears  § : external auditory canal appearance within normal limits, no discharge present  § Otoscopic Examination  § : tympanic membrane appearance within normal limits bilaterally  o Nose  o :   § External Nose  § : appearance normal  § Nasopharynx  § : no discharge present  o Oral Cavity  o :   § Oral Mucosa  § : oral mucosa normal  § Lips  § : lip appearance normal  o Throat  o :   § Oropharynx  § : no inflammation or lesions present, tonsils within normal limits  · Neck  o Inspection/Palpation  o : normal appearance, no masses or tenderness, trachea midline  o Range of Motion  o : cervical range of motion within normal limits  o Thyroid  o : gland size normal, nontender, no nodules or masses present on palpation  o Jugular Veins  o : JVP normal  · Respiratory  o Respiratory Effort  o : breathing unlabored  o Inspection of Chest  o : normal appearance  o Auscultation of Lungs  o : normal breath sounds throughout  · Cardiovascular  o Heart  o :   § Auscultation of Heart  § : regular rate and rhythm, no murmurs, gallops or rubs  o Peripheral Vascular System  o :   § Extremities  § : no edema  · Gastrointestinal  o Abdominal Examination  o : abdomen nontender to palpation, tone normal without rigidity or guarding, no masses present, bowel sounds present in all four quadrants  o Liver and spleen  o : no hepatomegaly present, liver nontender to palpation, spleen not palpable  o Hernias  o : no hernias present  · Lymphatic  o Neck  o : no lymphadenopathy present  · Musculoskeletal  o Spine  o :   § Inspection/Palpation  § : no spinal tenderness, scoliosis or kyphosis present  § Stability  § : no subluxations present  § Range of Motion  § : spine range of  motion normal  o Right Upper Extremity  o :   § Inspection/Palpation  § : no tenderness to palpation, ROM normal  o Left Upper Extremity  o :   § Inspection/Palpation  § : no tenderness to palpation, ROM normal  o Right Lower Extremity  o :   § Inspection/Palpation  § : no joint or limb tenderness to palpation, ROM normal  o Left Lower Extremity  o :   § Inspection/Palpation  § : no joint or limb tenderness to palpation, ROM normal  · Skin and Subcutaneous Tissue  o General Inspection  o : no rashes or lesions present, no areas of discoloration  o Body Hair  o : scalp palpation normal, hair normal for age, general body hair distribution normal for age  o Digits and Nails  o : no clubbing, cyanosis, deformities or edema present, normal appearing nails  · Neurologic  o Mental Status Examination  o :   § Orientation  § : grossly oriented to person, place and time  o Gait and Station  o : normal gait, able to stand without difficulty  · Psychiatric  o Judgement and Insight  o : judgment and insight intact  o Mood and Affect  o : mood normal, affect appropriate              Assessment  · Insomnia, unspecified     780.52/G47.00  · Easy bruising     782.9/R23.8  · Bipolar depression     296.50/F31.9      Plan  · Orders  o CBC with Auto Diff H (49352) - 782.9/R23.8 - 03/16/2021  o Thyroid Profile (55368, 39645, THYII) - 782.9/R23.8 - 03/16/2021  o ACO-39: Current medications updated and reviewed (1159F, ) - - 03/16/2021  o PT/INR (68807) - 782.9/R23.8 - 03/16/2021  · Medications  o Medications have been Reconciled  o Transition of Care or Provider Policy  · Instructions  o Patient was educated/instructed on their diagnosis, treatment and medications prior to discharge from the clinic today.  o Minutes spent with patient including greater than 50% in Education/Counseling/Care Coordination.  o Time spent with the patient was minutes, more than 50% face to face. 25 minutes  · Disposition  o Return Visit Request in/on 2  weeks +/- 0 days ([id number]).            Electronically Signed by: Sofie Ku MD -Author on March 16, 2021 11:19:23 AM

## 2021-05-14 NOTE — PROGRESS NOTES
"   Progress Note      Patient Name: Elly Barrett   Patient ID: [id number]   Sex: Female   YOB: 1980    Primary Care Provider: Sofie Ku MD   Referring Provider: Sofie Ku MD    Visit Date: February 12, 2021    Provider: Stanley Quinones MD   Location: [facility]   Location Address: [address] [place] [address]  [address] [place] [address]  [id number]   Location Phone: [phone]          Chief Complaint     \"I am here.\"           History Of Present Illness  Elly Barrett is a 40 year old /White female who presents to the office today referred by Sofie Ku MD.   Chart review 12/15: Seen November 19th. Patient has a history of bipolar disorder. Lost her insurance, so they are trying to find medications that are covered on the $4 medication list. Discontinued Depakote because it made her \"shaky.\" She is on lamotrigine 400 mg p.o. daily, quetiapine 200 nightly, Restoril 30 mg nightly. No longer on diazepam 5 mg 3 times daily. June labs: LFTs within normal limits electrolytes within normal limits creatinine is 0.98. EKG 2018 shows heart rate 72, , sinus. MRI 2017 without contrast for headache shows no acute, with a few small foci of nonspecific gliosis which could be related to migraine disorder or early minimal chronic small vessel ischemic change.   Virtual visit via Zoom audio and video due to the COVID-19 pandemic. Patient is accepting of and agreeable to appointment. The appointment consisted of the patient and I only. Interview: Patient reports continued issues with sleeping. States that she has been up for 56 hours straight. Continues to have \"out of body experiences at night.\" However denies visual hallucinations. Notes rare auditory hallucinations. Mood is better. Notes SI without plan or intent. Protective factor continues to be her daughter.   ...   ...   ...       Past Medical History  Disease Name Date Onset Notes   Abdominal Pain --  --  "   Allergies --  --    Allergy, Unspecified --  allergies   Anxiety --  --    Asthma --  --    Attention Deficit Hyperactivity Disorder --  --    Bipolar Disorder --  psychiatric care   Chronic idiopathic constipation --  --    Constipation --  --    Depression --  --    Depression (emotion) --  --    Epigastric pain --  --    Frequent headaches --  --    Hallucinations --  --    Hemorrhoids, Unspecified, without Complications --  --    Insomnia --  --    Microscopic hematuria 09/10/2018 --    Migraines --  --    Nausea and vomiting --  --    Reflux --  --    Seasonal allergies --  --    Suicidal thoughts --  --    Tobacco abuse --  --          Past Surgical History  Procedure Name Date Notes   Breast augmentation --  --    Cesarian Section 2003 --    EGD 2018 [facility]         Medication List  Name Date Started Instructions   albuterol sulfate 2.5 mg /3 mL (0.083 %) inhalation solution for nebulization 06/22/2020 inhale 3 milliliters (2.5 mg) by nebulization route 3 times per day as needed for 30 days   hydroxyzine HCl 25 mg oral tablet 12/16/2020 TAKE 1 TABLET BY MOUTH THREE TIMES DAILY AS NEEDED FOR 30 DAYS   lamotrigine 200 mg oral tablet 11/19/2020 take 2 tablets (400 mg) by oral route once daily for 30 days   Linzess 145 mcg oral capsule 02/01/2021 TAKE 1 CAPSULE BY MOUTH ONCE DAILY ON AN EMPTY STOMACH AT LEAST 30 MINUTES BEFORE THE FIRST MEAL OF THE DAY FOR 90 DAYS   mirtazapine 7.5 mg oral tablet 01/21/2021 take 1 tablet (7.5 mg) by oral route once daily at bedtime for 90 days   promethazine 25 mg oral tablet 09/18/2020 Take 1 tablet by mouth once daily for 30 days   quetiapine 400 mg oral tablet 01/07/2021 take 2 tablets by oral route once a day (at bedtime) for 30 days   Restoril 30 mg oral capsule 01/05/2021 take 1 capsule (30 mg) by oral route once daily at bedtime as needed for 30 days   Spiriva Respimat 1.25 mcg/actuation inhalation mist 11/19/2020 inhale 2 puffs (2.5 mcg) by inhalation route once daily  for 30 days         Allergy List  Allergen Name Date Reaction Notes   aspirin --  --  --    Egg --  --  --    Keflex --  --  Blisters on face   NSAIDS --  --  --    PENICILLINS --  --  --        Allergies Reconciled  Family Medical History  Disease Name Relative/Age Notes   Heart Disease Father/   Father  Mother   CVA (Cerebrovascular accident) Father/   --    Diabetes, unspecified type Mother/   Mother   Osteoporosis Father/  Mother/   Mother; Father   No family history of colorectal cancer  --    Family history of Arthritis Father/   Father         Reproductive History  Menstrual   Last Menstrual Period: 2015   Pregnancy Summary   Total Pregnancies: 0 Full Term: 0 Premature: 0   Ab Induced: 0 Ab Spontaneous: 0 Ectopics: 0   Multiples: 0 Livin         Social History  Finding Status Start/Stop Quantity Notes   Alcohol Never --/-- --  2021 - 2021 - 12/15/2020 -    Alcohol Use Current - status unknown --/-- --  less than 1 drink per day   Claustophobic Unknown --/-- --  yes   Homemaker. --  --/-- --  --    lives with children --  --/-- --  --    lives with spouse --  --/-- --  --     --  --/-- --  --    . --  --/-- --  --    Recreational Drug Use Never --/-- --  2021 - 2021 - 12/15/2020 - no   Tobacco Current every day --/-- 1.5 PPD current every day smoker, 1.5 packs per day, smoked 11-20 years  current every day smoker, 1 packs per day, smoked 11-20 years   Unemployed. --  --/-- --  --          Immunizations  NameDate Admin Mfg Trade Name Lot Number Route Inj VIS Given VIS Publication   InfluenzaHad Disease  NE Not Entered  NE NE     Comments: declined         Review of Systems  · Constitutional  o Admits  o : fatigue  o Denies  o : night sweats  · Eyes  o Denies  o : double vision, blurred vision  · HENT  o Denies  o : vertigo, recent head injury  · Breasts  o Denies  o : abnormal changes in breast size, additional breast symptoms except as noted in the  "HPI  · Cardiovascular  o Admits  o : irregular heart beats  o Denies  o : chest pain  · Respiratory  o Denies  o : shortness of breath, productive cough  · Gastrointestinal  o Admits  o : nausea  o Denies  o : vomiting  · Genitourinary  o Denies  o : dysuria, urinary retention  · Integument  o Denies  o : hair growth change, new skin lesions  · Neurologic  o Denies  o : altered mental status, seizures  · Musculoskeletal  o Denies  o : joint swelling, limitation of motion  · Endocrine  o Denies  o : cold intolerance, heat intolerance  · Heme-Lymph  o Denies  o : petechiae, lymph node enlargement or tenderness  · Allergic-Immunologic  o Denies  o : frequent illnesses      Physical Examination  · Mental Status Exam  o Appearance  o : good eye contact, normal street clothes, groomed, sitting on a couch in a room in her house  o Behavior  o : pleasant and cooperative  o Motor  o : No abnormal  o Speech  o : normal rhythm, rate, volume, tone, not hyperverbal, not pressured, normal prosidy  o Mood  o : \"I am here\"  o Affect  o : Continued improvement. Euthymic, able to smile, no tears  o Thought Content  o : negative suicidal ideations, negative homicidal ideations, negative obsessions  o Perceptions  o : negative auditory hallucinations, negative visual hallucinations  o Thought Process  o : linear  o Insight/Judgement  o : fair/fair  o Cognition  o : grossly intact  o Attention  o : intact          Assessment  · Anxiety     300.02/F41.1  · Depression     296.31  · Tobacco abuse     305.1/Z72.0       Presentation most consistent with likely major depressive disorder, moderate, as well as complicated bereavement. Consider cluster B, histrionic, narcissistic, borderline. Questionable hallucinations.    Much better.  Euthymic.  Not manic.  Still having issues with sleep.  DISCONTINUE mirtazapine.  START Lunesta. Reduce dose of temazepam. Patient verbally signed controlled substances agreement. UDS already sent. Rick on " file. Continue other meds: melatonin, Lamictal.    Follow-up in 4 wks.    Patient declines psychotherapy.       Plan  · Orders  o ACO-39: Current medications updated and reviewed (1159F, ) - - 02/12/2021  · Medications  o eszopiclone 2 mg oral tablet   SIG: take 1 tablet (2 mg) by oral route once daily at bedtime for 30 days   DISP: (30) Tablet with 2 refills  Prescribed on 02/12/2021     o Restoril 15 mg oral capsule   SIG: take 1 capsule (15 mg) by oral route once daily at bedtime as needed for 30 days   DISP: (30) Capsule with 2 refills  Adjusted on 02/12/2021     o olanzapine 5 mg oral tablet   SIG: Take 1 tab (5 mg) PO QHS for 3 days, then increase to 2 tab (10 mg) PO QHS   DISP: (57) Tablet with 1 refills  Discontinued on 02/12/2021     o mirtazapine 7.5 mg oral tablet   SIG: take 1 tablet (7.5 mg) by oral route once daily at bedtime for 90 days   DISP: (90) Tablet with 1 refills  Discontinued on 02/12/2021     o Medications have been Reconciled  o Transition of Care or Provider Policy  · Instructions  o Patient understands to call 911 or go to the emergency room if for any reason she has thoughts of hurting herself or other people. Reiterated with her again today.  o Risk Assessment: Risk of self-harm acutely is high, not imminent. Risk factors include severe recent psychosocial stressor, mood disorder, access to weapons, history of self-harm and suicide attempt, recent self-harm. Protective factors include no family history, no present SI, minimal AODA, healthcare seeking, future orientation, willingness to engage in care, support from her daughter. Risk of self-harm chronically is also high, but could be further elevated in the event of treatment noncompliance and/or AODA.  o Chronic Risk:   o Safety: No acute safety concerns.  o Medications: CONTINUE melatonin 3 mg PO QHS. CONTINUE lamotrigine 400 mg p.o. daily. CONTINUE quetiapine 800 mg PO QHS. Risks, benefits, alternatives discussed with patient  including nausea and vomiting, GI upset, sedation, akathisia, hypotension, increased appetite, lowering of seizure threshold, theoretical risk of tardive dyskinesia. After discussion of these risks and benefits, the patient voiced understanding and agreed to proceed. REDUCE temazepam to 15 mg PO QHS. START eszoplicone 2 mg PO QHS. Risks, benefits, alternatives discussed with patient including sedation, dizziness, falls risk, GI upset, amnesia, grogginess the following day. After discussion of these risks and benefits, the patient voiced understanding and agreed to proceed. DISCONTINUE mirtazapine 7.5 mg. *** S/P trazodone for insomnia (no effect).  o Therapy: Declines.  o Follow Up: 4 week.  · Disposition  o Follow Up in 1 month.            Electronically Signed by: Stanley Quinones MD -Author on February 12, 2021 06:31:25 PM

## 2021-05-14 NOTE — PROGRESS NOTES
"   Progress Note      Patient Name: Elly Barrett   Patient ID: [id number]   Sex: Female   YOB: 1980    Primary Care Provider: Sofie Ku MD   Referring Provider: Sofie Ku MD    Visit Date: April 16, 2021    Provider: Sofie Ku MD   Location: [facility]   Location Address: [address] [place] [address], [address] [place] [address]  [id number]   Location Phone: [phone]          Chief Complaint  · Follow up from Inpatient Psych      History Of Present Illness  Elly Barrett is a 40 year old /White female who presents for evaluation and treatment of:      Pt is here in the office today with her biological mother who is very concerned that Elly's progress in the last few days is only temporary.  Pt reports that she feels much better and is upset about her daughter leaving home.  Pt said repeatedly that she does not feel suicidal but if those thoughts come back she would voluntarily go back to [facility].  Her mother asked her multiple times that if she does go back will she agree to stay there longer if \"Dr. Ku says you need to stay longer\".  Pt agreed that she would stay longer if she has to go back in.  Pt also agreed that if she had thoughts of suicide she would contact my office. Pt reports she is going to try to apply for disability again.  Pt reports she is eating better and has gained weight.  There are no signs of cutting on her arms.  No visible self-harm markings.    Trisomy X- pt mom says she was told many years ago that she was Trisomy X but they were not able to get records.  She is now trying to get disability and thinks that her genetic condition might explain some of her mental health issues.       Past Medical History  Abdominal pain; Allergies; Allergy, Unspecified; Anxiety; Asthma; Attention Deficit Hyperactivity Disorder; Bipolar Disorder; Chronic idiopathic constipation; Constipation; Depression; Depression (emotion); Epigastric pain; " Frequent headaches; Hallucinations; Hemorrhoids, Unspecified, without Complications; Insomnia; Microscopic hematuria; Migraines; Nausea and vomiting; Reflux; Seasonal allergies; Suicidal thoughts; Tobacco abuse         Past Surgical History  Breast augmentation; Cesarian Section; EGD         Medication List  aripiprazole 5 mg oral tablet; lamotrigine 200 mg oral tablet; ondansetron 8 mg oral tablet,disintegrating; Restoril 30 mg oral capsule; venlafaxine 75 mg oral capsule,extended release 24hr; Zyprexa 10 mg oral tablet         Allergy List  aspirin; Egg; Keflex; NSAIDS; PENICILLINS         Family Medical History  Heart Disease; CVA (Cerebrovascular accident); Diabetes, unspecified type; Osteoporosis; No family history of colorectal cancer; Family history of Arthritis         Reproductive History   0 Para 0 0 0 0       Social History  Alcohol (Never); Alcohol Use (Current - status unknown); Claustophobic (Unknown); Homemaker.; lives with children; lives with spouse; ; .; Recreational Drug Use (Never); Tobacco (Current every day); Unemployed.         Immunizations  Name Date Admin   Influenza Patient had disease   Influenza Patient had disease         Review of Systems  · Constitutional  o Admits  o : weight gain  o Denies  o : fatigue, night sweats  · Eyes  o Denies  o : double vision, blurred vision  · HENT  o Denies  o : vertigo, recent head injury  · Breasts  o Denies  o : abnormal changes in breast size, additional breast symptoms except as noted in the HPI  · Cardiovascular  o Denies  o : chest pain, irregular heart beats  · Respiratory  o Denies  o : shortness of breath, productive cough  · Gastrointestinal  o Denies  o : nausea, vomiting  · Genitourinary  o Denies  o : dysuria, urinary retention  · Integument  o Denies  o : hair growth change, new skin lesions  · Neurologic  o Denies  o : altered mental status, seizures  · Musculoskeletal  o Denies  o : joint swelling, limitation of  "motion  · Endocrine  o Denies  o : cold intolerance, heat intolerance  · Psychiatric  o Admits  o : anxiety, depression, difficulty sleeping  o Denies  o : suicidal ideation, homicidal ideation  · Heme-Lymph  o Denies  o : petechiae, lymph node enlargement or tenderness  · Allergic-Immunologic  o Denies  o : frequent illnesses      Vitals  Date Time BP Position Site L\R Cuff Size HR RR TEMP (F) WT  HT  BMI kg/m2 BSA m2 O2 Sat FR L/min FiO2 HC       04/16/2021 04:17 /74 Sitting    75 - R  97.9 134lbs 2oz 5'  7\" 21.01 1.7 97 %            Physical Examination  · Constitutional  o Appearance  o : well-nourished, in no acute distress  · Eyes  o Conjunctivae  o : conjunctivae normal  o Sclerae  o : sclerae white  o Pupils and Irises  o : pupils equal, round, and reactive to light and accommodation bilaterally  o Eyelids/Ocular Adnexae  o : eyelid appearance normal, no exudates present  · Ears, Nose, Mouth and Throat  o Ears  o :   § External Ears  § : external auditory canal appearance within normal limits, no discharge present  § Otoscopic Examination  § : tympanic membrane appearance within normal limits bilaterally  o Nose  o :   § External Nose  § : appearance normal  § Nasopharynx  § : no discharge present  o Oral Cavity  o :   § Oral Mucosa  § : oral mucosa normal  § Lips  § : lip appearance normal  o Throat  o :   § Oropharynx  § : no inflammation or lesions present, tonsils within normal limits  · Neck  o Inspection/Palpation  o : normal appearance, no masses or tenderness, trachea midline  o Range of Motion  o : cervical range of motion within normal limits  o Thyroid  o : gland size normal, nontender, no nodules or masses present on palpation  o Jugular Veins  o : JVP normal  · Respiratory  o Respiratory Effort  o : breathing unlabored  o Inspection of Chest  o : normal appearance  o Auscultation of Lungs  o : normal breath sounds throughout  · Cardiovascular  o Heart  o :   § Auscultation of Heart  § : " regular rate and rhythm, no murmurs, gallops or rubs  o Peripheral Vascular System  o :   § Extremities  § : no edema  · Gastrointestinal  o Abdominal Examination  o : abdomen nontender to palpation, tone normal without rigidity or guarding, no masses present, bowel sounds present in all four quadrants  o Liver and spleen  o : no hepatomegaly present, liver nontender to palpation, spleen not palpable  o Hernias  o : no hernias present  · Lymphatic  o Neck  o : no lymphadenopathy present  · Musculoskeletal  o Spine  o :   § Inspection/Palpation  § : no spinal tenderness, scoliosis or kyphosis present  § Stability  § : no subluxations present  § Range of Motion  § : spine range of motion normal  o Right Upper Extremity  o :   § Inspection/Palpation  § : no tenderness to palpation, ROM normal  o Left Upper Extremity  o :   § Inspection/Palpation  § : no tenderness to palpation, ROM normal  o Right Lower Extremity  o :   § Inspection/Palpation  § : no joint or limb tenderness to palpation, ROM normal  o Left Lower Extremity  o :   § Inspection/Palpation  § : no joint or limb tenderness to palpation, ROM normal  · Skin and Subcutaneous Tissue  o General Inspection  o : no rashes or lesions present, no areas of discoloration  o Body Hair  o : scalp palpation normal, hair normal for age, general body hair distribution normal for age  o Digits and Nails  o : no clubbing, cyanosis, deformities or edema present, normal appearing nails  · Neurologic  o Mental Status Examination  o :   § Orientation  § : grossly oriented to person, place and time  o Gait and Station  o : normal gait, able to stand without difficulty  · Psychiatric  o Judgement and Insight  o : judgment and insight intact  o Mood and Affect  o : mood normal, affect appropriate          Assessment  · Major depressive disorder     296.20/F32.2  · Trisomy X syndrome     758.81/Q97.0      Plan  · Orders  o ACO-39: Current medications updated and reviewed (,  1159F) - - 04/16/2021  o Genetic Consultation (MICHELE) - 758.81/Q97.0 - 04/19/2021  · Medications  o Medications have been Reconciled  o Transition of Care or Provider Policy  · Instructions  o Patient was educated/instructed on their diagnosis, treatment and medications prior to discharge from the clinic today.  o Minutes spent with patient including greater than 50% in Education/Counseling/Care Coordination.  o Time spent with the patient was minutes, more than 50% face to face. 25 minutes  · Disposition  o Return Visit Request in/on 3 days +/- 0 days ([id number]).            Electronically Signed by: Sofie Ku MD -Author on April 19, 2021 07:16:52 AM

## 2021-05-14 NOTE — PROGRESS NOTES
"   Progress Note      Patient Name: Elly Barrett   Patient ID: [id number]   Sex: Female   YOB: 1980    Primary Care Provider: Sofie Ku MD   Referring Provider: Sofie Ku MD    Visit Date: January 21, 2021    Provider: Stanley Quinones MD   Location: [facility]   Location Address: [address] [place] [address]  [address] [place] [address]  [id number]   Location Phone: [phone]          Chief Complaint     \"My mood is off and on.\"           History Of Present Illness  Elly Barrett is a 40 year old /White female who presents to the office today referred by Sofie Ku MD.   Chart review 12/15: Seen November 19th. Patient has a history of bipolar disorder. Lost her insurance, so they are trying to find medications that are covered on the $4 medication list. Discontinued Depakote because it made her \"shaky.\" She is on lamotrigine 400 mg p.o. daily, quetiapine 200 nightly, Restoril 30 mg nightly. No longer on diazepam 5 mg 3 times daily. June labs: LFTs within normal limits electrolytes within normal limits creatinine is 0.98. EKG 2018 shows heart rate 72, , sinus. MRI 2017 without contrast for headache shows no acute, with a few small foci of nonspecific gliosis which could be related to migraine disorder or early minimal chronic small vessel ischemic change.   Virtual visit via Zoom audio and video due to the COVID-19 pandemic. Patient is accepting of and agreeable to appointment. The appointment consisted of the patient and I only. Interview: Patient is in the car again, but then went inside to her house to let out her dogs and was in her kitchen. States she continues to have out of body experiences at night. States she thinks she might be falling out of her bed and then crawling back in to it afterwards because sometimes she is sore in the mornings. Also informed me that she was feeling anxious during the day and took one of her friends Ativan and \"her " "anxiety calmed right down.\" She asked if she could be started on Ativan. States trazodone does not help with insomnia. States she is sleeping 2 to 3 hours a night, and so she is aggravated and tired in the mornings. No MALIK ROTH. Reiterated that she has stopped cutting herself after her daughter found out.   ...   ...   ...       Past Medical History  Disease Name Date Onset Notes   Abdominal Pain --  --    Allergies --  --    Allergy, Unspecified --  allergies   Anxiety --  --    Asthma --  --    Attention Deficit Hyperactivity Disorder --  --    Bipolar Disorder --  psychiatric care   Chronic idiopathic constipation --  --    Constipation --  --    Depression --  --    Epigastric pain --  --    Frequent headaches --  --    Hallucinations --  --    Hemorrhoids, Unspecified, without Complications --  --    Microscopic hematuria 09/10/2018 --    Migraines --  --    Nausea and vomiting --  --    Reflux --  --    Seasonal allergies --  --    Suicidal thoughts --  --    Tobacco abuse --  --          Past Surgical History  Procedure Name Date Notes   Breast augmentation --  --    Cesarian Section 2003 --    EGD 2018 [facility]         Medication List  Name Date Started Instructions   albuterol sulfate 2.5 mg /3 mL (0.083 %) inhalation solution for nebulization 06/22/2020 inhale 3 milliliters (2.5 mg) by nebulization route 3 times per day as needed for 30 days   hydroxyzine HCl 25 mg oral tablet 12/16/2020 TAKE 1 TABLET BY MOUTH THREE TIMES DAILY AS NEEDED FOR 30 DAYS   lamotrigine 200 mg oral tablet 11/19/2020 take 2 tablets (400 mg) by oral route once daily for 30 days   Montelukast Sodium 10 MG Oral Tablet 01/08/2021 TAKE 1 TABLET BY MOUTH ONCE DAILY IN THE EVENING FOR 90 DAYS   promethazine 25 mg oral tablet 09/18/2020 Take 1 tablet by mouth once daily for 30 days   quetiapine 400 mg oral tablet 01/07/2021 take 2 tablets by oral route once a day (at bedtime) for 30 days   Restoril 30 mg oral capsule 01/05/2021 take 1 " capsule (30 mg) by oral route once daily at bedtime as needed for 30 days   Spiriva Respimat 1.25 mcg/actuation inhalation mist 2020 inhale 2 puffs (2.5 mcg) by inhalation route once daily for 30 days   trazodone 100 mg oral tablet 2021 take 2 tablets by oral route once a day (at bedtime) for 60 days         Allergy List  Allergen Name Date Reaction Notes   aspirin --  --  --    Egg --  --  --    Keflex --  --  Blisters on face   NSAIDS --  --  --    PENICILLINS --  --  --          Family Medical History  Disease Name Relative/Age Notes   Heart Disease Father/   Father  Mother   CVA (Cerebrovascular accident) Father/   --    Diabetes, unspecified type Mother/   Mother   Osteoporosis Father/  Mother/   Mother; Father   No family history of colorectal cancer  --    Family history of Arthritis Father/   Father         Reproductive History  Menstrual   Last Menstrual Period: 2015   Pregnancy Summary   Total Pregnancies: 0 Full Term: 0 Premature: 0   Ab Induced: 0 Ab Spontaneous: 0 Ectopics: 0   Multiples: 0 Livin         Social History  Finding Status Start/Stop Quantity Notes   Alcohol Never --/-- --  12/15/2020 -    Alcohol Use Current - status unknown --/-- --  less than 1 drink per day   Claustophobic Unknown --/-- --  yes   Homemaker. --  --/-- --  --    lives with children --  --/-- --  --    lives with spouse --  --/-- --  --     --  --/-- --  --    . --  --/-- --  --    Recreational Drug Use Never --/-- --  12/15/2020 - no   Tobacco Current every day --/-- 1.5 PPD current every day smoker, 1.5 packs per day, smoked 11-20 years  current every day smoker, 1 packs per day, smoked 11-20 years   Unemployed. --  --/-- --  --          Immunizations  NameDate Admin Mfg Trade Name Lot Number Route Inj VIS Given VIS Publication   InfluenzaHad Disease  NE Not Entered  NE NE     Comments: declined         Review of Systems  · Constitutional  o Admits  o : fatigue  o Denies  o : night  "sweats  · Eyes  o Denies  o : double vision, blurred vision  · HENT  o Denies  o : vertigo, recent head injury  · Breasts  o Denies  o : abnormal changes in breast size, additional breast symptoms except as noted in the HPI  · Cardiovascular  o Admits  o : rapid heart rate  o Denies  o : chest pain, irregular heart beats  · Respiratory  o Denies  o : shortness of breath, productive cough  · Gastrointestinal  o Denies  o : nausea, vomiting  · Genitourinary  o Denies  o : dysuria, urinary retention  · Integument  o Denies  o : hair growth change, new skin lesions  · Neurologic  o Denies  o : altered mental status, seizures  · Musculoskeletal  o Denies  o : joint swelling, limitation of motion  · Endocrine  o Denies  o : cold intolerance, heat intolerance  · Psychiatric  o Admits  o : anxiety, hallucinations, difficulty sleeping  · Heme-Lymph  o Denies  o : petechiae, lymph node enlargement or tenderness  · Allergic-Immunologic  o Denies  o : frequent illnesses      Physical Examination  · Mental Status Exam  o Appearance  o : good eye contact, normal street clothes, groomed, sitting in the  seat of her car, and then standing in her kitchen  o Behavior  o : pleasant and cooperative  o Motor  o : No abnormal  o Speech  o : normal rhythm, rate, volume, tone, not hyperverbal, not pressured, normal prosidy  o Mood  o : \"Off and on\"  o Affect  o : Slightly constricted, no tears, improved from when I initially saw her  o Thought Content  o : negative suicidal ideations, negative homicidal ideations, negative obsessions  o Perceptions  o : negative auditory hallucinations, negative visual hallucinations  o Thought Process  o : linear  o Insight/Judgement  o : fair/fair  o Cognition  o : grossly intact  o Attention  o : intact          Assessment  · Tobacco abuse counseling       Tobacco abuse counseling     V65.42/Z71.6  · Anxiety     300.02/F41.1  · Depression     296.31  · Tobacco abuse     305.1/Z72.0     " "  Presentation most consistent with likely major depressive disorder, most recent episode severe, as well as complicated bereavement.  Patient reports hallucinations, but I am starting to suspect personality disorder, or hypnopompic hallucinations, as patient's hallucinations do not seem typical of someone with psychotic features (mood incongruent snakes coming out of the walls, out of body experiences).  Consider cluster B, histrionic, narcissistic, borderline.    Stable if not improved.  Still having issues with sleep.  Discontinue trazodone as it has no effect, and start mirtazapine.  Continue other meds: quetiapine, melatonin, Restoril, Lamictal.    Since I suspect a personality disorder overlayed on major depressive disorder, and not bipolar disorder, I should consider tapering off of Seroquel and onto a different medication.    Patient asked to start Ativan today after taking one of her friend's pills and \"calming right down.\"  I counseled her that Ativan has not good long-term strategy for management of anxiety.  There is some concern for drug-seeking.    Follow-up in 2 wks.    Patient declines psychotherapy.       Plan  · Orders  o YASMANY Report (KASPR) - 300.02/F41.1, 296.31, 305.1/Z72.0, V65.42/Z71.6 - 01/21/2021  o ACO-39: Current medications updated and reviewed (1159F, ) - - 01/21/2021  · Medications  o mirtazapine 7.5 mg oral tablet   SIG: take 1 tablet (7.5 mg) by oral route once daily at bedtime for 90 days   DISP: (90) Tablet with 1 refills  Prescribed on 01/21/2021     o trazodone 100 mg oral tablet   SIG: take 2 tablets by oral route once a day (at bedtime) for 60 days   DISP: (120) Tablet with 1 refills  Discontinued on 01/21/2021     o Medications have been Reconciled  o Transition of Care or Provider Policy  · Instructions  o Tobacco and smoking cessation counseling for more than 3 minutes was completed.  o Patient understands to call 911 or go to the emergency room if for any reason she " has thoughts of hurting herself or other people. Reiterated with her again today.  o Risk Assessment: Risk of self-harm acutely is high, not imminent. Risk factors include severe recent psychosocial stressor, mood disorder, access to weapons, history of self-harm and suicide attempt, recent self-harm. Protective factors include no family history, no present SI, minimal AODA, healthcare seeking, future orientation, willingness to engage in care, support from her daughter. Risk of self-harm chronically is also high, but could be further elevated in the event of treatment noncompliance and/or AODA.  o Safety: No acute safety concerns.  o Medications: CONTINUE melatonin 3 mg PO QHS. CONTINUE Restoril 30 mg p.o. daily, lamotrigine 400 mg p.o. daily. INCREASE quetiapine from 600 mg to 800 mg PO QHS to target depression, poor appetite, hallucinations. Risks, benefits, alternatives discussed with patient including nausea and vomiting, GI upset, sedation, akathisia, hypotension, increased appetite, lowering of seizure threshold, theoretical risk of tardive dyskinesia. After discussion of these risks and benefits, the patient voiced understanding and agreed to proceed. START mirtazapine 7.5 mg p.o. nightly to target insomnia, depression, anxiety. Risks, benefits, alternatives discussed with patient including GI upset, sedation, dizziness, increased appetite. After discussion of these risks and benefits, the patient voiced understanding and agreed to proceed. *** S/P trazodone for insomnia (no effect).  o Therapy: Declines.  o Follow Up: 2 week.  · Disposition  o Follow Up in 2 weeks.            Electronically Signed by: Stanley Quinones MD -Author on January 21, 2021 11:29:32 AM

## 2021-05-15 VITALS
HEART RATE: 89 BPM | TEMPERATURE: 97.8 F | OXYGEN SATURATION: 100 % | HEIGHT: 67 IN | SYSTOLIC BLOOD PRESSURE: 112 MMHG | DIASTOLIC BLOOD PRESSURE: 70 MMHG | RESPIRATION RATE: 14 BRPM | BODY MASS INDEX: 26.84 KG/M2 | WEIGHT: 171 LBS

## 2021-05-15 VITALS
SYSTOLIC BLOOD PRESSURE: 120 MMHG | TEMPERATURE: 98.9 F | HEIGHT: 67 IN | HEART RATE: 78 BPM | BODY MASS INDEX: 27.35 KG/M2 | OXYGEN SATURATION: 99 % | WEIGHT: 174.25 LBS | DIASTOLIC BLOOD PRESSURE: 70 MMHG

## 2021-05-15 VITALS
DIASTOLIC BLOOD PRESSURE: 74 MMHG | TEMPERATURE: 98 F | WEIGHT: 153.12 LBS | OXYGEN SATURATION: 96 % | HEART RATE: 77 BPM | SYSTOLIC BLOOD PRESSURE: 114 MMHG | HEIGHT: 67 IN | BODY MASS INDEX: 24.03 KG/M2

## 2021-05-15 VITALS — OXYGEN SATURATION: 96 % | HEIGHT: 67 IN | WEIGHT: 164 LBS | HEART RATE: 76 BPM | BODY MASS INDEX: 25.74 KG/M2

## 2021-05-15 VITALS
WEIGHT: 162.5 LBS | OXYGEN SATURATION: 97 % | SYSTOLIC BLOOD PRESSURE: 122 MMHG | DIASTOLIC BLOOD PRESSURE: 78 MMHG | RESPIRATION RATE: 14 BRPM | HEIGHT: 67 IN | BODY MASS INDEX: 25.51 KG/M2 | HEART RATE: 97 BPM | TEMPERATURE: 98 F

## 2021-05-15 VITALS
WEIGHT: 160 LBS | HEIGHT: 67 IN | RESPIRATION RATE: 14 BRPM | HEART RATE: 66 BPM | OXYGEN SATURATION: 98 % | SYSTOLIC BLOOD PRESSURE: 130 MMHG | TEMPERATURE: 97.8 F | DIASTOLIC BLOOD PRESSURE: 82 MMHG | BODY MASS INDEX: 25.11 KG/M2

## 2021-05-15 VITALS
HEART RATE: 98 BPM | BODY MASS INDEX: 24.7 KG/M2 | WEIGHT: 157.37 LBS | HEIGHT: 67 IN | TEMPERATURE: 97.9 F | OXYGEN SATURATION: 98 % | SYSTOLIC BLOOD PRESSURE: 120 MMHG | DIASTOLIC BLOOD PRESSURE: 78 MMHG

## 2021-05-15 VITALS
WEIGHT: 170 LBS | SYSTOLIC BLOOD PRESSURE: 110 MMHG | HEIGHT: 67 IN | HEART RATE: 92 BPM | OXYGEN SATURATION: 98 % | BODY MASS INDEX: 26.68 KG/M2 | TEMPERATURE: 98 F | DIASTOLIC BLOOD PRESSURE: 70 MMHG

## 2021-05-15 VITALS
HEART RATE: 80 BPM | HEIGHT: 67 IN | DIASTOLIC BLOOD PRESSURE: 64 MMHG | WEIGHT: 150.5 LBS | TEMPERATURE: 98 F | SYSTOLIC BLOOD PRESSURE: 106 MMHG | OXYGEN SATURATION: 98 % | BODY MASS INDEX: 23.62 KG/M2

## 2021-05-15 VITALS
SYSTOLIC BLOOD PRESSURE: 120 MMHG | HEIGHT: 67 IN | HEART RATE: 80 BPM | DIASTOLIC BLOOD PRESSURE: 70 MMHG | WEIGHT: 155.25 LBS | TEMPERATURE: 98.3 F | OXYGEN SATURATION: 97 % | BODY MASS INDEX: 24.37 KG/M2

## 2021-05-15 VITALS
BODY MASS INDEX: 25.78 KG/M2 | TEMPERATURE: 99.3 F | HEIGHT: 67 IN | RESPIRATION RATE: 14 BRPM | SYSTOLIC BLOOD PRESSURE: 110 MMHG | HEART RATE: 92 BPM | WEIGHT: 164.25 LBS | OXYGEN SATURATION: 96 % | DIASTOLIC BLOOD PRESSURE: 68 MMHG

## 2021-05-15 VITALS — RESPIRATION RATE: 14 BRPM | HEIGHT: 67 IN | WEIGHT: 147 LBS | BODY MASS INDEX: 23.07 KG/M2

## 2021-05-15 VITALS
DIASTOLIC BLOOD PRESSURE: 72 MMHG | BODY MASS INDEX: 25.11 KG/M2 | SYSTOLIC BLOOD PRESSURE: 112 MMHG | WEIGHT: 160 LBS | HEART RATE: 99 BPM | OXYGEN SATURATION: 98 % | TEMPERATURE: 97.5 F | RESPIRATION RATE: 14 BRPM | HEIGHT: 67 IN

## 2021-05-15 VITALS
TEMPERATURE: 97.5 F | HEART RATE: 87 BPM | RESPIRATION RATE: 14 BRPM | DIASTOLIC BLOOD PRESSURE: 78 MMHG | BODY MASS INDEX: 24.64 KG/M2 | SYSTOLIC BLOOD PRESSURE: 120 MMHG | HEIGHT: 67 IN | OXYGEN SATURATION: 97 % | WEIGHT: 157 LBS

## 2021-05-16 VITALS
BODY MASS INDEX: 23.09 KG/M2 | WEIGHT: 147.12 LBS | SYSTOLIC BLOOD PRESSURE: 108 MMHG | DIASTOLIC BLOOD PRESSURE: 62 MMHG | HEART RATE: 86 BPM | RESPIRATION RATE: 20 BRPM | OXYGEN SATURATION: 97 % | HEIGHT: 67 IN | TEMPERATURE: 98.2 F

## 2021-05-16 VITALS
RESPIRATION RATE: 16 BRPM | TEMPERATURE: 98 F | DIASTOLIC BLOOD PRESSURE: 70 MMHG | BODY MASS INDEX: 23.14 KG/M2 | HEIGHT: 66 IN | WEIGHT: 144 LBS | OXYGEN SATURATION: 99 % | HEART RATE: 78 BPM | SYSTOLIC BLOOD PRESSURE: 110 MMHG

## 2021-05-16 VITALS — BODY MASS INDEX: 23.15 KG/M2 | WEIGHT: 147.5 LBS | RESPIRATION RATE: 12 BRPM | HEIGHT: 67 IN

## 2021-05-16 VITALS
SYSTOLIC BLOOD PRESSURE: 114 MMHG | TEMPERATURE: 98.9 F | RESPIRATION RATE: 18 BRPM | OXYGEN SATURATION: 98 % | HEART RATE: 100 BPM | WEIGHT: 146.12 LBS | DIASTOLIC BLOOD PRESSURE: 72 MMHG | BODY MASS INDEX: 22.93 KG/M2 | HEIGHT: 67 IN

## 2021-05-16 VITALS
TEMPERATURE: 98 F | RESPIRATION RATE: 16 BRPM | HEIGHT: 66 IN | OXYGEN SATURATION: 100 % | HEART RATE: 85 BPM | WEIGHT: 149 LBS | DIASTOLIC BLOOD PRESSURE: 78 MMHG | BODY MASS INDEX: 23.95 KG/M2 | SYSTOLIC BLOOD PRESSURE: 121 MMHG

## 2021-05-16 VITALS
HEART RATE: 84 BPM | SYSTOLIC BLOOD PRESSURE: 108 MMHG | WEIGHT: 146 LBS | HEIGHT: 67 IN | DIASTOLIC BLOOD PRESSURE: 68 MMHG | BODY MASS INDEX: 22.91 KG/M2

## 2021-05-16 LAB — BACTERIA UR CULT: NORMAL

## 2021-06-04 ENCOUNTER — CONVERSION ENCOUNTER (OUTPATIENT)
Dept: FAMILY MEDICINE CLINIC | Facility: CLINIC | Age: 41
End: 2021-06-04

## 2021-06-04 ENCOUNTER — OFFICE VISIT CONVERTED (OUTPATIENT)
Dept: FAMILY MEDICINE CLINIC | Facility: CLINIC | Age: 41
End: 2021-06-04
Attending: FAMILY MEDICINE

## 2021-06-05 NOTE — PROGRESS NOTES
Progress Note      Patient Name: Elly Barrett   Patient ID: [id number]   Sex: Female   YOB: 1980    Primary Care Provider: Sofie Ku MD   Referring Provider: Sofie Ku MD    Visit Date: June 4, 2021    Provider: Sofie Ku MD   Location: [facility]   Location Address: [address] [place] [address], [address] [place] [address]  [id number]   Location Phone: [phone]          Chief Complaint  · Depression follow up      History Of Present Illness  Elly Barrett is a 40 year old /White female who presents for evaluation and treatment of:      Depression- pt anniversary with her  is coming up this sunday.  Pt reports she just bought a new car and just got back pay with [employer] insurance.  Pt is now making $1350 money a month.  Patient reports that she will be going to [place] on Erika 10 and will be going with her mom to visit her aunt who lives in [place].  Patient also reports that she has taken her daughter off her insurance so that she can start taking care of herself and her responsibilities.  Patient's relationship with her daughter is strained.    Insomniapatient reports that she had taken Seroquel Seroquel that she had to help herself sleep in the last 2 days and the first medicine taking she slept 15 hours and the second night she slept 8.  Patient was not she can get back on Seroquel to help with sleep at night.  Patient want to take the Seroquel and continue taking her Restoril.       Past Medical History  Abdominal pain; Allergies; Allergy, Unspecified; Anxiety; Asthma; Attention Deficit Hyperactivity Disorder; Bipolar Disorder; Chronic idiopathic constipation; Constipation; Depression; Depression (emotion); Epigastric pain; Frequent headaches; Hallucinations; Hemorrhoids, Unspecified, without Complications; Insomnia; Major depressive disorder, recurrent, moderate; Microscopic hematuria; Migraines; Nausea and vomiting; Reflux; Seasonal allergies;  Suicidal thoughts; Tobacco abuse         Past Surgical History  Breast augmentation; Cesarian Section; EGD         Medication List  aripiprazole 5 mg oral tablet; lamotrigine 200 mg oral tablet; ondansetron 8 mg oral tablet,disintegrating; Restoril 30 mg oral capsule; venlafaxine 37.5 mg oral capsule,extended release 24hr         Allergy List  aspirin; Codiene; Egg; Keflex; NSAIDS; PENICILLINS       Allergies Reconciled  Family Medical History  Heart Disease; CVA (Cerebrovascular accident); Diabetes, unspecified type; Osteoporosis; No family history of colorectal cancer; Family history of Arthritis         Reproductive History   0 Para 0 0 0 0       Social History  Alcohol (Never); Alcohol Use (Current - status unknown); Claustophobic (Unknown); Homemaker.; lives with children; lives with spouse; ; .; Recreational Drug Use (Never); Tobacco (Current every day); Unemployed.         Immunizations  Name Date Admin   Influenza Patient had disease   Influenza Patient had disease         Review of Systems  · Constitutional  o Denies  o : fatigue, fever, weight loss, weight gain  · Eyes  o Denies  o : eye discomfort, eye pain  · HENT  o Denies  o : vertigo, nasal congestion  · Cardiovascular  o Denies  o : chest pain, irregular heart beats  · Respiratory  o Denies  o : shortness of breath, wheezing  · Gastrointestinal  o Denies  o : nausea, vomiting  · Genitourinary  o Denies  o : frequency, dysuria  · Integument  o Denies  o : rash, itching  · Neurologic  o Denies  o : muscular weakness, memory difficulties  · Musculoskeletal  o Denies  o : joint swelling, muscle pain  · Psychiatric  o Admits  o : depression, difficulty sleeping  o Denies  o : anxiety  · Heme-Lymph  o Denies  o : lightheadedness, easy bleeding  · Allergic-Immunologic  o Denies  o : sinus allergy symptoms, allergic dermatitis      Vitals  Date Time BP Position Site L\R Cuff Size HR RR TEMP (F) WT  HT  BMI kg/m2 BSA m2 O2 Sat FR L/min  "FiO2 HC       06/04/2021 09:10 /60 Sitting    95 - R 14 97.8 139lbs 6oz 5'  7\" 21.83 1.73 97 %            Physical Examination  · Constitutional  o Appearance  o : well-nourished, in no acute distress  · Eyes  o Conjunctivae  o : conjunctivae normal  o Sclerae  o : sclerae white  o Pupils and Irises  o : pupils equal, round, and reactive to light and accommodation bilaterally  o Eyelids/Ocular Adnexae  o : eyelid appearance normal, no exudates present  · Ears, Nose, Mouth and Throat  o Ears  o :   § External Ears  § : external auditory canal appearance within normal limits, no discharge present  § Otoscopic Examination  § : tympanic membrane appearance within normal limits bilaterally  o Nose  o :   § External Nose  § : appearance normal  § Nasopharynx  § : no discharge present  o Oral Cavity  o :   § Oral Mucosa  § : oral mucosa normal  § Lips  § : lip appearance normal  o Throat  o :   § Oropharynx  § : no inflammation or lesions present, tonsils within normal limits  · Neck  o Inspection/Palpation  o : normal appearance, no masses or tenderness, trachea midline  o Range of Motion  o : cervical range of motion within normal limits  o Thyroid  o : gland size normal, nontender, no nodules or masses present on palpation  o Jugular Veins  o : JVP normal  · Respiratory  o Respiratory Effort  o : breathing unlabored  o Inspection of Chest  o : normal appearance  o Auscultation of Lungs  o : normal breath sounds throughout  · Cardiovascular  o Heart  o :   § Auscultation of Heart  § : regular rate and rhythm, no murmurs, gallops or rubs  o Peripheral Vascular System  o :   § Extremities  § : no edema  · Gastrointestinal  o Abdominal Examination  o : abdomen nontender to palpation, tone normal without rigidity or guarding, no masses present, bowel sounds present in all four quadrants  o Liver and spleen  o : no hepatomegaly present, liver nontender to palpation, spleen not palpable  o Hernias  o : no hernias " present  · Lymphatic  o Neck  o : no lymphadenopathy present  · Musculoskeletal  o Spine  o :   § Inspection/Palpation  § : no spinal tenderness, scoliosis or kyphosis present  § Stability  § : no subluxations present  § Range of Motion  § : spine range of motion normal  o Right Upper Extremity  o :   § Inspection/Palpation  § : no tenderness to palpation, ROM normal  o Left Upper Extremity  o :   § Inspection/Palpation  § : no tenderness to palpation, ROM normal  o Right Lower Extremity  o :   § Inspection/Palpation  § : no joint or limb tenderness to palpation, ROM normal  o Left Lower Extremity  o :   § Inspection/Palpation  § : no joint or limb tenderness to palpation, ROM normal  · Skin and Subcutaneous Tissue  o General Inspection  o : no rashes or lesions present, no areas of discoloration  o Body Hair  o : scalp palpation normal, hair normal for age, general body hair distribution normal for age  o Digits and Nails  o : no clubbing, cyanosis, deformities or edema present, normal appearing nails  · Neurologic  o Mental Status Examination  o :   § Orientation  § : grossly oriented to person, place and time  o Gait and Station  o : normal gait, able to stand without difficulty  · Psychiatric  o Judgement and Insight  o : judgment and insight intact  o Mood and Affect  o : mood normal, affect appropriate              Assessment  · Insomnia, unspecified     780.52/G47.00  · Bipolar depression     296.50/F31.9      Plan  · Orders  o ACO-39: Current medications updated and reviewed (, 1159F) - - 06/04/2021  · Medications  o Medications have been Reconciled  o Transition of Care or Provider Policy  · Instructions  o Patient was educated/instructed on their diagnosis, treatment and medications prior to discharge from the clinic today.  o Minutes spent with patient including greater than 50% in Education/Counseling/Care Coordination.  o Time spent with the patient was minutes, more than 50% face to face. 25  minutes  · Disposition  o f/u 1 month            Electronically Signed by: Sofie Ku MD -Author on June 14, 2021 06:25:25 AM

## 2021-06-05 NOTE — PROGRESS NOTES
Progress Note      Patient Name: Elly Barrett   Patient ID: [id number]   Sex: Female   YOB: 1980    Primary Care Provider: Sofie Ku MD   Referring Provider: Sofie Ku MD    Visit Date: May 14, 2021    Provider: Sofie Ku MD   Location: [facility]   Location Address: [address] [place] [address], [address] [place] [address]  [id number]   Location Phone: [phone]          Chief Complaint  · Possible UTI      History Of Present Illness  Elly Barrett is a 40 year old /White female who presents for evaluation and treatment of:      Pt reports low back pain and frequent urination for the last few days.  Pt is continuing to eat well and feels she is getting better since she is filing for disability.  Her daughter is not interacting with her very much and it really upset her that she did not want to spend mother's day with her but she is trying to be understanding at this time.  Pt would like to be able to take something for anxiety so we will start Buspirone to help manage her anxiety which she says she gets often when around her daughter.       Past Medical History  Abdominal pain; Allergies; Allergy, Unspecified; Anxiety; Asthma; Attention Deficit Hyperactivity Disorder; Bipolar Disorder; Chronic idiopathic constipation; Constipation; Depression; Depression (emotion); Epigastric pain; Frequent headaches; Hallucinations; Hemorrhoids, Unspecified, without Complications; Insomnia; Major depressive disorder, recurrent, moderate; Microscopic hematuria; Migraines; Nausea and vomiting; Reflux; Seasonal allergies; Suicidal thoughts; Tobacco abuse         Past Surgical History  Breast augmentation; Cesarian Section; EGD         Medication List  aripiprazole 5 mg oral tablet; lamotrigine 200 mg oral tablet; ondansetron 8 mg oral tablet,disintegrating; Restoril 30 mg oral capsule; venlafaxine 37.5 mg oral capsule,extended release 24hr; venlafaxine 75 mg oral  "capsule,extended release 24hr         Allergy List  aspirin; Codiene; Egg; Keflex; NSAIDS; PENICILLINS         Family Medical History  Heart Disease; CVA (Cerebrovascular accident); Diabetes, unspecified type; Osteoporosis; No family history of colorectal cancer; Family history of Arthritis         Reproductive History   0 Para 0 0 0 0       Social History  Alcohol (Never); Alcohol Use (Current - status unknown); Claustophobic (Unknown); Homemaker.; lives with children; lives with spouse; ; .; Recreational Drug Use (Never); Tobacco (Current every day); Unemployed.         Immunizations  Name Date Admin   Influenza Patient had disease   Influenza Patient had disease         Review of Systems  · Constitutional  o Denies  o : fatigue, night sweats  · Eyes  o Denies  o : double vision, blurred vision  · HENT  o Denies  o : vertigo, recent head injury  · Breasts  o Denies  o : abnormal changes in breast size, additional breast symptoms except as noted in the HPI  · Cardiovascular  o Denies  o : chest pain, irregular heart beats  · Respiratory  o Denies  o : shortness of breath, productive cough  · Gastrointestinal  o Denies  o : nausea, vomiting  · Genitourinary  o Admits  o : frequency  o Denies  o : dysuria, urinary retention  · Integument  o Denies  o : hair growth change, new skin lesions  · Neurologic  o Denies  o : altered mental status, seizures  · Musculoskeletal  o Admits  o : back pain  o Denies  o : joint swelling, limitation of motion  · Endocrine  o Denies  o : cold intolerance, heat intolerance  · Psychiatric  o Admits  o : anxiety, depression  · Heme-Lymph  o Denies  o : petechiae, lymph node enlargement or tenderness  · Allergic-Immunologic  o Denies  o : frequent illnesses      Vitals  Date Time BP Position Site L\R Cuff Size HR RR TEMP (F) WT  HT  BMI kg/m2 BSA m2 O2 Sat FR L/min FiO2        2021 04:52 /68 Sitting    89 - R  98.1 137lbs 8oz 5'  7\" 21.54 1.72 94 %    "         Physical Examination  · Constitutional  o Appearance  o : well-nourished, in no acute distress  · Eyes  o Conjunctivae  o : conjunctivae normal  o Sclerae  o : sclerae white  o Pupils and Irises  o : pupils equal, round, and reactive to light and accommodation bilaterally  o Eyelids/Ocular Adnexae  o : eyelid appearance normal, no exudates present  · Ears, Nose, Mouth and Throat  o Ears  o :   § External Ears  § : external auditory canal appearance within normal limits, no discharge present  § Otoscopic Examination  § : tympanic membrane appearance within normal limits bilaterally  o Nose  o :   § External Nose  § : appearance normal  § Nasopharynx  § : no discharge present  o Oral Cavity  o :   § Oral Mucosa  § : oral mucosa normal  § Lips  § : lip appearance normal  o Throat  o :   § Oropharynx  § : no inflammation or lesions present, tonsils within normal limits  · Neck  o Inspection/Palpation  o : normal appearance, no masses or tenderness, trachea midline  o Range of Motion  o : cervical range of motion within normal limits  o Thyroid  o : gland size normal, nontender, no nodules or masses present on palpation  o Jugular Veins  o : JVP normal  · Respiratory  o Respiratory Effort  o : breathing unlabored  o Inspection of Chest  o : normal appearance  o Auscultation of Lungs  o : normal breath sounds throughout  · Cardiovascular  o Heart  o :   § Auscultation of Heart  § : regular rate and rhythm, no murmurs, gallops or rubs  o Peripheral Vascular System  o :   § Extremities  § : no edema  · Gastrointestinal  o Abdominal Examination  o : abdomen nontender to palpation, tone normal without rigidity or guarding, no masses present, bowel sounds present in all four quadrants  o Liver and spleen  o : no hepatomegaly present, liver nontender to palpation, spleen not palpable  o Hernias  o : no hernias present  · Lymphatic  o Neck  o : no lymphadenopathy present  · Musculoskeletal  o Spine  o :    § Inspection/Palpation  § : no spinal tenderness, scoliosis or kyphosis present  § Stability  § : no subluxations present  § Range of Motion  § : spine range of motion normal  o Right Upper Extremity  o :   § Inspection/Palpation  § : no tenderness to palpation, ROM normal  o Left Upper Extremity  o :   § Inspection/Palpation  § : no tenderness to palpation, ROM normal  o Right Lower Extremity  o :   § Inspection/Palpation  § : no joint or limb tenderness to palpation, ROM normal  o Left Lower Extremity  o :   § Inspection/Palpation  § : no joint or limb tenderness to palpation, ROM normal  · Skin and Subcutaneous Tissue  o General Inspection  o : no rashes or lesions present, no areas of discoloration  o Body Hair  o : scalp palpation normal, hair normal for age, general body hair distribution normal for age  o Digits and Nails  o : no clubbing, cyanosis, deformities or edema present, normal appearing nails  · Neurologic  o Mental Status Examination  o :   § Orientation  § : grossly oriented to person, place and time  o Gait and Station  o : normal gait, able to stand without difficulty  · Psychiatric  o Judgement and Insight  o : judgment and insight intact  o Mood and Affect  o : mood normal, affect appropriate          Results  · In-Office Procedures  o Lab procedure  § IOP - Urine Drug Screen In-House [facility] (557[address])   § Amphetamines Ur Ql: Negative   § Barbiturates Ur Ql: Negative   § Buprenorphine+Nor Ur Ql Scn: Negative   § Benzodiaz Ur Ql: Positive   § Cocaine Ur Ql: Negative   § Methadone Ur Ql: Negative   § Methamphet Ur Ql: Negative   § MDMA Ur Ql Scn: Negative   § Opiates Ur Ql: Negative   § Oxycodone Ur Ql: Negative   § PCP Ur Ql: Negative   § THC Ur Ql: Negative   § Temp in Range?: Within/Acceptable   § Control Seen?: Yes   § IOP - Urinalysis without Microscopy (Clinitek) [facility] ([id number])   § Color Ur: Dark yellow   § Clarity Ur: Clear   § Glucose Ur Ql Strip: Negative   § Bilirub Ur Ql Strip: Small    § Ketones Ur Ql Strip: Trace   § Sp Gr Ur Qn: 1.025   § Hgb Ur Ql Strip: Moderate   § pH Ur-LsCnc: 7.0   § Prot Ur Ql Strip: Trace   § Urobilinogen Ur Strip-mCnc: 1.0 E.U./dL   § Nitrite Ur Ql Strip: Negative   § WBC Est Ur Ql Strip: Negative       Assessment  · Anxiety disorder     300.00/F41.9  · Urinary tract infection     599.0/N39.0  · Hematuria     599.70/R31.9      Plan  · Orders  o Urine culture (33314, 69662) - 599.0/N39.0, 599.70/R31.9 - 05/14/2021  o ACO-39: Current medications updated and reviewed (1159F, ) - - 05/14/2021  · Medications  o buspirone 10 mg oral tablet   SIG: take 1 tablet (10 mg) by oral route 2 times per day for 30 days   DISP: (60) Tablet with 0 refills  Prescribed on 05/14/2021     o Medications have been Reconciled  o Transition of Care or Provider Policy  · Instructions  o Patient was educated/instructed on their diagnosis, treatment and medications prior to discharge from the clinic today.  o Minutes spent with patient including greater than 50% in Education/Counseling/Care Coordination.  o Time spent with the patient was minutes, more than 50% face to face. 25 minutes  · Disposition  o Return Visit Request in/on 1 month +/- 0 days ([id number]).            Electronically Signed by: Sofie Ku MD -Author on June 4, 2021 07:24:06 AM

## 2021-06-05 NOTE — PROGRESS NOTES
"   Progress Note      Patient Name: Elly Barrett   Patient ID: [id number]   Sex: Female   YOB: 1980    Primary Care Provider: Sofie Ku MD   Referring Provider: Sofie Ku MD    Visit Date: May 13, 2021    Provider: Stanley Quinones MD   Location: [facility]   Location Address: [address] [place] [address]  [id number]   Location Phone: [phone]          Chief Complaint     \"I am having anxiety and I have not slept in a week.\"       History Of Present Illness  Elly Barrett is a 40 year old /White female who presents to the office today referred by Sofie Ku MD.   Chart review 12/15: Seen November 19th. Patient has a history of bipolar disorder. Lost her insurance, so they are trying to find medications that are covered on the $4 medication list. Discontinued Depakote because it made her \"shaky.\" She is on lamotrigine 400 mg p.o. daily, quetiapine 200 nightly, Restoril 30 mg nightly. No longer on diazepam 5 mg 3 times daily. June labs: LFTs within normal limits electrolytes within normal limits creatinine is 0.98. EKG 2018 shows heart rate 72, , sinus. MRI 2017 without contrast for headache shows no acute, with a few small foci of nonspecific gliosis which could be related to migraine disorder or early minimal chronic small vessel ischemic change.   Virtual visit via Zoom audio and video due to the COVID-19 pandemic. Patient is accepting of and agreeable to appointment. The appointment consisted of the patient and I only. Interview: Patient reports that she has been taking temazepam and olanzapine in the evenings. She was taking temazepam inpatient. She has run out of temazepam and is now not sleeping. She was taking temazepam inpatient. Denies taking Lunesta presently (she had been prescribed it before, and I wanted to ensure there were no further sources of confusion). She has not slept for a week. We went over her " medications; I informed her that she should not be on olanzapine, as we never restarted this after her discharge.   Patient reports she is still doing well. Has some anxiety. Again, states that she knows that if she has suicidal thoughts that she can immediately go back to the adult crisis unit at [facility].   ...   ...       Past Medical History  Disease Name Date Onset Notes   Abdominal pain --  --    Allergies --  --    Allergy, Unspecified --  allergies   Anxiety --  --    Asthma --  --    Attention Deficit Hyperactivity Disorder --  --    Bipolar Disorder --  psychiatric care   Chronic idiopathic constipation --  --    Constipation --  --    Depression --  --    Depression (emotion) --  --    Epigastric pain --  --    Frequent headaches --  --    Hallucinations --  --    Hemorrhoids, Unspecified, without Complications --  --    Insomnia --  --    Major depressive disorder, recurrent, moderate 04/22/2021 --    Microscopic hematuria 09/10/2018 --    Migraines --  --    Nausea and vomiting --  --    Reflux --  --    Seasonal allergies --  --    Suicidal thoughts --  --    Tobacco abuse --  --          Past Surgical History  Procedure Name Date Notes   Breast augmentation --  --    Cesarian Section 2003 --    EGD 2018 [facility]         Medication List  Name Date Started Instructions   aripiprazole 5 mg oral tablet 04/28/2021 take 1 tablet (5 mg) by oral route once daily for 60 days   lamotrigine 200 mg oral tablet 11/19/2020 take 2 tablets (400 mg) by oral route once daily for 30 days   ondansetron 8 mg oral tablet,disintegrating 03/26/2021 dissolve 1 tablet by oral route 3 times a day as needed for 10 days   Restoril 30 mg oral capsule  take 1 capsule (30 mg) by oral route once daily at bedtime as needed   venlafaxine 37.5 mg oral capsule,extended release 24hr 04/28/2021 take 1 capsule (37.5 mg) by oral route once daily with food for 60 days   venlafaxine 75 mg oral capsule,extended release 24hr 04/14/2021 take 1  capsule (75 mg) by oral route once daily with food for 30 days   Zyprexa 10 mg oral tablet  --          Allergy List  Allergen Name Date Reaction Notes   aspirin --  --  --    Codiene --  --  --    Egg --  --  --    Keflex --  --  Blisters on face   NSAIDS --  --  --    PENICILLINS --  --  --          Family Medical History  Disease Name Relative/Age Notes   Heart Disease Father/   Father  Mother   CVA (Cerebrovascular accident) Father/   --    Diabetes, unspecified type Mother/   Mother   Osteoporosis Father/  Mother/   Mother; Father   No family history of colorectal cancer  --    Family history of Arthritis Father/   Father         Reproductive History  Menstrual   Last Menstrual Period: 2015   Pregnancy Summary   Total Pregnancies: 0 Full Term: 0 Premature: 0   Ab Induced: 0 Ab Spontaneous: 0 Ectopics: 0   Multiples: 0 Livin         Social History  Finding Status Start/Stop Quantity Notes   Alcohol Never --/-- --  2021 - 2021 - 12/15/2020 -    Alcohol Use Current - status unknown --/-- --  less than 1 drink per day   Claustophobic Unknown --/-- --  yes   Homemaker. --  --/-- --  --    lives with children --  --/-- --  --    lives with spouse --  --/-- --  --     --  --/-- --  --    . --  --/-- --  --    Recreational Drug Use Never --/-- --  2021 - 2021 - 12/15/2020 - no   Tobacco Current every day --/-- 1.5 PPD current every day smoker, 1.5 packs per day, smoked 11-20 years  current every day smoker, 1 packs per day, smoked 11-20 years   Unemployed. --  --/-- --  --          Immunizations  NameDate Admin Mfg Trade Name Lot Number Route Inj VIS Given VIS Publication   InfluenzaHad Disease  NE Not Entered  NE NE     Comments: declined         Review of Systems  · Constitutional  o Admits  o : fatigue, night sweats  · Eyes  o Denies  o : double vision, blurred vision  · HENT  o Denies  o : vertigo, recent head injury  · Breasts  o Denies  o : abnormal changes in  "breast size, additional breast symptoms except as noted in the HPI  · Cardiovascular  o Admits  o : chest pain, irregular heart beats  · Respiratory  o Admits  o : shortness of breath, productive cough  · Gastrointestinal  o Denies  o : nausea, vomiting  · Genitourinary  o Denies  o : dysuria, urinary retention  · Integument  o Denies  o : hair growth change, new skin lesions  · Neurologic  o Denies  o : altered mental status, seizures  · Musculoskeletal  o Denies  o : joint swelling, limitation of motion  · Endocrine  o Denies  o : cold intolerance, heat intolerance  · Heme-Lymph  o Denies  o : petechiae, lymph node enlargement or tenderness  · Allergic-Immunologic  o Denies  o : frequent illnesses      Physical Examination  · Mental Status Exam  o Appearance  o : good eye contact, normal street clothes, groomed, outside  o Behavior  o : pleasant and cooperative  o Motor  o : No abnormal  o Speech  o : Normal rhythm, rate, volume, tone, not hyperverbal, not pressured, normal prosidy  o Mood  o : \"Doing well, but some anxiety\"  o Affect  o : mood congruent, fair variability  o Thought Content  o : negative suicidal ideations, negative homicidal ideations, negative obsessions  o Perceptions  o : negative auditory hallucinations, negative visual hallucinations  o Thought Process  o : linear  o Insight/Judgement  o : fair/fair  o Cognition  o : grossly intact  o Attention  o : intact          Assessment  · Anxiety     300.02/F41.1  · Depression     296.31  · Tobacco abuse     305.1/Z72.0       Presentation most consistent with history of depression with psychotic features, though psychotic features have now resolved.  Current episode is depressed.  Generalized anxiety disorder, as complicated bereavement. Consider cluster B, histrionic, narcissistic, borderline.    Patient is status post [facility] at the adult crisis stabilization unit.      Some anxiety, insomnia, but still doing well. " Discontinue olanzapine. Refill temazepam to target insomnia. She is not on lunesta.    Denies SI.  Future oriented and that she is applying for Social Security disability, VA benefits, and is also planning on volunteering at [employer] [facility].    Consider low dose gabapentin 100 mg tid for anxiety. Consider Seroquel.    Follow-up in 4 wks.    Continue psychotherapy with [facility].       Plan  · Orders  o ACO-39: Current medications updated and reviewed (1159F, ) - - 05/13/2021  · Medications  o Restoril 30 mg oral capsule   SIG: take 1 capsule (30 mg) by oral route once daily at bedtime as needed for 30 days   DISP: (30) Capsule with 3 refills  Prescribed on 05/13/2021     o Zyprexa 10 mg oral tablet   SIG: ---   DISP: (0) Tablet with 0 refills  Discontinued on 05/13/2021     o Medications have been Reconciled  o Transition of Care or Provider Policy  · Instructions  o Risk Assessment: Risk of self-harm acutely is high, not imminent. Risk factors include severe recent psychosocial stressor, mood disorder, access to weapons, history of self-harm and suicide attempt, recent self-harm, recent admission to inpatient for SI. Protective factors include no family history, no present SI, minimal AODA, healthcare seeking, future orientation, willingness to engage in care, support from her daughter. Risk of self-harm chronically is also high, but could be further elevated in the event of treatment noncompliance and/or AODA.  o Chronic Risk:   o Safety: No acute safety concerns.  o Medications: CONTINUE melatonin 3 mg PO QHS. CONTINUE lamotrigine 400 mg p.o. daily. CONTINUE aripiprazole 5 mg PO QDAY. INCREASE venlafaxine 75 to 112.5 mg PO QDAY. DISCONTINUE olanzapine. RE-START temazepam 30 mg PO QHS. *** S/P trazodone for insomnia (no effect), mirtazapine (no effect), quetiapine (didn't take), eszopiclone (no effect), olanzapine.  o Therapy: Continue with [facility].  o Follow Up: 4 wks. Patient will call me in a  week.  · Disposition  o Follow Up in 1 month.            Electronically Signed by: Stanley Quinones MD -Author on May 13, 2021 03:29:29 PM

## 2021-06-07 RX ORDER — BUSPIRONE HYDROCHLORIDE 10 MG/1
TABLET ORAL
Qty: 60 TABLET | Refills: 0 | OUTPATIENT
Start: 2021-06-07

## 2021-06-11 PROBLEM — R10.13 EPIGASTRIC PAIN: Status: ACTIVE | Noted: 2021-06-11

## 2021-06-11 PROBLEM — G43.909 MIGRAINES: Status: ACTIVE | Noted: 2021-06-11

## 2021-06-11 PROBLEM — R10.9 ABDOMINAL PAIN: Status: ACTIVE | Noted: 2021-06-11

## 2021-06-11 PROBLEM — R51.9 FREQUENT HEADACHES: Status: ACTIVE | Noted: 2021-06-11

## 2021-06-11 PROBLEM — G47.00 INSOMNIA: Status: ACTIVE | Noted: 2021-06-11

## 2021-06-11 PROBLEM — F31.9 BIPOLAR DISORDER (HCC): Status: ACTIVE | Noted: 2021-06-11

## 2021-06-11 PROBLEM — R31.29 MICROSCOPIC HEMATURIA: Status: ACTIVE | Noted: 2018-09-10

## 2021-06-11 PROBLEM — R11.2 NAUSEA AND VOMITING: Status: ACTIVE | Noted: 2021-06-11

## 2021-06-11 PROBLEM — F33.1 MAJOR DEPRESSIVE DISORDER, RECURRENT, MODERATE: Status: ACTIVE | Noted: 2021-04-22

## 2021-06-11 PROBLEM — K59.04 CHRONIC IDIOPATHIC CONSTIPATION: Status: ACTIVE | Noted: 2021-06-11

## 2021-06-11 PROBLEM — J45.909 ASTHMA: Status: ACTIVE | Noted: 2021-06-11

## 2021-06-11 PROBLEM — R45.851 SUICIDAL THOUGHTS: Status: ACTIVE | Noted: 2021-06-11

## 2021-06-11 PROBLEM — K59.00 CONSTIPATION: Status: ACTIVE | Noted: 2021-06-11

## 2021-06-11 PROBLEM — F90.9 ATTENTION DEFICIT HYPERACTIVITY DISORDER: Status: ACTIVE | Noted: 2021-06-11

## 2021-06-11 PROBLEM — J30.2 SEASONAL ALLERGIC RHINITIS: Status: ACTIVE | Noted: 2021-06-11

## 2021-06-11 PROBLEM — F41.9 ANXIETY: Status: ACTIVE | Noted: 2021-06-11

## 2021-06-11 PROBLEM — R44.3 HALLUCINATIONS: Status: ACTIVE | Noted: 2021-06-11

## 2021-06-11 PROBLEM — Z72.0 TOBACCO ABUSE: Status: ACTIVE | Noted: 2021-06-11

## 2021-06-28 ENCOUNTER — HOSPITAL ENCOUNTER (EMERGENCY)
Facility: HOSPITAL | Age: 41
Discharge: HOME | End: 2021-06-28
Attending: EMERGENCY MEDICINE | Admitting: EMERGENCY MEDICINE

## 2021-06-28 ENCOUNTER — APPOINTMENT (OUTPATIENT)
Dept: CT IMAGING | Facility: HOSPITAL | Age: 41
End: 2021-06-28

## 2021-06-28 VITALS
SYSTOLIC BLOOD PRESSURE: 99 MMHG | OXYGEN SATURATION: 95 % | WEIGHT: 129.41 LBS | HEART RATE: 71 BPM | BODY MASS INDEX: 20.31 KG/M2 | RESPIRATION RATE: 14 BRPM | DIASTOLIC BLOOD PRESSURE: 85 MMHG | HEIGHT: 67 IN | TEMPERATURE: 98.2 F

## 2021-06-28 DIAGNOSIS — N20.0 KIDNEY STONE: Primary | ICD-10-CM

## 2021-06-28 LAB
ALBUMIN SERPL-MCNC: 4.7 G/DL (ref 3.5–5.2)
ALBUMIN/GLOB SERPL: 1.6 G/DL
ALP SERPL-CCNC: 107 U/L (ref 39–117)
ALT SERPL W P-5'-P-CCNC: 7 U/L (ref 1–33)
ANION GAP SERPL CALCULATED.3IONS-SCNC: 11.9 MMOL/L (ref 5–15)
AST SERPL-CCNC: 13 U/L (ref 1–32)
BACTERIA UR QL AUTO: ABNORMAL /HPF
BASOPHILS # BLD AUTO: 0.03 10*3/MM3 (ref 0–0.2)
BASOPHILS NFR BLD AUTO: 0.3 % (ref 0–1.5)
BILIRUB SERPL-MCNC: 0.4 MG/DL (ref 0–1.2)
BILIRUB UR QL STRIP: ABNORMAL
BUN SERPL-MCNC: 14 MG/DL (ref 6–20)
BUN/CREAT SERPL: 12.2 (ref 7–25)
CALCIUM SPEC-SCNC: 10 MG/DL (ref 8.6–10.5)
CHLORIDE SERPL-SCNC: 104 MMOL/L (ref 98–107)
CLARITY UR: ABNORMAL
CO2 SERPL-SCNC: 23.1 MMOL/L (ref 22–29)
COLOR UR: ABNORMAL
CREAT SERPL-MCNC: 1.15 MG/DL (ref 0.57–1)
DEPRECATED RDW RBC AUTO: 51.1 FL (ref 37–54)
EOSINOPHIL # BLD AUTO: 0.02 10*3/MM3 (ref 0–0.4)
EOSINOPHIL NFR BLD AUTO: 0.2 % (ref 0.3–6.2)
ERYTHROCYTE [DISTWIDTH] IN BLOOD BY AUTOMATED COUNT: 14 % (ref 12.3–15.4)
GFR SERPL CREATININE-BSD FRML MDRD: 52 ML/MIN/1.73
GLOBULIN UR ELPH-MCNC: 2.9 GM/DL
GLUCOSE SERPL-MCNC: 104 MG/DL (ref 65–99)
GLUCOSE UR STRIP-MCNC: NEGATIVE MG/DL
HCG INTACT+B SERPL-ACNC: <0.5 MIU/ML
HCT VFR BLD AUTO: 44.2 % (ref 34–46.6)
HGB BLD-MCNC: 15 G/DL (ref 12–15.9)
HGB UR QL STRIP.AUTO: ABNORMAL
HOLD SPECIMEN: NORMAL
HOLD SPECIMEN: NORMAL
HYALINE CASTS UR QL AUTO: ABNORMAL /LPF
IMM GRANULOCYTES # BLD AUTO: 0.06 10*3/MM3 (ref 0–0.05)
IMM GRANULOCYTES NFR BLD AUTO: 0.5 % (ref 0–0.5)
KETONES UR QL STRIP: ABNORMAL
LEUKOCYTE ESTERASE UR QL STRIP.AUTO: ABNORMAL
LIPASE SERPL-CCNC: 50 U/L (ref 13–60)
LYMPHOCYTES # BLD AUTO: 1.05 10*3/MM3 (ref 0.7–3.1)
LYMPHOCYTES NFR BLD AUTO: 9 % (ref 19.6–45.3)
MCH RBC QN AUTO: 33.6 PG (ref 26.6–33)
MCHC RBC AUTO-ENTMCNC: 33.9 G/DL (ref 31.5–35.7)
MCV RBC AUTO: 98.9 FL (ref 79–97)
MONOCYTES # BLD AUTO: 0.48 10*3/MM3 (ref 0.1–0.9)
MONOCYTES NFR BLD AUTO: 4.1 % (ref 5–12)
MUCOUS THREADS URNS QL MICRO: ABNORMAL /HPF
NEUTROPHILS NFR BLD AUTO: 10.04 10*3/MM3 (ref 1.7–7)
NEUTROPHILS NFR BLD AUTO: 85.9 % (ref 42.7–76)
NITRITE UR QL STRIP: NEGATIVE
NRBC BLD AUTO-RTO: 0 /100 WBC (ref 0–0.2)
PH UR STRIP.AUTO: 5.5 [PH] (ref 5–8)
PLATELET # BLD AUTO: 253 10*3/MM3 (ref 140–450)
PMV BLD AUTO: 10.4 FL (ref 6–12)
POTASSIUM SERPL-SCNC: 4.4 MMOL/L (ref 3.5–5.2)
PROT SERPL-MCNC: 7.6 G/DL (ref 6–8.5)
PROT UR QL STRIP: ABNORMAL
RBC # BLD AUTO: 4.47 10*6/MM3 (ref 3.77–5.28)
RBC # UR: ABNORMAL /HPF
REF LAB TEST METHOD: ABNORMAL
SODIUM SERPL-SCNC: 139 MMOL/L (ref 136–145)
SP GR UR STRIP: 1.02 (ref 1–1.03)
SQUAMOUS #/AREA URNS HPF: ABNORMAL /HPF
UROBILINOGEN UR QL STRIP: ABNORMAL
WBC # BLD AUTO: 11.68 10*3/MM3 (ref 3.4–10.8)
WBC UR QL AUTO: ABNORMAL /HPF
WHOLE BLOOD HOLD SPECIMEN: NORMAL

## 2021-06-28 PROCEDURE — 96375 TX/PRO/DX INJ NEW DRUG ADDON: CPT

## 2021-06-28 PROCEDURE — 85025 COMPLETE CBC W/AUTO DIFF WBC: CPT

## 2021-06-28 PROCEDURE — 84702 CHORIONIC GONADOTROPIN TEST: CPT

## 2021-06-28 PROCEDURE — 0 IOPAMIDOL PER 1 ML: Performed by: EMERGENCY MEDICINE

## 2021-06-28 PROCEDURE — 74177 CT ABD & PELVIS W/CONTRAST: CPT

## 2021-06-28 PROCEDURE — 74177 CT ABD & PELVIS W/CONTRAST: CPT | Performed by: RADIOLOGY

## 2021-06-28 PROCEDURE — 81001 URINALYSIS AUTO W/SCOPE: CPT | Performed by: EMERGENCY MEDICINE

## 2021-06-28 PROCEDURE — 99283 EMERGENCY DEPT VISIT LOW MDM: CPT

## 2021-06-28 PROCEDURE — 80053 COMPREHEN METABOLIC PANEL: CPT

## 2021-06-28 PROCEDURE — 83690 ASSAY OF LIPASE: CPT

## 2021-06-28 PROCEDURE — 96374 THER/PROPH/DIAG INJ IV PUSH: CPT

## 2021-06-28 PROCEDURE — 25010000002 ONDANSETRON PER 1 MG: Performed by: EMERGENCY MEDICINE

## 2021-06-28 PROCEDURE — 36415 COLL VENOUS BLD VENIPUNCTURE: CPT

## 2021-06-28 RX ORDER — SODIUM CHLORIDE 0.9 % (FLUSH) 0.9 %
10 SYRINGE (ML) INJECTION AS NEEDED
Status: DISCONTINUED | OUTPATIENT
Start: 2021-06-28 | End: 2021-06-28 | Stop reason: HOSPADM

## 2021-06-28 RX ORDER — ONDANSETRON 2 MG/ML
4 INJECTION INTRAMUSCULAR; INTRAVENOUS ONCE
Status: COMPLETED | OUTPATIENT
Start: 2021-06-28 | End: 2021-06-28

## 2021-06-28 RX ORDER — FAMOTIDINE 10 MG/ML
20 INJECTION, SOLUTION INTRAVENOUS ONCE
Status: COMPLETED | OUTPATIENT
Start: 2021-06-28 | End: 2021-06-28

## 2021-06-28 RX ORDER — TAMSULOSIN HYDROCHLORIDE 0.4 MG/1
1 CAPSULE ORAL DAILY
Qty: 30 CAPSULE | Refills: 0 | Status: SHIPPED | OUTPATIENT
Start: 2021-06-28 | End: 2021-08-24

## 2021-06-28 RX ORDER — OXYCODONE HYDROCHLORIDE AND ACETAMINOPHEN 5; 325 MG/1; MG/1
1 TABLET ORAL EVERY 6 HOURS PRN
Qty: 12 TABLET | Refills: 0 | Status: SHIPPED | OUTPATIENT
Start: 2021-06-28 | End: 2021-08-09

## 2021-06-28 RX ADMIN — IOPAMIDOL 100 ML: 755 INJECTION, SOLUTION INTRAVENOUS at 15:11

## 2021-06-28 RX ADMIN — SODIUM CHLORIDE 1000 ML: 9 INJECTION, SOLUTION INTRAVENOUS at 14:57

## 2021-06-28 RX ADMIN — FAMOTIDINE 20 MG: 10 INJECTION INTRAVENOUS at 14:56

## 2021-06-28 RX ADMIN — ONDANSETRON 4 MG: 2 INJECTION INTRAMUSCULAR; INTRAVENOUS at 14:56

## 2021-07-01 RX ORDER — BUSPIRONE HYDROCHLORIDE 10 MG/1
TABLET ORAL
Qty: 60 TABLET | Refills: 2 | Status: SHIPPED | OUTPATIENT
Start: 2021-07-01 | End: 2021-10-20

## 2021-07-08 ENCOUNTER — OFFICE VISIT (OUTPATIENT)
Dept: FAMILY MEDICINE CLINIC | Facility: CLINIC | Age: 41
End: 2021-07-08

## 2021-07-08 VITALS
BODY MASS INDEX: 20.69 KG/M2 | OXYGEN SATURATION: 100 % | TEMPERATURE: 97.7 F | SYSTOLIC BLOOD PRESSURE: 130 MMHG | HEIGHT: 67 IN | WEIGHT: 131.8 LBS | HEART RATE: 92 BPM | DIASTOLIC BLOOD PRESSURE: 78 MMHG

## 2021-07-08 DIAGNOSIS — F41.9 ANXIETY: Primary | ICD-10-CM

## 2021-07-08 DIAGNOSIS — R10.84 GENERALIZED ABDOMINAL PAIN: ICD-10-CM

## 2021-07-08 PROCEDURE — 99214 OFFICE O/P EST MOD 30 MIN: CPT | Performed by: FAMILY MEDICINE

## 2021-07-09 ENCOUNTER — TELEMEDICINE (OUTPATIENT)
Dept: PSYCHIATRY | Facility: CLINIC | Age: 41
End: 2021-07-09

## 2021-07-09 DIAGNOSIS — Z63.4 BEREAVEMENT: ICD-10-CM

## 2021-07-09 DIAGNOSIS — F41.1 GENERALIZED ANXIETY DISORDER: ICD-10-CM

## 2021-07-09 DIAGNOSIS — F31.74 BIPOLAR 1 DISORDER, MANIC, FULL REMISSION (HCC): Primary | ICD-10-CM

## 2021-07-09 PROBLEM — K21.9 ACID REFLUX: Status: ACTIVE | Noted: 2021-07-09

## 2021-07-09 PROBLEM — F32.A DEPRESSION: Status: ACTIVE | Noted: 2021-07-09

## 2021-07-09 PROBLEM — J30.2 SEASONAL ALLERGIES: Status: ACTIVE | Noted: 2021-07-09

## 2021-07-09 PROCEDURE — 99214 OFFICE O/P EST MOD 30 MIN: CPT | Performed by: STUDENT IN AN ORGANIZED HEALTH CARE EDUCATION/TRAINING PROGRAM

## 2021-07-09 RX ORDER — NALOXONE HYDROCHLORIDE 4 MG/.1ML
SPRAY NASAL
COMMUNITY
Start: 2021-06-29 | End: 2021-08-09

## 2021-07-09 SDOH — SOCIAL STABILITY - SOCIAL INSECURITY: DISSAPEARANCE AND DEATH OF FAMILY MEMBER: Z63.4
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
## 2021-07-09 NOTE — PATIENT INSTRUCTIONS
1.  Please return to clinic at your next scheduled visit.  Contact the clinic ([phone]) at least 24 hours prior in the event you need to cancel.  2.  Do no harm to yourself or others.    3.  Avoid alcohol and drugs.    4.  Take all medications as prescribed.  Please contact the clinic with any concerns. If you are in need of medication refills, please call the clinic at [phone].    5. Should you want to get in touch with your provider, Dr. Stanley Quinones, please utilize [employer] or contact the office ([phone]), and staff will be able to page Dr. Quinones directly.  6.  In the event you have personal crisis, contact the following crisis numbers: Suicide Prevention Hotline 1-368.726.2972; [facility] Helpline 3-799-787-SZOY; [facility] Emergency Room [phone]; text HELLO to [phone]; or 469.     SPECIFIC RECOMMENDATIONS:     1.      Medications discussed at this encounter:                   -Discontinue aripiprazole.  Continue other medications.     2.      Psychotherapy recommendations: Continue     3.     Return to clinic: 4 weeks

## 2021-07-15 VITALS
HEART RATE: 89 BPM | BODY MASS INDEX: 21.58 KG/M2 | TEMPERATURE: 98.1 F | DIASTOLIC BLOOD PRESSURE: 68 MMHG | OXYGEN SATURATION: 94 % | SYSTOLIC BLOOD PRESSURE: 128 MMHG | WEIGHT: 137.5 LBS | HEIGHT: 67 IN

## 2021-07-15 VITALS
HEART RATE: 95 BPM | RESPIRATION RATE: 14 BRPM | SYSTOLIC BLOOD PRESSURE: 100 MMHG | OXYGEN SATURATION: 97 % | WEIGHT: 139.37 LBS | BODY MASS INDEX: 21.88 KG/M2 | TEMPERATURE: 97.8 F | DIASTOLIC BLOOD PRESSURE: 60 MMHG | HEIGHT: 67 IN

## 2021-07-21 ENCOUNTER — TELEPHONE (OUTPATIENT)
Dept: FAMILY MEDICINE CLINIC | Facility: CLINIC | Age: 41
End: 2021-07-21

## 2021-07-21 NOTE — TELEPHONE ENCOUNTER
Caller: Elly Barrett    Relationship: Self    Best call back number: [phone]    What is the best time to reach you: ANYTIME    Who are you requesting to speak with (clinical staff, provider,  specific staff member): DR. RICHARDSON    What was the call regarding: PATIENT DID NOT GO INTO DETAIL, STATED SHE WAS OUT OF TOWN AND NEEDING DR. RICHARDSON TO RETURN HER PHONE CALL AS SOON AS POSSIBLE.     Do you require a callback: YES

## 2021-07-22 NOTE — TELEPHONE ENCOUNTER
Caller: Elly Barrett    Relationship: Self    Best call back number: [phone]    What is the best time to reach you: ANY     Who are you requesting to speak with (clinical staff, provider,  specific staff member): DR. MING RICHARDSON      What was the call regarding: PATIENT STATES THAT SHE IS HAVING A ROUGH TIME RIGHT NOW. PATIENT IS IN [place] AND WILL BE COMING BACK HOME ON Monday, July 26. STATES THAT SHE HAS AN APPOINTMENT WITH PCP ON Tuesday, July 27 AT 2:00 PM. HOWEVER, PATIENT IS HAVING SOME HEALTH AND MENTAL ISSUES GOING ON RIGHT NOW AND IS REQUESTING TO SPEAKING TO WITH PCP ASAP.     Do you require a callback:YES    PLEASE ADVISE PATIENT. THANKS.

## 2021-07-22 NOTE — TELEPHONE ENCOUNTER
MADE CONTACT WITH PATIENT, LET HER KNOW THAT DR. RICHARDSON WOULD CALL HER BACK BY EOD AND IF SYMPTOMS WORSENED TO GO TO THE ER.

## 2021-07-30 ENCOUNTER — APPOINTMENT (OUTPATIENT)
Dept: CT IMAGING | Facility: HOSPITAL | Age: 41
End: 2021-07-30

## 2021-08-03 ENCOUNTER — TELEPHONE (OUTPATIENT)
Dept: FAMILY MEDICINE CLINIC | Facility: CLINIC | Age: 41
End: 2021-08-03

## 2021-08-03 NOTE — TELEPHONE ENCOUNTER
Caller: Elly Barrett    Relationship to patient: Self    Best call back number: [phone]    Patient is needing: PATIENT TESTED POSITIVE FOR COVID IN [place] WHILE SHE WAS OUT OF TOWN. PATIENT WOULD LIKE TO KNOW IF SHE CAN COME BACK HOME OR STAY IN [place]. SHE'S HAVING A HEADACHE, SHE'S CONGESTED AND HAS A LOSS OF TASTE AND SMELL. SHE'S HAD SYMPTOMS FOR ABOUT 3 DAYS. WOULD LIKE MEDICAL ADVICE FOR ANY MEDICINE OR ANYTHING SHE CAN DO TO HELP WITH THE SYMPTOMS.

## 2021-08-04 NOTE — TELEPHONE ENCOUNTER
Caller: Elly Barrett    Relationship: Self    Best call back number: [phone]     What medication are you requesting: SOMETHING TO HELP WITH COVID SYMPTOMS    What are your current symptoms: COUGH, CONGESTION, HEADACHE, EAR PAIN    If a prescription is needed, what is your preferred pharmacy and phone number: [facility] [address] [place] [address] - [address] [facility] [phone] [facility] [phone] FX     Additional notes: COVID POSITIVE 08/03/2021
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
## 2021-08-06 NOTE — PATIENT INSTRUCTIONS
1.  Please return to clinic at your next scheduled visit.  Contact the clinic ([phone]) at least 24 hours prior in the event you need to cancel.  2.  Do no harm to yourself or others.    3.  Avoid alcohol and drugs.    4.  Take all medications as prescribed.  Please contact the clinic with any concerns. If you are in need of medication refills, please call the clinic at [phone].    5. Should you want to get in touch with your provider, Dr. Stanley Quinones, please utilize [employer] or contact the office ([phone]), and staff will be able to page Dr. Quinones directly.  6.  In the event you have personal crisis, contact the following crisis numbers: Suicide Prevention Hotline 1-719.888.1374; [facility] Helpline 4-375-361-YSED; [facility] Emergency Room [phone]; text HELLO to 944364; or 409.     SPECIFIC RECOMMENDATIONS:     1.      Medications discussed at this encounter:                   -No changes     2.      Psychotherapy recommendations: Continue     3.     Return to clinic: 4 weeks

## 2021-08-06 NOTE — TELEPHONE ENCOUNTER
Called and spoke with patient.  Provider had advised patient to be seen at an Urgent Care in [place].  Per patient she is currently in [place] on her way back to [place] and due to her having medicaid she is unable to be seen outside of [facility].  Advised patient when she arrives back in [place] to go to urgent care to be seen for her symptoms.  Patient advised to keep her 8/23 apt scheduled with Dr. Ku for followup.  Patient verbally stated understanding and agreement

## 2021-08-06 NOTE — TELEPHONE ENCOUNTER
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

## 2021-08-09 ENCOUNTER — TELEMEDICINE (OUTPATIENT)
Dept: PSYCHIATRY | Facility: CLINIC | Age: 41
End: 2021-08-09

## 2021-08-09 DIAGNOSIS — Z63.4 BEREAVEMENT: ICD-10-CM

## 2021-08-09 DIAGNOSIS — F51.05 INSOMNIA DUE TO MENTAL CONDITION: ICD-10-CM

## 2021-08-09 DIAGNOSIS — F41.1 GENERALIZED ANXIETY DISORDER: ICD-10-CM

## 2021-08-09 DIAGNOSIS — F31.74 BIPOLAR 1 DISORDER, MANIC, FULL REMISSION (HCC): Primary | ICD-10-CM

## 2021-08-09 PROCEDURE — 99214 OFFICE O/P EST MOD 30 MIN: CPT | Performed by: STUDENT IN AN ORGANIZED HEALTH CARE EDUCATION/TRAINING PROGRAM

## 2021-08-09 SDOH — SOCIAL STABILITY - SOCIAL INSECURITY: DISSAPEARANCE AND DEATH OF FAMILY MEMBER: Z63.4

## 2021-08-12 ENCOUNTER — TELEPHONE (OUTPATIENT)
Dept: FAMILY MEDICINE CLINIC | Facility: CLINIC | Age: 41
End: 2021-08-12

## 2021-08-12 NOTE — TELEPHONE ENCOUNTER
Caller: Elly Barrett    Relationship: Self    Best call back number: [phone]    What is the best time to reach you: ANYTIME    Who are you requesting to speak with (clinical staff, provider,  specific staff member): MEDICAL STAFF    What was the call regarding: PATIENT TESTED POSITIVE FOR COVID OVER A WEEK AGO. SHE WOULD LIKE TO KNOW IF SHE NEEDS TO BE RETESTED SINCE SHE IS FEELING BETTER. ATTEMPTED TO WARM TRANSFER. PLEASE CALL PATIENT TO ADVISE.

## 2021-08-22 DIAGNOSIS — F31.74 BIPOLAR 1 DISORDER, MANIC, FULL REMISSION (HCC): Primary | ICD-10-CM

## 2021-08-23 DIAGNOSIS — F31.74 BIPOLAR 1 DISORDER, MANIC, FULL REMISSION (HCC): ICD-10-CM

## 2021-08-23 RX ORDER — QUETIAPINE FUMARATE 200 MG/1
200 TABLET, FILM COATED ORAL NIGHTLY
Qty: 60 TABLET | Refills: 1 | Status: SHIPPED | OUTPATIENT
Start: 2021-08-23 | End: 2021-12-15 | Stop reason: SDUPTHER

## 2021-08-23 RX ORDER — MONTELUKAST SODIUM 10 MG/1
10 TABLET ORAL NIGHTLY
Qty: 90 TABLET | Refills: 0 | Status: SHIPPED | OUTPATIENT
Start: 2021-08-23 | End: 2021-09-02 | Stop reason: SDUPTHER

## 2021-08-23 RX ORDER — QUETIAPINE FUMARATE 200 MG/1
200 TABLET, FILM COATED ORAL NIGHTLY
Qty: 60 TABLET | Refills: 1 | Status: CANCELLED | OUTPATIENT
Start: 2021-08-23 | End: 2021-12-21

## 2021-08-24 ENCOUNTER — OFFICE VISIT (OUTPATIENT)
Dept: FAMILY MEDICINE CLINIC | Facility: CLINIC | Age: 41
End: 2021-08-24

## 2021-08-24 VITALS
SYSTOLIC BLOOD PRESSURE: 110 MMHG | HEART RATE: 80 BPM | WEIGHT: 137.8 LBS | TEMPERATURE: 97.4 F | HEIGHT: 67 IN | BODY MASS INDEX: 21.63 KG/M2 | RESPIRATION RATE: 14 BRPM | OXYGEN SATURATION: 98 % | DIASTOLIC BLOOD PRESSURE: 60 MMHG

## 2021-08-24 DIAGNOSIS — F33.1 MODERATE EPISODE OF RECURRENT MAJOR DEPRESSIVE DISORDER (HCC): ICD-10-CM

## 2021-08-24 DIAGNOSIS — U07.1 COVID-19 VIRUS INFECTION: Primary | ICD-10-CM

## 2021-08-24 PROCEDURE — 99213 OFFICE O/P EST LOW 20 MIN: CPT | Performed by: FAMILY MEDICINE

## 2021-09-02 RX ORDER — MONTELUKAST SODIUM 10 MG/1
10 TABLET ORAL NIGHTLY
Qty: 90 TABLET | Refills: 0 | Status: SHIPPED | OUTPATIENT
Start: 2021-09-02 | End: 2022-03-30

## 2021-09-08 ENCOUNTER — TELEMEDICINE (OUTPATIENT)
Dept: PSYCHIATRY | Facility: CLINIC | Age: 41
End: 2021-09-08

## 2021-09-08 DIAGNOSIS — F51.05 INSOMNIA DUE TO MENTAL CONDITION: ICD-10-CM

## 2021-09-08 DIAGNOSIS — F41.1 GENERALIZED ANXIETY DISORDER: ICD-10-CM

## 2021-09-08 DIAGNOSIS — Z63.4 BEREAVEMENT: ICD-10-CM

## 2021-09-08 DIAGNOSIS — F31.74 BIPOLAR 1 DISORDER, MANIC, FULL REMISSION (HCC): Primary | ICD-10-CM

## 2021-09-08 PROCEDURE — 99214 OFFICE O/P EST MOD 30 MIN: CPT | Performed by: STUDENT IN AN ORGANIZED HEALTH CARE EDUCATION/TRAINING PROGRAM

## 2021-09-08 SDOH — SOCIAL STABILITY - SOCIAL INSECURITY: DISSAPEARANCE AND DEATH OF FAMILY MEMBER: Z63.4
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
## 2021-09-08 NOTE — PATIENT INSTRUCTIONS
1.  Please return to clinic at your next scheduled visit.  Contact the clinic ([phone]) at least 24 hours prior in the event you need to cancel.  2.  Do no harm to yourself or others.    3.  Avoid alcohol and drugs.    4.  Take all medications as prescribed.  Please contact the clinic with any concerns. If you are in need of medication refills, please call the clinic at [phone].    5. Should you want to get in touch with your provider, Dr. Stanley Quinones, please utilize [employer] or contact the office ([phone]), and staff will be able to page Dr. Quinones directly.  6.  In the event you have personal crisis, contact the following crisis numbers: Suicide Prevention Hotline 1-908.347.5997; [facility] Helpline 8-077-494-BPSC; [facility] Emergency Room [phone]; text HELLO to 036078; or 777.     SPECIFIC RECOMMENDATIONS:     1.      Medications discussed at this encounter:                   -      2.      Psychotherapy recommendations:      3.     Return to clinic: 4 weeks

## 2021-09-08 NOTE — PROGRESS NOTES
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 encounter.      Return in about 6 weeks (around 10/20/2021).         This document has been electronically signed by Stanley Quinones MD  September 8, 2021 10:41 EDT      Part of this note may be an electronic transcription/translation of spoken language to printed text using the Dragon Dictation System.

## 2021-09-21 RX ORDER — LINACLOTIDE 145 UG/1
CAPSULE, GELATIN COATED ORAL
Qty: 30 CAPSULE | Refills: 0 | Status: SHIPPED | OUTPATIENT
Start: 2021-09-21

## 2021-09-21 RX ORDER — HYDROXYZINE HYDROCHLORIDE 25 MG/1
TABLET, FILM COATED ORAL
Qty: 60 TABLET | Refills: 0 | Status: SHIPPED | OUTPATIENT
Start: 2021-09-21 | End: 2021-10-20 | Stop reason: SDUPTHER

## 2021-10-04 PROBLEM — U07.1 COVID-19 VIRUS INFECTION: Status: ACTIVE | Noted: 2021-10-04

## 2021-10-15 ENCOUNTER — OFFICE VISIT (OUTPATIENT)
Dept: FAMILY MEDICINE CLINIC | Facility: CLINIC | Age: 41
End: 2021-10-15

## 2021-10-15 VITALS
WEIGHT: 132.4 LBS | BODY MASS INDEX: 20.78 KG/M2 | SYSTOLIC BLOOD PRESSURE: 128 MMHG | HEART RATE: 70 BPM | HEIGHT: 67 IN | DIASTOLIC BLOOD PRESSURE: 76 MMHG | OXYGEN SATURATION: 96 % | TEMPERATURE: 98.2 F

## 2021-10-15 DIAGNOSIS — H66.005 RECURRENT ACUTE SUPPURATIVE OTITIS MEDIA WITHOUT SPONTANEOUS RUPTURE OF LEFT TYMPANIC MEMBRANE: ICD-10-CM

## 2021-10-15 DIAGNOSIS — Z12.31 BREAST CANCER SCREENING BY MAMMOGRAM: Primary | ICD-10-CM

## 2021-10-15 DIAGNOSIS — E78.5 HYPERLIPIDEMIA, UNSPECIFIED HYPERLIPIDEMIA TYPE: ICD-10-CM

## 2021-10-15 DIAGNOSIS — F33.1 MODERATE EPISODE OF RECURRENT MAJOR DEPRESSIVE DISORDER (HCC): ICD-10-CM

## 2021-10-15 DIAGNOSIS — Z30.013 ENCOUNTER FOR INITIAL PRESCRIPTION OF INJECTABLE CONTRACEPTIVE: ICD-10-CM

## 2021-10-15 PROCEDURE — 99214 OFFICE O/P EST MOD 30 MIN: CPT | Performed by: FAMILY MEDICINE

## 2021-10-15 PROCEDURE — 81025 URINE PREGNANCY TEST: CPT | Performed by: FAMILY MEDICINE

## 2021-10-15 RX ORDER — ARIPIPRAZOLE 5 MG/1
TABLET ORAL
COMMUNITY
Start: 2021-09-21 | End: 2021-10-20

## 2021-10-15 RX ORDER — MEDROXYPROGESTERONE ACETATE 150 MG/ML
150 INJECTION, SUSPENSION INTRAMUSCULAR ONCE
Status: DISCONTINUED | OUTPATIENT
Start: 2021-10-15 | End: 2021-10-15

## 2021-10-15 RX ORDER — OFLOXACIN 3 MG/ML
1 SOLUTION/ DROPS OPHTHALMIC 2 TIMES DAILY
Qty: 5 ML | Refills: 0 | Status: SHIPPED | OUTPATIENT
Start: 2021-10-15 | End: 2021-12-13

## 2021-10-15 RX ORDER — MEDROXYPROGESTERONE ACETATE 150 MG/ML
150 INJECTION, SUSPENSION INTRAMUSCULAR
Qty: 1 ML | Refills: 0 | Status: SHIPPED | OUTPATIENT
Start: 2021-10-15 | End: 2022-01-18 | Stop reason: SDUPTHER

## 2021-10-15 RX ORDER — TRAZODONE HYDROCHLORIDE 100 MG/1
TABLET ORAL
COMMUNITY
Start: 2021-09-21 | End: 2021-10-15

## 2021-10-15 NOTE — PROGRESS NOTES
Chief Complaint  Chief Complaint   Patient presents with   • Follow-up   • Contraception   • Med Refill       HPI:  Elly Barrett presents to [facility]    Pt reports she is currently dating and would like to be on birth control and was on the depo provera shot in the past.     Pt has been feeling much better and in a positive mood since now starting a new relationship.  Pt and her daughter are still estranged at this time.  Pt reports this is the 1 year anniversary to her  Olayinka dying last year.     Pt is still following up with Dr. Quinones for her Psychiatrist.     Ear Pain- pt reports she has been having discomfort in her left ear. On exam she has cloudy white fluid behind her left TM.  Will be treating with ear drops since she reports she was previously treated with antibiotics while on birth control and got pregnant.         Medical History:  Past History:  Medical History: has a past medical history of Abdominal pain, Acid reflux, ADHD, Allergies, Allergy, Anxiety, Asthma, Bipolar disorder (HCC), Borderline personality disorder (HCC), Chronic idiopathic constipation, Constipation, Depression, Epigastric pain, Frequent headaches, Hallucinations, Hemorrhoids, Insomnia, Major depressive disorder, Microscopic hematuria, Migraines, Nausea and vomiting, Panic disorder, Psychiatric illness, PTSD (post-traumatic stress disorder), Seasonal allergies, Suicidal thoughts, and Tobacco use.   Surgical History: has a past surgical history that includes Breast Augmentation;  section (); and Esophagogastroduodenoscopy (2018).   Family History: family history includes Alcohol abuse in her father; Anxiety disorder in her maternal grandmother; Arthritis in her father and another family member; Bipolar disorder in her maternal grandmother; Depression in her maternal grandmother; Diabetes in her mother; Drug abuse in her father; Heart disease in her father and mother; Osteoporosis  in her father and mother; Stroke in her father.   Social History: reports that she has been smoking cigarettes. She has a 25.00 pack-year smoking history. She has never used smokeless tobacco. She reports that she does not drink alcohol and does not use drugs.  Immunization History   Administered Date(s) Administered   • COVID-19 (MODERNA) 04/14/2021, 05/13/2021   • Tdap 03/04/2014         Allergies: Aspirin, Cephalexin, Codeine, Egg phospholipids, Nsaids, and Penicillins     Medications:  Current Outpatient Medications on File Prior to Visit   Medication Sig Dispense Refill   • ARIPiprazole (ABILIFY) 5 MG tablet      • busPIRone (BUSPAR) 10 MG tablet Take 1 tablet by mouth twice daily 60 tablet 2   • hydrOXYzine (ATARAX) 25 MG tablet Take 1 tablet by mouth three times daily as needed 60 tablet 0   • lamoTRIgine (LaMICtal) 200 MG tablet Take 400 mg by mouth every night at bedtime.     • levalbuterol (XOPENEX) 1.25 MG/3ML nebulizer solution USE 1 VIAL IN NEBULIZER THREE TIMES DAILY     • Linzess 145 MCG capsule capsule TAKE 1 CAPSULE BY MOUTH ONCE DAILY ON AN EMPTY STOMACH AT LEAST 30 MINUTES BEFORE THE FIRST MEAL OF THE DAY FOR 90 DAYS 30 capsule 0   • montelukast (Singulair) 10 MG tablet Take 1 tablet by mouth Every Night. 90 tablet 0   • ondansetron ODT (ZOFRAN-ODT) 8 MG disintegrating tablet DISSOLVE 1 TABLET IN MOUTH THREE TIMES DAILY AS NEEDED FOR NAUSEA     • QUEtiapine (SEROquel) 200 MG tablet Take 1 tablet by mouth Every Night for 120 days. 60 tablet 1   • temazepam (RESTORIL) 30 MG capsule Take 30 mg by mouth At Night As Needed.     • venlafaxine XR (EFFEXOR-XR) 37.5 MG 24 hr capsule Take 37.5 mg by mouth Daily. with food       No current facility-administered medications on file prior to visit.        Health Maintenance Due   Topic Date Due   • ANNUAL PHYSICAL  Never done   • Pneumococcal Vaccine 0-64 (1 of 2 - PPSV23) Never done   • HEPATITIS C SCREENING  Never done   • PAP SMEAR  Never done   • INFLUENZA  "VACCINE  Never done       Vital Signs:   Vitals:    10/15/21 1710   BP: 128/76   Pulse: 70   Temp: 98.2 °F (36.8 °C)   SpO2: 96%   Weight: 60.1 kg (132 lb 6.4 oz)   Height: 170.2 cm (67\")      Body mass index is 20.74 kg/m².     ROS:  Review of Systems   Constitutional: Negative for fatigue and fever.   HENT: Negative for congestion, ear pain and sinus pressure.    Respiratory: Negative for cough, chest tightness and shortness of breath.    Cardiovascular: Negative for chest pain, palpitations and leg swelling.   Gastrointestinal: Negative for abdominal pain and diarrhea.   Genitourinary: Negative for dysuria and frequency.   Neurological: Negative for speech difficulty, headache and confusion.   Psychiatric/Behavioral: Negative for agitation and behavioral problems.          Physical Exam  Constitutional:       Appearance: Normal appearance.   HENT:      Right Ear: Tympanic membrane normal.      Left Ear: Tympanic membrane normal.      Nose: Nose normal.   Eyes:      Extraocular Movements: Extraocular movements intact.      Conjunctiva/sclera: Conjunctivae normal.      Pupils: Pupils are equal, round, and reactive to light.   Cardiovascular:      Rate and Rhythm: Normal rate and regular rhythm.   Pulmonary:      Effort: Pulmonary effort is normal.      Breath sounds: Normal breath sounds.   Abdominal:      General: Bowel sounds are normal.   Musculoskeletal:         General: Normal range of motion.      Cervical back: Normal range of motion.   Skin:     General: Skin is warm and dry.   Neurological:      General: No focal deficit present.      Mental Status: She is alert and oriented to person, place, and time.   Psychiatric:         Mood and Affect: Mood normal.         Behavior: Behavior normal.          Result Review   Comprehensive Metabolic Panel (06/28/2021 13:10)       Diagnoses and all orders for this visit:    1. Breast cancer screening by mammogram (Primary)  -     Mammo Screening, Bilateral (Annually); " Future    2. Encounter for initial prescription of injectable contraceptive  -     POCT pregnancy, urine  -     Discontinue: medroxyPROGESTERone (DEPO-PROVERA) injection 150 mg  -     medroxyPROGESTERone (Depo-Provera) 150 MG/ML injection; Inject 1 mL into the appropriate muscle as directed by prescriber Every 3 (Three) Months for 90 days.  Dispense: 1 mL; Refill: 0    3. Recurrent acute suppurative otitis media without spontaneous rupture of left tympanic membrane  -     ofloxacin (Ocuflox) 0.3 % ophthalmic solution; Administer 1 drop into the left eye 2 (Two) Times a Day for 10 days.  Dispense: 5 mL; Refill: 0    4. Moderate episode of recurrent major depressive disorder (HCC)  -     Comprehensive Metabolic Panel; Future    5. Hyperlipidemia, unspecified hyperlipidemia type  -     Lipid Panel; Future             Follow Up   Return in about 3 months (around 1/15/2022).  Patient was given instructions and counseling regarding her condition or for health maintenance advice. Please see specific information pulled into the AVS if appropriate.       Sofie Ku MD

## 2021-10-18 ENCOUNTER — CLINICAL SUPPORT (OUTPATIENT)
Dept: FAMILY MEDICINE CLINIC | Facility: CLINIC | Age: 41
End: 2021-10-18

## 2021-10-18 DIAGNOSIS — Z30.9 ENCOUNTER FOR CONTRACEPTIVE MANAGEMENT, UNSPECIFIED TYPE: ICD-10-CM

## 2021-10-18 LAB
B-HCG UR QL: NEGATIVE
EXPIRATION DATE: NORMAL
INTERNAL NEGATIVE CONTROL: NORMAL
INTERNAL POSITIVE CONTROL: NORMAL
Lab: NORMAL

## 2021-10-18 PROCEDURE — 96372 THER/PROPH/DIAG INJ SC/IM: CPT | Performed by: FAMILY MEDICINE

## 2021-10-18 PROCEDURE — 81025 URINE PREGNANCY TEST: CPT | Performed by: FAMILY MEDICINE

## 2021-10-18 RX ORDER — MEDROXYPROGESTERONE ACETATE 150 MG/ML
150 INJECTION, SUSPENSION INTRAMUSCULAR ONCE
Status: COMPLETED | OUTPATIENT
Start: 2021-10-18 | End: 2021-10-18

## 2021-10-18 RX ADMIN — MEDROXYPROGESTERONE ACETATE 150 MG: 150 INJECTION, SUSPENSION INTRAMUSCULAR at 13:25

## 2021-10-20 ENCOUNTER — TELEMEDICINE (OUTPATIENT)
Dept: PSYCHIATRY | Facility: CLINIC | Age: 41
End: 2021-10-20

## 2021-10-20 DIAGNOSIS — Z63.4 BEREAVEMENT: ICD-10-CM

## 2021-10-20 DIAGNOSIS — F51.05 INSOMNIA DUE TO MENTAL CONDITION: ICD-10-CM

## 2021-10-20 DIAGNOSIS — F41.1 GENERALIZED ANXIETY DISORDER: ICD-10-CM

## 2021-10-20 DIAGNOSIS — F31.74 BIPOLAR 1 DISORDER, MANIC, FULL REMISSION (HCC): Primary | ICD-10-CM

## 2021-10-20 PROCEDURE — 99214 OFFICE O/P EST MOD 30 MIN: CPT | Performed by: STUDENT IN AN ORGANIZED HEALTH CARE EDUCATION/TRAINING PROGRAM

## 2021-10-20 RX ORDER — HYDROXYZINE HYDROCHLORIDE 25 MG/1
25 TABLET, FILM COATED ORAL DAILY PRN
Qty: 30 TABLET | Refills: 2 | Status: SHIPPED | OUTPATIENT
Start: 2021-10-20 | End: 2022-06-22

## 2021-10-20 RX ORDER — MEDROXYPROGESTERONE ACETATE 150 MG/ML
INJECTION, SUSPENSION INTRAMUSCULAR
COMMUNITY
Start: 2021-10-15 | End: 2022-11-29

## 2021-10-20 RX ORDER — TEMAZEPAM 30 MG/1
30 CAPSULE ORAL NIGHTLY PRN
Qty: 30 CAPSULE | Refills: 2 | Status: SHIPPED | OUTPATIENT
Start: 2021-10-21 | End: 2022-03-03 | Stop reason: SDUPTHER

## 2021-10-20 SDOH — SOCIAL STABILITY - SOCIAL INSECURITY: DISSAPEARANCE AND DEATH OF FAMILY MEMBER: Z63.4

## 2021-10-20 NOTE — PATIENT INSTRUCTIONS
1.  Please return to clinic at your next scheduled visit.  Contact the clinic ([phone]) at least 24 hours prior in the event you need to cancel.  2.  Do no harm to yourself or others.    3.  Avoid alcohol and drugs.    4.  Take all medications as prescribed.  Please contact the clinic with any concerns. If you are in need of medication refills, please call the clinic at [phone].    5. Should you want to get in touch with your provider, Dr. Stanley Quinones, please utilize [email or web address] or contact the office ([phone]), and staff will be able to page Dr. Quinones directly.  6.  In the event you have personal crisis, contact the following crisis numbers: Suicide Prevention Hotline 1-639.868.9837; [facility] Helpline 5-465-540-JTGK; [facility] Emergency Room [phone]; text HELLO to 759330; or 685.     SPECIFIC RECOMMENDATIONS:     1.      Medications discussed at this encounter:                   -Discontinue venlafaxine and Abilify.  Continue other medications along with hydroxyzine.     2.      Psychotherapy recommendations:      3.     Return to clinic: 8 weeks

## 2021-10-20 NOTE — PROGRESS NOTES
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 agreed to proceed.   b. CONTINUE seroquel 200 mg PO QHS. Risks, benefits, alternatives discussed with patient including nausea and vomiting, GI upset, sedation, akathisia, hypotension, increased appetite, lowering of seizure threshold, theoretical risk of tardive dyskinesia, extrapyramidal symptoms, restless legs syndrome. After discussion of these risks and benefits, the patient voiced understanding and agreed to proceed.  c. CONTINUE lamotrigine 400 mg p.o. daily. Risks, benefits, alternatives discussed with patient including rash, rebound depressive or manic symptoms if prompt discontinuation, GI upset, agitation, sedation.  After discussion of these risks and benefits, patient voiced understanding and agreed to proceed.   d. CONTINUE hydroxyzine 25 mg PO QDAY PRN anxiety. Risks, benefits, alternatives discussed with patient including sedation, dizziness, fall risk, GI upset.  After discussion of these risks and benefits, the patient voiced understanding and agreed to proceed.  e. DISCONTINUE venlafaxine 112.5 mg PO QDAY.  f. DISCONTINUE abilify 5 mg daily.   g. CONTINUE temazepam 30 mg PO QHS. Risks, benefits, alternatives discussed with patient including GI upset, sedation, dizziness, respiratory depression, falls risk.  After discussion of these risks and benefits, the  patient voiced understanding and agreed to proceed. Yasmany ordered. UDS ordered. Controlled substances agreement verbally signed.  h. S/P trazodone for insomnia (no effect), mirtazapine (no effect), quetiapine (didn't take, now taking), eszopiclone (no effect), olanzapine, aripiprazole 5 mg (because the patient was already on Seroquel).   20. Therapy: Continue with [facility].  21. Follow Up: 8 wks.     TREATMENT PLAN/GOALS: Continue supportive psychotherapy efforts and medications as indicated. Treatment and medication options discussed during today's visit. Patient acknowledged and verbally consented to continue with current treatment plan and was educated on the importance of compliance with treatment and follow-up appointments.    MEDICATION ISSUES:  YASMANY reviewed as expected.  Discussed medication options and treatment plan of prescribed medication as well as the risks, benefits, and side effects including potential falls, possible impaired driving and metabolic adversities among others. Patient is agreeable to call the office with any worsening of symptoms or onset of side effects. Patient is agreeable to call 911 or go to the nearest ER should he/she begin having SI/HI. No medication side effects or related complaints today.     MEDS ORDERED DURING VISIT:  New Medications Ordered This Visit   Medications   • temazepam (RESTORIL) 30 MG capsule     Sig: Take 1 capsule by mouth At Night As Needed for Sleep.     Dispense:  30 capsule     Refill:  2   • hydrOXYzine (ATARAX) 25 MG tablet     Sig: Take 1 tablet by mouth Daily As Needed for Anxiety.     Dispense:  30 tablet     Refill:  2       Return in about 8 weeks (around 12/15/2021).         This document has been electronically signed by Stanley Quinones MD  October 20, 2021 10:42 EDT      Part of this note may be an electronic transcription/translation of spoken language to printed text using the Dragon Dictation System.

## 2021-10-25 ENCOUNTER — TRANSCRIBE ORDERS (OUTPATIENT)
Dept: ADMINISTRATIVE | Facility: HOSPITAL | Age: 41
End: 2021-10-25

## 2021-10-25 DIAGNOSIS — Z92.89 HISTORY OF MAMMOGRAPHY, SCREENING: Primary | ICD-10-CM

## 2021-11-30 RX ORDER — BUSPIRONE HYDROCHLORIDE 10 MG/1
TABLET ORAL
Qty: 180 TABLET | Refills: 0 | OUTPATIENT
Start: 2021-11-30

## 2021-11-30 RX ORDER — TIOTROPIUM BROMIDE INHALATION SPRAY 1.56 UG/1
SPRAY, METERED RESPIRATORY (INHALATION)
Qty: 4 G | Refills: 0 | Status: SHIPPED | OUTPATIENT
Start: 2021-11-30 | End: 2022-03-31

## 2021-12-11 DIAGNOSIS — H66.005 RECURRENT ACUTE SUPPURATIVE OTITIS MEDIA WITHOUT SPONTANEOUS RUPTURE OF LEFT TYMPANIC MEMBRANE: ICD-10-CM

## 2021-12-13 ENCOUNTER — TELEPHONE (OUTPATIENT)
Dept: FAMILY MEDICINE CLINIC | Facility: CLINIC | Age: 41
End: 2021-12-13

## 2021-12-13 RX ORDER — OFLOXACIN 3 MG/ML
SOLUTION/ DROPS OPHTHALMIC
Qty: 5 ML | Refills: 0 | Status: SHIPPED | OUTPATIENT
Start: 2021-12-13 | End: 2023-04-04

## 2021-12-13 RX ORDER — LEVALBUTEROL HYDROCHLORIDE 1.25 MG/3ML
SOLUTION RESPIRATORY (INHALATION)
Qty: 72 ML | Refills: 0 | Status: SHIPPED | OUTPATIENT
Start: 2021-12-13 | End: 2021-12-14

## 2021-12-13 NOTE — PROGRESS NOTES
"Chief Complaint  Pneumonia and rash on legs    Subjective          Elly Barrett presents to [facility]  History of Present Illness   She presents for an acute visit today, she is a patient of Dr. Ku.  She has been seen at an urgent care.  She said she was seen at [facility] urgent care on 12/12/2021 and was told she had pneumonia.  She was tested for Covid but states her Covid test was negative.  She states she was given duo nebs meds for her nebulizer but she cannot find her nebulizer machine.  She states it has been a couple years since she has had a nebulizer machine but she recently moved and she cannot find it.  She was started on doxycycline and she was given prednisone 20 mg twice daily.  She is taking Mucinex but she is complaining that she is having trouble with the congestion.  She also complains of a headache.  [place] urgent care notes and chest x-ray was reviewed.  Chest x-ray was normal.    She is a current smoker but states she has not been able to smoke since she has been sick.  Objective   Vital Signs:   /58   Pulse 76   Temp 97.9 °F (36.6 °C)   Ht 170.2 cm (67\")   Wt 58.9 kg (129 lb 12.8 oz)   SpO2 98%   BMI 20.33 kg/m²     Physical Exam  Vitals reviewed.   Constitutional:       Appearance: Normal appearance. She is well-developed.   Neck:      Thyroid: No thyroid mass, thyromegaly or thyroid tenderness.   Cardiovascular:      Rate and Rhythm: Normal rate and regular rhythm.      Heart sounds: No murmur heard.  No friction rub. No gallop.    Pulmonary:      Effort: Pulmonary effort is normal.      Breath sounds: Wheezing present. No rhonchi.      Comments: Coarse wheezes noted throughout all lung fields.  Lymphadenopathy:      Cervical: No cervical adenopathy.   Skin:     General: Skin is warm and dry.   Neurological:      Mental Status: She is alert and oriented to person, place, and time.      Cranial Nerves: No cranial nerve deficit. "   Psychiatric:         Mood and Affect: Mood and affect normal.         Behavior: Behavior normal.         Thought Content: Thought content normal. Thought content does not include homicidal or suicidal ideation.         Judgment: Judgment normal.        Result Review :       Data reviewed: Radiologic studies Chest x-ray.         Others  [facility]  12/12/2021  Component      COVID FLU PCR Source [facility]   SARS-CoV-2 PCR - [facility]   Influenza A PCR - [facility]   Influenza B PCR - [facility]   [facility] COVID Reason for Testing   COVID FLU Comment [facility]   COVID Result Comment     Component 12/12/2021       COVID FLU PCR Source [facility] Nasopharyngeal   SARS-CoV-2 PCR - [facility] Not Detected   Influenza A PCR - [facility] Not Detected   Influenza B PCR - [facility] Not Detected   [facility] COVID Reason for Testing SYMPTOMATIC RAPID TEST SHOWING COVID SYMPTOMS   COVID FLU Comment [facility] (note)   COVID Result Comment This test utilizes real     EXAMINATION: XR CHEST 2VW    DATE: 12/12/2021    COMPARISON: None available    HISTORY: Cough with shortness of breath    FINDINGS: Cardiac silhouette within normal limits. Lungs are hyperinflated but clear. No pleural effusion or pneumothorax. There is no focal consolidation. Bilateral breast augmentation.    IMPRESSION:   No acute cardiopulmonary radiographic abnormality.    Dictated by: Lloyd Salazar M.D.    Images and Report reviewed and interpreted by: Lloyd Salazar M.D.   Assessment and Plan    Diagnoses and all orders for this visit:    1. Acute bronchitis, unspecified organism (Primary)  -     methylPREDNISolone sodium succinate (SOLU-Medrol) injection 125 mg    2. Cough    Other orders  -     brompheniramine-pseudoephedrine-DM 30-2-10 MG/5ML syrup; Take 5 mL by mouth 4 (Four) Times a Day As Needed for Congestion or Cough.  Dispense: 473 mL; Refill: 0    I discussed with her that she has acute bronchitis.  I will give her Solu-Medrol 125 mg IM today.  Patient instructed to hold her second prednisone dose today and  then to take 3 prednisone tablets tomorrow.  Advised them to finish/complete all of her prednisone prescription.  Nebulizer machine supplied to patient today, advised to use the duo nebs 4 times a day and as needed up to 6 times a day.  Advised to finish/complete all doxycycline.  Bromfed-DM as needed for congestion/cough.  Advised to follow-up if not improving or any worsening of symptoms.      Follow Up   Return if symptoms worsen or fail to improve.  Patient was given instructions and counseling regarding her condition or for health maintenance advice. Please see specific information pulled into the AVS if appropriate.

## 2021-12-13 NOTE — TELEPHONE ENCOUNTER
Caller: Elly Barrett    Relationship: Self    Best call back number: [phone]    What is the best time to reach you: ANY     Who are you requesting to speak with (clinical staff, provider,  specific staff member): CLINICAL     What was the call regarding: PATIENT STATED SHE WAS DIAGNOSED WITH PNEUMONIA ON 12/12 AT THE ER SHE STATED THAT THE DR PRESCRIBED HER NEBULIZER TREATMENTS PATIENT STATED SHE HAS JUST RECENTLY  MOVED AND CANT FIND HER MACHINE TO DO THE TREATMENTS AND WOULD LIKE TO KNOW IF SHE CAN BE PRESCRIBED ANOTHER MACHINE     Do you require a callback: YES, PLEASE ADVICE

## 2021-12-14 ENCOUNTER — OFFICE VISIT (OUTPATIENT)
Dept: FAMILY MEDICINE CLINIC | Facility: CLINIC | Age: 41
End: 2021-12-14

## 2021-12-14 VITALS
DIASTOLIC BLOOD PRESSURE: 58 MMHG | BODY MASS INDEX: 20.37 KG/M2 | TEMPERATURE: 97.9 F | OXYGEN SATURATION: 98 % | WEIGHT: 129.8 LBS | HEART RATE: 76 BPM | HEIGHT: 67 IN | SYSTOLIC BLOOD PRESSURE: 100 MMHG

## 2021-12-14 DIAGNOSIS — J20.9 ACUTE BRONCHITIS, UNSPECIFIED ORGANISM: Primary | ICD-10-CM

## 2021-12-14 DIAGNOSIS — R05.9 COUGH: ICD-10-CM

## 2021-12-14 PROCEDURE — 99214 OFFICE O/P EST MOD 30 MIN: CPT | Performed by: NURSE PRACTITIONER

## 2021-12-14 PROCEDURE — 96372 THER/PROPH/DIAG INJ SC/IM: CPT | Performed by: NURSE PRACTITIONER

## 2021-12-14 RX ORDER — METHYLPREDNISOLONE SODIUM SUCCINATE 125 MG/2ML
125 INJECTION, POWDER, LYOPHILIZED, FOR SOLUTION INTRAMUSCULAR; INTRAVENOUS ONCE
Status: COMPLETED | OUTPATIENT
Start: 2021-12-14 | End: 2021-12-14

## 2021-12-14 RX ORDER — IPRATROPIUM BROMIDE AND ALBUTEROL SULFATE 2.5; .5 MG/3ML; MG/3ML
3 SOLUTION RESPIRATORY (INHALATION)
COMMUNITY
Start: 2021-12-12 | End: 2022-12-08

## 2021-12-14 RX ORDER — DOXYCYCLINE HYCLATE 100 MG/1
100 CAPSULE ORAL
COMMUNITY
Start: 2021-12-12 | End: 2021-12-19

## 2021-12-14 RX ORDER — PREDNISONE 20 MG/1
20 TABLET ORAL
COMMUNITY
Start: 2021-12-12 | End: 2021-12-17

## 2021-12-14 RX ORDER — BROMPHENIRAMINE MALEATE, PSEUDOEPHEDRINE HYDROCHLORIDE, AND DEXTROMETHORPHAN HYDROBROMIDE 2; 30; 10 MG/5ML; MG/5ML; MG/5ML
5 SYRUP ORAL 4 TIMES DAILY PRN
Qty: 473 ML | Refills: 0 | Status: SHIPPED | OUTPATIENT
Start: 2021-12-14 | End: 2022-06-28

## 2021-12-14 RX ADMIN — METHYLPREDNISOLONE SODIUM SUCCINATE 125 MG: 125 INJECTION, POWDER, LYOPHILIZED, FOR SOLUTION INTRAMUSCULAR; INTRAVENOUS at 10:27

## 2021-12-15 ENCOUNTER — TELEMEDICINE (OUTPATIENT)
Dept: PSYCHIATRY | Facility: CLINIC | Age: 41
End: 2021-12-15

## 2021-12-15 DIAGNOSIS — F51.05 INSOMNIA DUE TO MENTAL CONDITION: ICD-10-CM

## 2021-12-15 DIAGNOSIS — F31.74 BIPOLAR 1 DISORDER, MANIC, FULL REMISSION (HCC): Primary | ICD-10-CM

## 2021-12-15 DIAGNOSIS — F41.1 GENERALIZED ANXIETY DISORDER: ICD-10-CM

## 2021-12-15 PROCEDURE — 99214 OFFICE O/P EST MOD 30 MIN: CPT | Performed by: STUDENT IN AN ORGANIZED HEALTH CARE EDUCATION/TRAINING PROGRAM

## 2021-12-15 PROCEDURE — 90833 PSYTX W PT W E/M 30 MIN: CPT | Performed by: STUDENT IN AN ORGANIZED HEALTH CARE EDUCATION/TRAINING PROGRAM

## 2021-12-15 RX ORDER — QUETIAPINE FUMARATE 200 MG/1
200 TABLET, FILM COATED ORAL NIGHTLY
Qty: 90 TABLET | Refills: 0 | Status: SHIPPED | OUTPATIENT
Start: 2021-12-15 | End: 2022-03-03 | Stop reason: SDUPTHER

## 2021-12-15 RX ORDER — SCOLOPAMINE TRANSDERMAL SYSTEM 1 MG/1
PATCH, EXTENDED RELEASE TRANSDERMAL
COMMUNITY
Start: 2021-12-13 | End: 2022-01-18

## 2021-12-15 RX ORDER — LEVALBUTEROL HYDROCHLORIDE 1.25 MG/3ML
SOLUTION RESPIRATORY (INHALATION) EVERY 4 HOURS PRN
COMMUNITY
Start: 2021-12-14 | End: 2022-10-28

## 2021-12-15 NOTE — PROGRESS NOTES
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 without changes. Consider low dose gabapentin 100 mg tid for anxiety. Consider Seroquel. Follow-up in 4 wks. Continue psychotherapy with [facility].        PLAN:  28. Risk Assessment: Risk of self-harm acutely is high, not imminent. Risk factors include severe recent psychosocial stressor, mood disorder, access to weapons, history of self-harm and suicide attempt, recent self-harm, recent admission to inpatient for SI. Protective factors include no family history, no present SI, minimal AODA, healthcare seeking, future orientation, willingness to engage in care, support from her daughter. Risk of self-harm chronically is also high, but could be further elevated in the event of treatment noncompliance and/or AODA.  29. Chronic Risk:   30. Safety: No acute safety concerns.  31. Medications:   a. CONTINUE melatonin 3 mg PO QHS. Risks, benefits, side effects discussed with patient including sedation, dizziness/falls risk, GI upset.  After discussion of these risks and benefits, the patient voiced understanding and agreed to proceed.   b. CONTINUE seroquel 200 mg PO QHS. Risks, benefits, alternatives discussed with patient including nausea and vomiting, GI upset, sedation, akathisia, hypotension, increased appetite, lowering of seizure threshold, theoretical risk of tardive  dyskinesia, extrapyramidal symptoms, restless legs syndrome. After discussion of these risks and benefits, the patient voiced understanding and agreed to proceed.  c. RESTARTED venlafaxine 37.5 mg PO QDAY. Risks, benefits, alternatives discussed with patient including GI upset, nausea vomiting diarrhea, theoretical decrease of seizure threshold predisposing the patient to a slightly higher seizure risk, headaches, sexual dysfunction, serotonin syndrome, bleeding risk, increased suicidality in patients 24 years and younger.  Also constipation and urinary retention.  After discussion of these risks and benefits, the patient voiced understanding and agreed to proceed.  d. CONTINUE lamotrigine 400 mg p.o. daily. Risks, benefits, alternatives discussed with patient including rash, rebound depressive or manic symptoms if prompt discontinuation, GI upset, agitation, sedation.  After discussion of these risks and benefits, patient voiced understanding and agreed to proceed.   e. CONTINUE hydroxyzine 25 mg PO QDAY PRN anxiety. Risks, benefits, alternatives discussed with patient including sedation, dizziness, fall risk, GI upset.  After discussion of these risks and benefits, the patient voiced understanding and agreed to proceed.  f. CONTINUE temazepam 30 mg PO QHS. Risks, benefits, alternatives discussed with patient including GI upset, sedation, dizziness, respiratory depression, falls risk.  After discussion of these risks and benefits, the patient voiced understanding and agreed to proceed. Rick ordered. UDS ordered. Controlled substances agreement verbally signed.  g. S/P   i. abilify (stopped because also on seroquel)  ii. trazodone for insomnia (no effect), mirtazapine (no effect), quetiapine (didn't take, now taking), eszopiclone (no effect), olanzapine, aripiprazole 5 mg (because the patient was already on Seroquel).   32. Therapy: Continue with [facility].  33. Follow Up: 8 wks.     TREATMENT PLAN/GOALS:  Continue supportive psychotherapy efforts and medications as indicated. Treatment and medication options discussed during today's visit. Patient acknowledged and verbally consented to continue with current treatment plan and was educated on the importance of compliance with treatment and follow-up appointments.    MEDICATION ISSUES:  YASMANY reviewed as expected.  Discussed medication options and treatment plan of prescribed medication as well as the risks, benefits, and side effects including potential falls, possible impaired driving and metabolic adversities among others. Patient is agreeable to call the office with any worsening of symptoms or onset of side effects. Patient is agreeable to call 911 or go to the nearest ER should he/she begin having SI/HI. No medication side effects or related complaints today.     MEDS ORDERED DURING VISIT:  New Medications Ordered This Visit   Medications   • QUEtiapine (SEROquel) 200 MG tablet     Sig: Take 1 tablet by mouth Every Night.     Dispense:  90 tablet     Refill:  0       Return in about 8 weeks (around 2/9/2022).         This document has been electronically signed by Stanley Quinones MD  December 15, 2021 11:44 EST      Part of this note may be an electronic transcription/translation of spoken language to printed text using the Dragon Dictation System.

## 2021-12-15 NOTE — PATIENT INSTRUCTIONS
1.  Please return to clinic at your next scheduled visit.  Contact the clinic ([phone]) at least 24 hours prior in the event you need to cancel.  2.  Do no harm to yourself or others.    3.  Avoid alcohol and drugs.    4.  Take all medications as prescribed.  Please contact the clinic with any concerns. If you are in need of medication refills, please call the clinic at [phone].    5. Should you want to get in touch with your provider, Dr. Stanley Quinones, please utilize [employer] or contact the office ([phone]), and staff will be able to page Dr. Quinones directly.  6.  In the event you have personal crisis, contact the following crisis numbers: Suicide Prevention Hotline 1-532.384.6245; [facility] Helpline 8-493-239-CCKS; [facility] Emergency Room [phone]; text HELLO to 020544; or 794.     SPECIFIC RECOMMENDATIONS:     1.      Medications discussed at this encounter:                   -      2.      Psychotherapy recommendations:      3.     Return to clinic: 8 weeks

## 2021-12-27 DIAGNOSIS — J40 BRONCHITIS: Primary | ICD-10-CM

## 2021-12-27 DIAGNOSIS — J18.9 PNEUMONIA DUE TO INFECTIOUS ORGANISM, UNSPECIFIED LATERALITY, UNSPECIFIED PART OF LUNG: ICD-10-CM

## 2022-01-03 RX ORDER — PROMETHAZINE HYDROCHLORIDE 25 MG/1
TABLET ORAL
Qty: 30 TABLET | Refills: 0 | Status: SHIPPED | OUTPATIENT
Start: 2022-01-03 | End: 2022-02-21

## 2022-01-17 DIAGNOSIS — H66.005 RECURRENT ACUTE SUPPURATIVE OTITIS MEDIA WITHOUT SPONTANEOUS RUPTURE OF LEFT TYMPANIC MEMBRANE: ICD-10-CM

## 2022-01-17 RX ORDER — OFLOXACIN 3 MG/ML
SOLUTION/ DROPS OPHTHALMIC
Qty: 5 ML | Refills: 0 | OUTPATIENT
Start: 2022-01-17

## 2022-01-17 RX ORDER — ARIPIPRAZOLE 5 MG/1
TABLET ORAL
Qty: 60 TABLET | Refills: 0 | OUTPATIENT
Start: 2022-01-17

## 2022-01-17 RX ORDER — BUSPIRONE HYDROCHLORIDE 10 MG/1
TABLET ORAL
Qty: 180 TABLET | Refills: 0 | OUTPATIENT
Start: 2022-01-17

## 2022-01-18 ENCOUNTER — OFFICE VISIT (OUTPATIENT)
Dept: FAMILY MEDICINE CLINIC | Facility: CLINIC | Age: 42
End: 2022-01-18

## 2022-01-18 VITALS
WEIGHT: 132.4 LBS | HEIGHT: 67 IN | SYSTOLIC BLOOD PRESSURE: 124 MMHG | TEMPERATURE: 98.2 F | BODY MASS INDEX: 20.78 KG/M2 | DIASTOLIC BLOOD PRESSURE: 68 MMHG | HEART RATE: 78 BPM | OXYGEN SATURATION: 99 %

## 2022-01-18 DIAGNOSIS — K59.04 CHRONIC IDIOPATHIC CONSTIPATION: Primary | ICD-10-CM

## 2022-01-18 DIAGNOSIS — H69.91 EUSTACHIAN TUBE DISORDER, RIGHT: ICD-10-CM

## 2022-01-18 DIAGNOSIS — K21.00 CHRONIC REFLUX ESOPHAGITIS: ICD-10-CM

## 2022-01-18 DIAGNOSIS — E78.5 HYPERLIPIDEMIA, UNSPECIFIED HYPERLIPIDEMIA TYPE: ICD-10-CM

## 2022-01-18 DIAGNOSIS — Z30.013 ENCOUNTER FOR INITIAL PRESCRIPTION OF INJECTABLE CONTRACEPTIVE: ICD-10-CM

## 2022-01-18 DIAGNOSIS — F33.1 MODERATE EPISODE OF RECURRENT MAJOR DEPRESSIVE DISORDER: ICD-10-CM

## 2022-01-18 LAB
ALBUMIN SERPL-MCNC: 4.5 G/DL (ref 3.5–5.2)
ALBUMIN/GLOB SERPL: 1.7 G/DL
ALP SERPL-CCNC: 84 U/L (ref 39–117)
ALT SERPL W P-5'-P-CCNC: 11 U/L (ref 1–33)
ANION GAP SERPL CALCULATED.3IONS-SCNC: 9.7 MMOL/L (ref 5–15)
AST SERPL-CCNC: 15 U/L (ref 1–32)
BILIRUB SERPL-MCNC: 0.3 MG/DL (ref 0–1.2)
BUN SERPL-MCNC: 10 MG/DL (ref 6–20)
BUN/CREAT SERPL: 11 (ref 7–25)
CALCIUM SPEC-SCNC: 9.5 MG/DL (ref 8.6–10.5)
CHLORIDE SERPL-SCNC: 107 MMOL/L (ref 98–107)
CO2 SERPL-SCNC: 25.3 MMOL/L (ref 22–29)
CREAT SERPL-MCNC: 0.91 MG/DL (ref 0.57–1)
GFR SERPL CREATININE-BSD FRML MDRD: 68 ML/MIN/1.73
GLOBULIN UR ELPH-MCNC: 2.6 GM/DL
GLUCOSE SERPL-MCNC: 77 MG/DL (ref 65–99)
POTASSIUM SERPL-SCNC: 4.4 MMOL/L (ref 3.5–5.2)
PROT SERPL-MCNC: 7.1 G/DL (ref 6–8.5)
SODIUM SERPL-SCNC: 142 MMOL/L (ref 136–145)

## 2022-01-18 PROCEDURE — 80053 COMPREHEN METABOLIC PANEL: CPT | Performed by: FAMILY MEDICINE

## 2022-01-18 PROCEDURE — 99214 OFFICE O/P EST MOD 30 MIN: CPT | Performed by: FAMILY MEDICINE

## 2022-01-18 RX ORDER — MEDROXYPROGESTERONE ACETATE 150 MG/ML
150 INJECTION, SUSPENSION INTRAMUSCULAR
Qty: 1 ML | Refills: 3 | Status: SHIPPED | OUTPATIENT
Start: 2022-01-18 | End: 2022-07-11

## 2022-01-18 NOTE — PROGRESS NOTES
Chief Complaint  Chief Complaint   Patient presents with   • Contraception Management       HPI:  Elly Barrett presents to [facility]     Pt reports she is seeing someone for the last 3 months and she is still getting her Depo shot.     GERD- pt [facility]. Pt had an EGD 2 years ago with Dr. Villalta and she was diagnosed with esophagitis. Pt was given samples of Dexilant in the office today.    Eustachian tube dysfunction- pt has fluid behind her right ear for the last 2 months since she went down [facility].     Medical History:  Past History:  Medical History: has a past medical history of Abdominal pain, Acid reflux, ADHD, Allergies, Allergy, Anxiety, Asthma, Bipolar disorder (HCC), Borderline personality disorder (HCC), Chronic idiopathic constipation, Constipation, Depression, Epigastric pain, Frequent headaches, Hallucinations, Hemorrhoids, Insomnia, Major depressive disorder, Microscopic hematuria, Migraines, Nausea and vomiting, Panic disorder, Psychiatric illness, PTSD (post-traumatic stress disorder), Seasonal allergies, Suicidal thoughts, and Tobacco use.   Surgical History: has a past surgical history that includes Breast Augmentation;  section (); and Esophagogastroduodenoscopy (2018).   Family History: family history includes Alcohol abuse in her father; Anxiety disorder in her maternal grandmother; Arthritis in her father and another family member; Bipolar disorder in her maternal grandmother; Depression in her maternal grandmother; Diabetes in her mother; Drug abuse in her father; Heart disease in her father and mother; Osteoporosis in her father and mother; Stroke in her father.   Social History: reports that she has been smoking cigarettes. She has a 25.00 pack-year smoking history. She has never used smokeless tobacco. She reports previous alcohol use. She reports that she does not use  drugs.  Immunization History   Administered Date(s) Administered   • COVID-19 (MODERNA) 1st, 2nd, 3rd Dose Only 04/14/2021, 04/14/2021, 05/13/2021, 05/13/2021   • Tdap 03/04/2014         Allergies: Aspirin, Cephalexin, Codeine, Egg phospholipids, Nsaids, and Penicillins     Medications:  Current Outpatient Medications on File Prior to Visit   Medication Sig Dispense Refill   • ALBUTEROL SULFATE HFA IN Inhale 2 puffs Every 4 (Four) Hours As Needed.     • hydrOXYzine (ATARAX) 25 MG tablet Take 1 tablet by mouth Daily As Needed for Anxiety. 30 tablet 2   • lamoTRIgine (LaMICtal) 200 MG tablet Take 400 mg by mouth every night at bedtime.     • Linzess 145 MCG capsule capsule TAKE 1 CAPSULE BY MOUTH ONCE DAILY ON AN EMPTY STOMACH AT LEAST 30 MINUTES BEFORE THE FIRST MEAL OF THE DAY FOR 90 DAYS 30 capsule 0   • medroxyPROGESTERone Acetate 150 MG/ML suspension prefilled syringe      • montelukast (Singulair) 10 MG tablet Take 1 tablet by mouth Every Night. 90 tablet 0   • QUEtiapine (SEROquel) 200 MG tablet Take 1 tablet by mouth Every Night. 90 tablet 0   • Spiriva Respimat 1.25 MCG/ACT aerosol solution inhaler INHALE 2 PUFFS BY MOUTH ONCE DAILY 4 g 0   • temazepam (RESTORIL) 30 MG capsule Take 1 capsule by mouth At Night As Needed for Sleep. 30 capsule 2   • venlafaxine XR (EFFEXOR-XR) 37.5 MG 24 hr capsule Take 37.5 mg by mouth Daily. with food     • Xopenex 1.25 MG/3ML nebulizer solution      • brompheniramine-pseudoephedrine-DM 30-2-10 MG/5ML syrup Take 5 mL by mouth 4 (Four) Times a Day As Needed for Congestion or Cough. 473 mL 0   • ipratropium-albuterol (DUO-NEB) 0.5-2.5 mg/3 ml nebulizer Inhale 3 mL.     • ofloxacin (OCUFLOX) 0.3 % ophthalmic solution INSTILL 1 DROP INTO LEFT EAR TWICE DAILY FOR 10 DAYS 5 mL 0   • promethazine (PHENERGAN) 25 MG tablet Take 1 tablet by mouth once daily 30 tablet 0   • [DISCONTINUED] medroxyPROGESTERone (Depo-Provera) 150 MG/ML injection Inject 1 mL into the appropriate muscle as  "directed by prescriber Every 3 (Three) Months for 90 days. 1 mL 0   • [DISCONTINUED] ondansetron ODT (ZOFRAN-ODT) 8 MG disintegrating tablet DISSOLVE 1 TABLET IN MOUTH THREE TIMES DAILY AS NEEDED FOR NAUSEA     • [DISCONTINUED] Scopolamine 1 MG/3DAYS patch        No current facility-administered medications on file prior to visit.        Health Maintenance Due   Topic Date Due   • ANNUAL PHYSICAL  Never done   • Pneumococcal Vaccine 0-64 (1 of 2 - PPSV23) Never done   • HEPATITIS C SCREENING  Never done   • PAP SMEAR  Never done   • COVID-19 Vaccine (3 - Booster for Moderna series) 11/13/2021       Vital Signs:   Vitals:    01/18/22 1142   BP: 124/68   BP Location: Right arm   Patient Position: Sitting   Cuff Size: Adult   Pulse: 78   Temp: 98.2 °F (36.8 °C)   SpO2: 99%   Weight: 60.1 kg (132 lb 6.4 oz)   Height: 170.2 cm (67\")      Body mass index is 20.74 kg/m².     ROS:  Review of Systems   Constitutional: Negative for fatigue and fever.   HENT: Negative for congestion, ear pain and sinus pressure.    Respiratory: Negative for cough, chest tightness and shortness of breath.    Cardiovascular: Negative for chest pain, palpitations and leg swelling.   Gastrointestinal: Negative for abdominal pain and diarrhea.   Genitourinary: Negative for dysuria and frequency.   Neurological: Negative for speech difficulty, headache and confusion.   Psychiatric/Behavioral: Negative for agitation and behavioral problems.          Physical Exam  Constitutional:       Appearance: Normal appearance.   HENT:      Right Ear: Tympanic membrane normal.      Left Ear: Tympanic membrane normal.      Nose: Nose normal.   Eyes:      Extraocular Movements: Extraocular movements intact.      Conjunctiva/sclera: Conjunctivae normal.      Pupils: Pupils are equal, round, and reactive to light.   Cardiovascular:      Rate and Rhythm: Normal rate and regular rhythm.   Pulmonary:      Effort: Pulmonary effort is normal.      Breath sounds: Normal " breath sounds.   Abdominal:      General: Bowel sounds are normal.   Musculoskeletal:         General: Normal range of motion.      Cervical back: Normal range of motion.   Skin:     General: Skin is warm and dry.   Neurological:      General: No focal deficit present.      Mental Status: She is alert and oriented to person, place, and time.   Psychiatric:         Mood and Affect: Mood normal.         Behavior: Behavior normal.          Result Review   Comprehensive Metabolic Panel (06/28/2021 13:10)       Diagnoses and all orders for this visit:    1. Chronic idiopathic constipation (Primary)  -     Ambulatory Referral to Gastroenterology    2. Encounter for initial prescription of injectable contraceptive  -     medroxyPROGESTERone (Depo-Provera) 150 MG/ML injection; Inject 1 mL into the appropriate muscle as directed by prescriber Every 3 (Three) Months for 90 days.  Dispense: 1 mL; Refill: 3    3. Chronic reflux esophagitis  -     Ambulatory Referral to Gastroenterology    4. Eustachian tube disorder, right  -     Ambulatory Referral to ENT (Otolaryngology)    5. Hyperlipidemia, unspecified hyperlipidemia type  -     Lipid Panel    6. Moderate episode of recurrent major depressive disorder (HCC)  -     Comprehensive Metabolic Panel          Smoking Cessation:    Elly Barrett  reports that she has been smoking cigarettes. She has a 25.00 pack-year smoking history. She has never used smokeless tobacco.          Follow Up   Return in about 3 months (around 4/18/2022).  Patient was given instructions and counseling regarding her condition or for health maintenance advice. Please see specific information pulled into the AVS if appropriate.       Sofie Ku MD

## 2022-01-19 ENCOUNTER — CLINICAL SUPPORT (OUTPATIENT)
Dept: FAMILY MEDICINE CLINIC | Facility: CLINIC | Age: 42
End: 2022-01-19

## 2022-01-19 DIAGNOSIS — Z30.42 ENCOUNTER FOR SURVEILLANCE OF INJECTABLE CONTRACEPTIVE: Primary | ICD-10-CM

## 2022-01-19 PROCEDURE — 96372 THER/PROPH/DIAG INJ SC/IM: CPT | Performed by: NURSE PRACTITIONER

## 2022-01-19 RX ORDER — MEDROXYPROGESTERONE ACETATE 150 MG/ML
150 INJECTION, SUSPENSION INTRAMUSCULAR ONCE
Status: COMPLETED | OUTPATIENT
Start: 2022-01-19 | End: 2022-01-19

## 2022-01-19 RX ADMIN — MEDROXYPROGESTERONE ACETATE 150 MG: 150 INJECTION, SUSPENSION INTRAMUSCULAR at 10:20

## 2022-02-10 ENCOUNTER — TELEPHONE (OUTPATIENT)
Dept: FAMILY MEDICINE CLINIC | Facility: CLINIC | Age: 42
End: 2022-02-10

## 2022-02-10 NOTE — TELEPHONE ENCOUNTER
Caller: Elly Barrett    Relationship: Self    Best call back number: [phone]    What medication are you requesting: PAIN MEDICATIONS    What are your current symptoms: MIGRAINES    How long have you been experiencing symptoms: 5 DAYS     Have you had these symptoms before:    [x] Yes  [] No    Have you been treated for these symptoms before:   [x] Yes  [] No    If a prescription is needed, what is your preferred pharmacy and phone number:      [address] [phone] [facility] [phone] FX       Additional notes:    PATIENT HAS HAD MIGRAINES FOR 5 DAYS AND SHE SAID SHE IS UNABLE TO GET OUT OF THE BED. SHE IS REQUESTING DR RICHARDSON TO PRESCRIBE HER SOME MEDICATION. SHE IS REQUESTING A CALL TO DISCUSS

## 2022-02-14 ENCOUNTER — TELEPHONE (OUTPATIENT)
Dept: FAMILY MEDICINE CLINIC | Facility: CLINIC | Age: 42
End: 2022-02-14

## 2022-02-14 NOTE — TELEPHONE ENCOUNTER
Caller: Elly Barrett    Relationship: Self    Best call back number: [phone]    What specialty or service is being requested: GASTRO    Any additional details: PATIENT RECEIVED LETTER FROM OFFICE STATING THAT OFFICE HAS BEEN ATTEMPTING TO CONTACT PATIENT TO SCHEDULE APPOINTMENT WITH GASTRO PROVIDER. PATIENT NEVER RECEIVED CALL. SHE IS REQUESTING A CALL BACK REGARDING THIS.

## 2022-02-18 ENCOUNTER — OFFICE VISIT (OUTPATIENT)
Dept: FAMILY MEDICINE CLINIC | Facility: CLINIC | Age: 42
End: 2022-02-18

## 2022-02-18 VITALS
HEIGHT: 67 IN | OXYGEN SATURATION: 99 % | HEART RATE: 87 BPM | SYSTOLIC BLOOD PRESSURE: 124 MMHG | BODY MASS INDEX: 20.81 KG/M2 | TEMPERATURE: 97.9 F | DIASTOLIC BLOOD PRESSURE: 70 MMHG | WEIGHT: 132.6 LBS

## 2022-02-18 DIAGNOSIS — Z12.4 CERVICAL CANCER SCREENING: Primary | ICD-10-CM

## 2022-02-18 DIAGNOSIS — Z12.31 BREAST CANCER SCREENING BY MAMMOGRAM: ICD-10-CM

## 2022-02-18 PROCEDURE — G0123 SCREEN CERV/VAG THIN LAYER: HCPCS | Performed by: FAMILY MEDICINE

## 2022-02-18 PROCEDURE — 87624 HPV HI-RISK TYP POOLED RSLT: CPT | Performed by: FAMILY MEDICINE

## 2022-02-18 PROCEDURE — 99396 PREV VISIT EST AGE 40-64: CPT | Performed by: FAMILY MEDICINE

## 2022-02-18 NOTE — PROGRESS NOTES
Chief Complaint  Chief Complaint   Patient presents with   • Gynecologic Exam       HPI:  Elly Barrett presents to [facility]     Pt has no current complaints. Pt is sexually active.    Pt has not had her mammogram which was ordered in .     Pt has had her last pap in  which was normal.     Medical History:  Past History:  Medical History: has a past medical history of Abdominal pain, Acid reflux, ADHD, Allergies, Allergy, Anxiety, Asthma, Bipolar disorder (HCC), Borderline personality disorder (HCC), Chronic idiopathic constipation, Constipation, Depression, Epigastric pain, Frequent headaches, Hallucinations, Hemorrhoids, Insomnia, Major depressive disorder, Microscopic hematuria, Migraines, Nausea and vomiting, Panic disorder, Psychiatric illness, PTSD (post-traumatic stress disorder), Seasonal allergies, Suicidal thoughts, and Tobacco use.   Surgical History: has a past surgical history that includes Breast Augmentation;  section (); and Esophagogastroduodenoscopy ().   Family History: family history includes Alcohol abuse in her father; Anxiety disorder in her maternal grandmother; Arthritis in her father and another family member; Bipolar disorder in her maternal grandmother; Depression in her maternal grandmother; Diabetes in her mother; Drug abuse in her father; Heart disease in her father and mother; Osteoporosis in her father and mother; Stroke in her father.   Social History: reports that she has been smoking cigarettes. She has a 25.00 pack-year smoking history. She has never used smokeless tobacco. She reports previous alcohol use. She reports that she does not use drugs.  Immunization History   Administered Date(s) Administered   • COVID-19 (MODERNA) 1st, 2nd, 3rd Dose Only 2021, 2021, 2021, 2021   • Tdap 2014         Allergies: Aspirin, Cephalexin, Codeine, Egg phospholipids, Nsaids, and Penicillins      Medications:  Current Outpatient Medications on File Prior to Visit   Medication Sig Dispense Refill   • ALBUTEROL SULFATE HFA IN Inhale 2 puffs Every 4 (Four) Hours As Needed.     • hydrOXYzine (ATARAX) 25 MG tablet Take 1 tablet by mouth Daily As Needed for Anxiety. 30 tablet 2   • lamoTRIgine (LaMICtal) 200 MG tablet Take 400 mg by mouth every night at bedtime.     • Linzess 145 MCG capsule capsule TAKE 1 CAPSULE BY MOUTH ONCE DAILY ON AN EMPTY STOMACH AT LEAST 30 MINUTES BEFORE THE FIRST MEAL OF THE DAY FOR 90 DAYS 30 capsule 0   • medroxyPROGESTERone (Depo-Provera) 150 MG/ML injection Inject 1 mL into the appropriate muscle as directed by prescriber Every 3 (Three) Months for 90 days. 1 mL 3   • montelukast (Singulair) 10 MG tablet Take 1 tablet by mouth Every Night. 90 tablet 0   • QUEtiapine (SEROquel) 200 MG tablet Take 1 tablet by mouth Every Night. 90 tablet 0   • Spiriva Respimat 1.25 MCG/ACT aerosol solution inhaler INHALE 2 PUFFS BY MOUTH ONCE DAILY 4 g 0   • temazepam (RESTORIL) 30 MG capsule Take 1 capsule by mouth At Night As Needed for Sleep. 30 capsule 2   • venlafaxine XR (EFFEXOR-XR) 37.5 MG 24 hr capsule Take 37.5 mg by mouth Daily. with food     • Xopenex 1.25 MG/3ML nebulizer solution      • brompheniramine-pseudoephedrine-DM 30-2-10 MG/5ML syrup Take 5 mL by mouth 4 (Four) Times a Day As Needed for Congestion or Cough. 473 mL 0   • ipratropium-albuterol (DUO-NEB) 0.5-2.5 mg/3 ml nebulizer Inhale 3 mL.     • medroxyPROGESTERone Acetate 150 MG/ML suspension prefilled syringe      • ofloxacin (OCUFLOX) 0.3 % ophthalmic solution INSTILL 1 DROP INTO LEFT EAR TWICE DAILY FOR 10 DAYS 5 mL 0     No current facility-administered medications on file prior to visit.        Health Maintenance Due   Topic Date Due   • ANNUAL PHYSICAL  Never done   • Pneumococcal Vaccine 0-64 (1 of 2 - PPSV23) Never done   • HEPATITIS C SCREENING  Never done   • COVID-19 Vaccine (3 - Booster for Moderna series)  "10/13/2021       Vital Signs:   Vitals:    02/18/22 0903   BP: 124/70   BP Location: Right arm   Patient Position: Sitting   Cuff Size: Adult   Pulse: 87   Temp: 97.9 °F (36.6 °C)   SpO2: 99%   Weight: 60.1 kg (132 lb 9.6 oz)   Height: 170.2 cm (67\")      Body mass index is 20.77 kg/m².     ROS:  Review of Systems   Constitutional: Negative for fatigue and fever.   HENT: Negative for congestion, ear pain and sinus pressure.    Respiratory: Negative for cough, chest tightness and shortness of breath.    Cardiovascular: Negative for chest pain, palpitations and leg swelling.   Gastrointestinal: Negative for abdominal pain and diarrhea.   Genitourinary: Negative for dysuria and frequency.   Neurological: Negative for speech difficulty, headache and confusion.   Psychiatric/Behavioral: Negative for agitation and behavioral problems.          Physical Exam  Vitals reviewed. Exam conducted with a chaperone present.   Constitutional:       Appearance: Normal appearance.   HENT:      Right Ear: Tympanic membrane normal.      Left Ear: Tympanic membrane normal.      Nose: Nose normal.   Eyes:      Extraocular Movements: Extraocular movements intact.      Conjunctiva/sclera: Conjunctivae normal.      Pupils: Pupils are equal, round, and reactive to light.   Cardiovascular:      Rate and Rhythm: Normal rate and regular rhythm.   Pulmonary:      Effort: Pulmonary effort is normal.      Breath sounds: Normal breath sounds.   Abdominal:      General: Bowel sounds are normal.   Genitourinary:     General: Normal vulva.      Pubic Area: No rash.       Labia:         Right: No rash.         Left: No rash.    Musculoskeletal:         General: Normal range of motion.      Cervical back: Normal range of motion.   Skin:     General: Skin is warm and dry.   Neurological:      General: No focal deficit present.      Mental Status: She is alert and oriented to person, place, and time.   Psychiatric:         Mood and Affect: Mood normal.    "      Behavior: Behavior normal.          Result Review   SCANNED - PAP SMEAR (06/18/2015)       Diagnoses and all orders for this visit:    1. Cervical cancer screening (Primary)  -     IgP, Aptima HPV    2. Breast cancer screening by mammogram  -     Mammo Screening Digital Tomosynthesis Bilateral With CAD; Future          Smoking Cessation:    Elly Barrett  reports that she has been smoking cigarettes. She has a 25.00 pack-year smoking history. She has never used smokeless tobacco. Pt I is not ready to quit yet.          Follow Up   Return in about 3 months (around 5/18/2022).  Patient was given instructions and counseling regarding her condition or for health maintenance advice. Please see specific information pulled into the AVS if appropriate.       Sofie Ku MD

## 2022-02-20 DIAGNOSIS — F31.74 BIPOLAR 1 DISORDER, MANIC, FULL REMISSION: ICD-10-CM

## 2022-02-21 RX ORDER — QUETIAPINE FUMARATE 200 MG/1
TABLET, FILM COATED ORAL
Qty: 90 TABLET | Refills: 0 | OUTPATIENT
Start: 2022-02-21

## 2022-02-21 RX ORDER — PROMETHAZINE HYDROCHLORIDE 25 MG/1
TABLET ORAL
Qty: 30 TABLET | Refills: 0 | Status: SHIPPED | OUTPATIENT
Start: 2022-02-21 | End: 2022-03-30

## 2022-02-22 LAB
CYTOLOGIST CVX/VAG CYTO: NORMAL
CYTOLOGY CVX/VAG DOC CYTO: NORMAL
CYTOLOGY CVX/VAG DOC THIN PREP: NORMAL
DX ICD CODE: NORMAL
HIV 1 & 2 AB SER-IMP: NORMAL
HPV I/H RISK 4 DNA CVX QL PROBE+SIG AMP: NEGATIVE
OTHER STN SPEC: NORMAL
STAT OF ADQ CVX/VAG CYTO-IMP: NORMAL

## 2022-02-22 NOTE — PROGRESS NOTES
Please call and inform patient that her Pap smear results were all normal and HPV negative.  Next one due tin 5 years.

## 2022-03-03 ENCOUNTER — TELEMEDICINE (OUTPATIENT)
Dept: PSYCHIATRY | Facility: CLINIC | Age: 42
End: 2022-03-03

## 2022-03-03 DIAGNOSIS — F41.1 GENERALIZED ANXIETY DISORDER: ICD-10-CM

## 2022-03-03 DIAGNOSIS — F51.05 INSOMNIA DUE TO MENTAL CONDITION: ICD-10-CM

## 2022-03-03 DIAGNOSIS — Z63.4 BEREAVEMENT: ICD-10-CM

## 2022-03-03 DIAGNOSIS — F31.74 BIPOLAR 1 DISORDER, MANIC, FULL REMISSION: Primary | ICD-10-CM

## 2022-03-03 PROCEDURE — 99214 OFFICE O/P EST MOD 30 MIN: CPT | Performed by: STUDENT IN AN ORGANIZED HEALTH CARE EDUCATION/TRAINING PROGRAM

## 2022-03-03 RX ORDER — QUETIAPINE FUMARATE 200 MG/1
200 TABLET, FILM COATED ORAL NIGHTLY
Qty: 90 TABLET | Refills: 0 | Status: SHIPPED | OUTPATIENT
Start: 2022-03-03 | End: 2022-06-22

## 2022-03-03 RX ORDER — LAMOTRIGINE 200 MG/1
400 TABLET ORAL
Qty: 120 TABLET | Refills: 2 | Status: SHIPPED | OUTPATIENT
Start: 2022-03-03 | End: 2022-08-30 | Stop reason: SDUPTHER

## 2022-03-03 RX ORDER — TEMAZEPAM 30 MG/1
30 CAPSULE ORAL NIGHTLY PRN
Qty: 30 CAPSULE | Refills: 2 | Status: SHIPPED | OUTPATIENT
Start: 2022-03-03 | End: 2022-08-30 | Stop reason: SDUPTHER

## 2022-03-03 SDOH — SOCIAL STABILITY - SOCIAL INSECURITY: DISSAPEARANCE AND DEATH OF FAMILY MEMBER: Z63.4

## 2022-03-03 NOTE — PATIENT INSTRUCTIONS
1.  Please return to clinic at your next scheduled visit.  Contact the clinic ([phone]) at least 24 hours prior in the event you need to cancel.  2.  Do no harm to yourself or others.    3.  Avoid alcohol and drugs.    4.  Take all medications as prescribed.  Please contact the clinic with any concerns. If you are in need of medication refills, please call the clinic at [phone].    5. Should you want to get in touch with your provider, Dr. Stanley Quinones, please utilize [employer] or contact the office ([phone]), and staff will be able to page Dr. Quinones directly.  6.  In the event you have personal crisis, contact the following crisis numbers: Suicide Prevention Hotline 1-738.712.5208; [facility] Helpline 9-635-131-HAWM; [facility] Emergency Room [phone]; text HELLO to 305638; or 297.     SPECIFIC RECOMMENDATIONS:     1.      Medications discussed at this encounter:                   -      2.      Psychotherapy recommendations:      3.     Return to clinic: 12 weeks

## 2022-03-03 NOTE — PROGRESS NOTES
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 other medications without changes. Consider low dose gabapentin 100 mg tid for anxiety. Consider Seroquel. Follow-up in 4 wks. Continue psychotherapy with [facility].        PLAN:  36. Risk Assessment: Risk of self-harm acutely is high, not imminent. Risk factors include severe recent psychosocial stressor, mood disorder, access to weapons, history of self-harm and suicide attempt, recent self-harm, recent admission to inpatient for SI. Protective factors include no family history, no present SI, minimal AODA, healthcare seeking, future orientation, willingness to engage in care, support from her daughter. Risk of self-harm chronically is also high, but could be further elevated in the event of treatment noncompliance and/or AODA.  37. Chronic Risk:   38. Safety: No acute safety concerns.  39. Medications:   a. CONTINUE melatonin 3 mg PO QHS. Risks, benefits, side effects discussed with patient including sedation, dizziness/falls risk, GI upset.  After discussion of these risks and benefits, the patient voiced understanding and agreed to proceed.   b. CONTINUE seroquel 200 mg PO QHS. Risks, benefits, alternatives discussed with patient including nausea and vomiting, GI upset, sedation, akathisia, hypotension, increased appetite, lowering of seizure threshold, theoretical risk of tardive  dyskinesia, extrapyramidal symptoms, restless legs syndrome. After discussion of these risks and benefits, the patient voiced understanding and agreed to proceed.  c. CONTINUE venlafaxine 37.5 mg PO QDAY. Risks, benefits, alternatives discussed with patient including GI upset, nausea vomiting diarrhea, theoretical decrease of seizure threshold predisposing the patient to a slightly higher seizure risk, headaches, sexual dysfunction, serotonin syndrome, bleeding risk, increased suicidality in patients 24 years and younger.  Also constipation and urinary retention.  After discussion of these risks and benefits, the patient voiced understanding and agreed to proceed.  d. CONTINUE lamotrigine 400 mg p.o. daily. Risks, benefits, alternatives discussed with patient including rash, rebound depressive or manic symptoms if prompt discontinuation, GI upset, agitation, sedation.  After discussion of these risks and benefits, patient voiced understanding and agreed to proceed.   e. CONTINUE hydroxyzine 25 mg PO QDAY PRN anxiety. Risks, benefits, alternatives discussed with patient including sedation, dizziness, fall risk, GI upset.  After discussion of these risks and benefits, the patient voiced understanding and agreed to proceed.  f. CONTINUE temazepam 30 mg PO QHS. Risks, benefits, alternatives discussed with patient including GI upset, sedation, dizziness, respiratory depression, falls risk.  After discussion of these risks and benefits, the patient voiced understanding and agreed to proceed. Rick ordered. UDS ordered. Controlled substances agreement verbally signed.  g. S/P   i. abilify (stopped because also on seroquel)  ii. trazodone for insomnia (no effect), mirtazapine (no effect), quetiapine (didn't take, now taking), eszopiclone (no effect), olanzapine, aripiprazole 5 mg (because the patient was already on Seroquel).   40. Therapy: Continue with [facility].  41. Follow Up: 12 wks.     TREATMENT PLAN/GOALS:  Continue supportive psychotherapy efforts and medications as indicated. Treatment and medication options discussed during today's visit. Patient acknowledged and verbally consented to continue with current treatment plan and was educated on the importance of compliance with treatment and follow-up appointments.    MEDICATION ISSUES:  YASMANY reviewed as expected.  Discussed medication options and treatment plan of prescribed medication as well as the risks, benefits, and side effects including potential falls, possible impaired driving and metabolic adversities among others. Patient is agreeable to call the office with any worsening of symptoms or onset of side effects. Patient is agreeable to call 911 or go to the nearest ER should he/she begin having SI/HI. No medication side effects or related complaints today.     MEDS ORDERED DURING VISIT:  New Medications Ordered This Visit   Medications   • temazepam (RESTORIL) 30 MG capsule     Sig: Take 1 capsule by mouth At Night As Needed for Sleep.     Dispense:  30 capsule     Refill:  2   • QUEtiapine (SEROquel) 200 MG tablet     Sig: Take 1 tablet by mouth Every Night.     Dispense:  90 tablet     Refill:  0   • lamoTRIgine (LaMICtal) 200 MG tablet     Sig: Take 2 tablets by mouth every night at bedtime.     Dispense:  120 tablet     Refill:  2       Return in about 3 months (around 6/3/2022).         This document has been electronically signed by Stanley Quinones MD  March 3, 2022 08:40 EST      Part of this note may be an electronic transcription/translation of spoken language to printed text using the Dragon Dictation System.

## 2022-03-30 DIAGNOSIS — H66.005 RECURRENT ACUTE SUPPURATIVE OTITIS MEDIA WITHOUT SPONTANEOUS RUPTURE OF LEFT TYMPANIC MEMBRANE: ICD-10-CM

## 2022-03-30 RX ORDER — PROMETHAZINE HYDROCHLORIDE 25 MG/1
TABLET ORAL
Qty: 30 TABLET | Refills: 0 | Status: SHIPPED | OUTPATIENT
Start: 2022-03-30 | End: 2022-06-22

## 2022-03-30 RX ORDER — MONTELUKAST SODIUM 10 MG/1
TABLET ORAL
Qty: 90 TABLET | Refills: 0 | Status: SHIPPED | OUTPATIENT
Start: 2022-03-30 | End: 2022-06-28

## 2022-03-30 RX ORDER — OFLOXACIN 3 MG/ML
SOLUTION/ DROPS OPHTHALMIC
Qty: 5 ML | Refills: 0 | OUTPATIENT
Start: 2022-03-30

## 2022-03-31 RX ORDER — TIOTROPIUM BROMIDE INHALATION SPRAY 1.56 UG/1
SPRAY, METERED RESPIRATORY (INHALATION)
Qty: 4 G | Refills: 0 | Status: SHIPPED | OUTPATIENT
Start: 2022-03-31 | End: 2022-09-27

## 2022-04-04 ENCOUNTER — TELEPHONE (OUTPATIENT)
Dept: PSYCHIATRY | Facility: CLINIC | Age: 42
End: 2022-04-04

## 2022-04-06 ENCOUNTER — CLINICAL SUPPORT (OUTPATIENT)
Dept: FAMILY MEDICINE CLINIC | Facility: CLINIC | Age: 42
End: 2022-04-06

## 2022-04-06 DIAGNOSIS — Z30.42 ENCOUNTER FOR SURVEILLANCE OF INJECTABLE CONTRACEPTIVE: Primary | ICD-10-CM

## 2022-04-06 PROCEDURE — 96372 THER/PROPH/DIAG INJ SC/IM: CPT | Performed by: FAMILY MEDICINE

## 2022-04-06 RX ORDER — MEDROXYPROGESTERONE ACETATE 150 MG/ML
150 INJECTION, SUSPENSION INTRAMUSCULAR ONCE
Status: COMPLETED | OUTPATIENT
Start: 2022-04-06 | End: 2022-04-06

## 2022-04-06 RX ADMIN — MEDROXYPROGESTERONE ACETATE 150 MG: 150 INJECTION, SUSPENSION INTRAMUSCULAR at 11:01

## 2022-04-08 RX ORDER — VENLAFAXINE HYDROCHLORIDE 37.5 MG/1
37.5 CAPSULE, EXTENDED RELEASE ORAL DAILY
Qty: 30 CAPSULE | Refills: 1 | Status: SHIPPED | OUTPATIENT
Start: 2022-04-08 | End: 2022-08-30 | Stop reason: SDUPTHER

## 2022-06-22 ENCOUNTER — CLINICAL SUPPORT (OUTPATIENT)
Dept: FAMILY MEDICINE CLINIC | Facility: CLINIC | Age: 42
End: 2022-06-22

## 2022-06-22 DIAGNOSIS — F51.05 INSOMNIA DUE TO MENTAL CONDITION: ICD-10-CM

## 2022-06-22 DIAGNOSIS — Z30.9 ENCOUNTER FOR CONTRACEPTIVE MANAGEMENT, UNSPECIFIED TYPE: Primary | ICD-10-CM

## 2022-06-22 DIAGNOSIS — F41.1 GENERALIZED ANXIETY DISORDER: ICD-10-CM

## 2022-06-22 DIAGNOSIS — F31.74 BIPOLAR 1 DISORDER, MANIC, FULL REMISSION: ICD-10-CM

## 2022-06-22 PROCEDURE — 96372 THER/PROPH/DIAG INJ SC/IM: CPT | Performed by: FAMILY MEDICINE

## 2022-06-22 RX ORDER — QUETIAPINE FUMARATE 200 MG/1
TABLET, FILM COATED ORAL
Qty: 90 TABLET | Refills: 1 | Status: SHIPPED | OUTPATIENT
Start: 2022-06-22 | End: 2022-12-08 | Stop reason: SDUPTHER

## 2022-06-22 RX ORDER — HYDROXYZINE HYDROCHLORIDE 25 MG/1
TABLET, FILM COATED ORAL
Qty: 30 TABLET | Refills: 2 | Status: SHIPPED | OUTPATIENT
Start: 2022-06-22 | End: 2022-08-30 | Stop reason: SDUPTHER

## 2022-06-22 RX ORDER — TEMAZEPAM 30 MG/1
30 CAPSULE ORAL NIGHTLY PRN
Qty: 30 CAPSULE | Refills: 2 | OUTPATIENT
Start: 2022-06-22

## 2022-06-22 RX ORDER — PROMETHAZINE HYDROCHLORIDE 25 MG/1
TABLET ORAL
Qty: 30 TABLET | Refills: 0 | Status: SHIPPED | OUTPATIENT
Start: 2022-06-22 | End: 2022-06-28 | Stop reason: SDUPTHER

## 2022-06-22 RX ORDER — MEDROXYPROGESTERONE ACETATE 150 MG/ML
150 INJECTION, SUSPENSION INTRAMUSCULAR ONCE
Status: COMPLETED | OUTPATIENT
Start: 2022-06-22 | End: 2022-06-22

## 2022-06-22 RX ADMIN — MEDROXYPROGESTERONE ACETATE 150 MG: 150 INJECTION, SUSPENSION INTRAMUSCULAR at 11:00

## 2022-06-22 NOTE — TELEPHONE ENCOUNTER
Medication refill requests for Seroquel 200mg and Atarax 25mg. Called pt to schedule f/u appt, pt put on schedule for 08/02/2022. Pt also informed that she needed a refill on Restoril 30mg as well, added this medication to pended orders. Medication orders pended.

## 2022-06-28 ENCOUNTER — OFFICE VISIT (OUTPATIENT)
Dept: FAMILY MEDICINE CLINIC | Facility: CLINIC | Age: 42
End: 2022-06-28

## 2022-06-28 VITALS
DIASTOLIC BLOOD PRESSURE: 66 MMHG | TEMPERATURE: 99.3 F | SYSTOLIC BLOOD PRESSURE: 110 MMHG | BODY MASS INDEX: 20.25 KG/M2 | WEIGHT: 129 LBS | HEART RATE: 89 BPM | HEIGHT: 67 IN | OXYGEN SATURATION: 97 %

## 2022-06-28 DIAGNOSIS — K59.04 CHRONIC IDIOPATHIC CONSTIPATION: Primary | ICD-10-CM

## 2022-06-28 PROCEDURE — 99214 OFFICE O/P EST MOD 30 MIN: CPT | Performed by: FAMILY MEDICINE

## 2022-06-28 RX ORDER — PROMETHAZINE HYDROCHLORIDE 25 MG/1
25 TABLET ORAL DAILY
Qty: 30 TABLET | Refills: 0 | Status: SHIPPED | OUTPATIENT
Start: 2022-06-28 | End: 2022-07-28

## 2022-06-28 RX ORDER — EPINEPHRINE 0.3 MG/.3ML
0.3 INJECTION SUBCUTANEOUS ONCE
Qty: 2 EACH | Refills: 2 | Status: SHIPPED | OUTPATIENT
Start: 2022-06-28 | End: 2022-06-28

## 2022-06-28 RX ORDER — ALBUTEROL SULFATE 90 UG/1
1 AEROSOL, METERED RESPIRATORY (INHALATION) EVERY 4 HOURS PRN
Qty: 18 G | Refills: 5 | Status: SHIPPED | OUTPATIENT
Start: 2022-06-28 | End: 2022-07-28

## 2022-07-11 ENCOUNTER — TELEPHONE (OUTPATIENT)
Dept: FAMILY MEDICINE CLINIC | Facility: CLINIC | Age: 42
End: 2022-07-11

## 2022-07-11 NOTE — TELEPHONE ENCOUNTER
Caller: Elly Barrett    Relationship: Self    Best call back number: [phone]    What orders are you requesting (i.e. lab or imaging): IMAGING FOR X RAY     In what timeframe would the patient need to come in: ASAP     Where will you receive your lab/imaging services: [facility] [place]     Additional notes:PATIENT FELL AND HURT HER LEFT HIP ON Friday, July 08, 2022 PATIENT STATES THAT SHE CANNOT PUT WEIGHT ON IT DUE TO THE PAIN. PATIENT STATES THAT SHE ACCIDENTALLY TOOK MORE lamoTRIgine (LaMICtal) 200 MG tablet AND IT MADE HER DIZZY CAUSING HER TO FALL.         PATIENT IS ALSO REQUESTING THAT SOMEONE FROM THE CLINICAL STAFF TO PLEASE CALL HER BACK.

## 2022-07-25 DIAGNOSIS — H91.23 SUDDEN HEARING LOSS OF BOTH EARS: Primary | ICD-10-CM

## 2022-08-30 ENCOUNTER — OFFICE VISIT (OUTPATIENT)
Dept: PSYCHIATRY | Facility: CLINIC | Age: 42
End: 2022-08-30

## 2022-08-30 VITALS — BODY MASS INDEX: 18.49 KG/M2 | WEIGHT: 122 LBS | HEIGHT: 68 IN

## 2022-08-30 DIAGNOSIS — F31.74 BIPOLAR 1 DISORDER, MANIC, FULL REMISSION: Primary | ICD-10-CM

## 2022-08-30 DIAGNOSIS — F41.1 GENERALIZED ANXIETY DISORDER: ICD-10-CM

## 2022-08-30 DIAGNOSIS — F51.05 INSOMNIA DUE TO MENTAL CONDITION: ICD-10-CM

## 2022-08-30 PROCEDURE — 99214 OFFICE O/P EST MOD 30 MIN: CPT | Performed by: STUDENT IN AN ORGANIZED HEALTH CARE EDUCATION/TRAINING PROGRAM

## 2022-08-30 PROCEDURE — 90833 PSYTX W PT W E/M 30 MIN: CPT | Performed by: STUDENT IN AN ORGANIZED HEALTH CARE EDUCATION/TRAINING PROGRAM

## 2022-08-30 RX ORDER — HYDROXYZINE HYDROCHLORIDE 25 MG/1
25 TABLET, FILM COATED ORAL DAILY PRN
Qty: 30 TABLET | Refills: 2 | Status: SHIPPED | OUTPATIENT
Start: 2022-09-20 | End: 2022-12-08 | Stop reason: SDUPTHER

## 2022-08-30 RX ORDER — LAMOTRIGINE 200 MG/1
400 TABLET ORAL
Qty: 120 TABLET | Refills: 2 | Status: SHIPPED | OUTPATIENT
Start: 2022-08-30 | End: 2022-12-08 | Stop reason: SDUPTHER

## 2022-08-30 RX ORDER — TEMAZEPAM 30 MG/1
30 CAPSULE ORAL NIGHTLY PRN
Qty: 30 CAPSULE | Refills: 2 | Status: SHIPPED | OUTPATIENT
Start: 2022-09-14

## 2022-08-30 RX ORDER — PROMETHAZINE HYDROCHLORIDE 25 MG/1
25 TABLET ORAL DAILY PRN
COMMUNITY
Start: 2022-08-15 | End: 2022-12-08

## 2022-08-30 RX ORDER — VENLAFAXINE HYDROCHLORIDE 37.5 MG/1
37.5 CAPSULE, EXTENDED RELEASE ORAL DAILY
Qty: 30 CAPSULE | Refills: 1 | Status: SHIPPED | OUTPATIENT
Start: 2022-08-30 | End: 2022-09-27 | Stop reason: SDUPTHER

## 2022-08-30 NOTE — TREATMENT PLAN
Multi-Disciplinary Problems (from Behavioral Health Treatment Plan)    Active Problems     Problem: Anxiety  Start Date: 08/30/22    Problem Details: The patient self-scales this problem as a 4 with 10 being the worst.        Goal Priority Start Date Expected End Date End Date    Patient will develop and implement behavioral and cognitive strategies to reduce anxiety and irrational fears. -- 08/30/22 -- --    Goal Details: Progress toward goal:  Not appropriate to rate progress toward goal since this is the initial treatment plan.        Goal Intervention Frequency Start Date End Date    Help patient explore past emotional issues in relation to present anxiety. Q Month 08/30/22 --    Intervention Details: Duration of treatment until until remission of symptoms.        Goal Intervention Frequency Start Date End Date    Help patient develop an awareness of their cognitive and physical responses to anxiety. Q Month 08/30/22 --    Intervention Details: Duration of treatment until until remission of symptoms.              Problem: Depression  Start Date: 08/30/22    Problem Details: The patient self-scales this problem as a 4 with 10 being the worst.        Goal Priority Start Date Expected End Date End Date    Patient will demonstrate the ability to initiate new constructive life skills outside of sessions on a consistent basis. -- 08/30/22 -- --    Goal Details: Progress toward goal:  Not appropriate to rate progress toward goal since this is the initial treatment plan.        Goal Intervention Frequency Start Date End Date    Assist patient in setting attainable activities of daily living goals. PRN 08/30/22 --    Goal Intervention Frequency Start Date End Date    Provide education about depression Q Month 08/30/22 --    Intervention Details: Duration of treatment until until remission of symptoms.        Goal Intervention Frequency Start Date End Date    Assist patient in developing healthy coping strategies. Q Month  08/30/22 --    Intervention Details: Duration of treatment until until remission of symptoms.                    Reviewed By     Stanley Quinones MD 08/30/22 1032                 I have discussed and reviewed this treatment plan with the patient.

## 2022-08-30 NOTE — PATIENT INSTRUCTIONS
1.  Please return to clinic at your next scheduled visit.  Contact the clinic ([phone]) at least 24 hours prior in the event you need to cancel.  2.  Do no harm to yourself or others.    3.  Avoid alcohol and drugs.    4.  Take all medications as prescribed.  Please contact the clinic with any concerns. If you are in need of medication refills, please call the clinic at [phone].    5. Should you want to get in touch with your provider, Dr. Stanley Quinones, please utilize [email or web address] or contact the office ([phone]), and staff will be able to page Dr. Quinones directly.  6.  In the event you have personal crisis, contact the following crisis numbers: Suicide Prevention Hotline 1-972.160.1981; [facility] Helpline 1-395-706-OKGJ; [facility] Emergency Room [phone]; text HELLO to 272936; or 453.     SPECIFIC RECOMMENDATIONS:     1.      Medications discussed at this encounter:                   - Light box: Obtain a light box, 10,000 lux, put the box about 2-3 feet away from you, facing you, use it daily, right after waking up, for 30 minutes daily, don't stare directly at it. Read, eat, watch tv, etc. Call your insurance company to see if they can reimburse you for the cost.       2.      Psychotherapy recommendations:      3.     Return to clinic: 4 weeks

## 2022-08-30 NOTE — PROGRESS NOTES
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 development of a positive therapeutic relationship and encourage open, honest communication. Assisted patient in identifying risk factors which would indicate the need for higher level of care including thoughts to harm self or others and/or self-harming behavior and encouraged patient to contact this office, call 911, or present to the nearest emergency room should any of these events occur. Assisted patient in processing session content; acknowledged and normalized patient’s thoughts, feelings, and concerns by utilizing a person-centered approach in efforts to build appropriate rapport and a positive therapeutic relationship with open and honest communication. Patient given education on medication side effects, diagnosis/illness and relapse symptoms. Plan to continue supportive psychotherapy in next appointment to provide symptom relief.  Diagnoses: as above  Symptoms: as above  Functional status: Fair  Mental Status Exam: as above    Treatment plan: Medication management and supportive psychotherapy  Prognosis: Fair to good  Progress: Stable, possibly slightly worse  4 weeks, urgent    3/3: Doing well, euthymic. No SE. 16 minutes of supportive psychotherapy   3 mos    12/15: Stable. Now is on venlafaxine (?). 17 minutes of supportive psychotherapy8 wks    10/20: Doing well.  Some confusion regarding  medications.  Patient is taking Abilify when she should not be.  Patient is not taking venlafaxine anymore when she was supposed to be.  Otherwise she is stable.  See back in 8 weeks.  Medications adjusted. Refilled temazepam for 90 days total.    9/8: Doing well.  No changes.  Patient initially denied having temazepam, then when confronted by the information in the PDMP, reported that she actually did have it.  Patient has not been entirely truthful to me in the past regarding her medications.  This is something to be watchful about.  6 weeks.    8/9: Doing well.  Depression, anxiety and insomnia are under control.  No change to medications.  See back in 4 weeks.  Refill Restoril.  Patient has tried various sleep hygiene and stimulus control measures such as avoiding watching TV 2 hours before sleeping, avoiding using a tablet at night to avoid light exposure, and only using her bed for sleep, without success.  She has also tried relaxation therapy without success.    7/9: Patient is status post admission to [facility] and 4 days at the adult crisis stabilization unit April 2020. Anxiety is better, insomnia has resolved on the Seroquel.  Diagnosis is likely bipolar disorder.  Discontinue aripiprazole as the patient does not need to be on 2 antipsychotics.  Continue other medications without changes. Consider low dose gabapentin 100 mg tid for anxiety. Consider Seroquel. Follow-up in 4 wks. Continue psychotherapy with [facility].        PLAN:  49. Risk Assessment: Risk of self-harm acutely is high, not imminent. Risk factors include severe recent psychosocial stressor, mood disorder, access to weapons, history of self-harm and suicide attempt, recent self-harm, recent admission to inpatient for SI. Protective factors include no family history, no present SI, minimal AODA, healthcare seeking, future orientation, willingness to engage in care, support from her daughter. Risk of self-harm chronically is also high,  but could be further elevated in the event of treatment noncompliance and/or AODA.  50. Chronic Risk:   51. Safety: No acute safety concerns.  52. Medications:   a. CONTINUE melatonin 3 mg PO QHS. Risks, benefits, side effects discussed with patient including sedation, dizziness/falls risk, GI upset.  After discussion of these risks and benefits, the patient voiced understanding and agreed to proceed.   b. CONTINUE seroquel 200 mg PO QHS. Risks, benefits, alternatives discussed with patient including nausea and vomiting, GI upset, sedation, akathisia, hypotension, increased appetite, lowering of seizure threshold, theoretical risk of tardive dyskinesia, extrapyramidal symptoms, restless legs syndrome. After discussion of these risks and benefits, the patient voiced understanding and agreed to proceed.  c. CONTINUE venlafaxine 37.5 mg PO QDAY. Risks, benefits, alternatives discussed with patient including GI upset, nausea vomiting diarrhea, theoretical decrease of seizure threshold predisposing the patient to a slightly higher seizure risk, headaches, sexual dysfunction, serotonin syndrome, bleeding risk, increased suicidality in patients 24 years and younger.  Also constipation and urinary retention.  After discussion of these risks and benefits, the patient voiced understanding and agreed to proceed.  d. CONTINUE lamotrigine 400 mg p.o. daily. Risks, benefits, alternatives discussed with patient including rash, rebound depressive or manic symptoms if prompt discontinuation, GI upset, agitation, sedation.  After discussion of these risks and benefits, patient voiced understanding and agreed to proceed.   e. CONTINUE hydroxyzine 25 mg PO QDAY PRN anxiety. Risks, benefits, alternatives discussed with patient including sedation, dizziness, fall risk, GI upset.  After discussion of these risks and benefits, the patient voiced understanding and agreed to proceed.  f. CONTINUE temazepam 30 mg PO QHS. Risks, benefits,  alternatives discussed with patient including GI upset, sedation, dizziness, respiratory depression, falls risk.  After discussion of these risks and benefits, the patient voiced understanding and agreed to proceed. Yasmany ordered. UDS ordered. Controlled substances agreement verbally signed.  g. S/P   i. abilify (stopped because also on seroquel)  ii. trazodone for insomnia (no effect), mirtazapine (no effect), quetiapine (didn't take, now taking), eszopiclone (no effect), olanzapine, aripiprazole 5 mg (because the patient was already on Seroquel).   53. Therapy: Continue with [facility].  54. Follow Up: 4 wks.     TREATMENT PLAN/GOALS: Continue supportive psychotherapy efforts and medications as indicated. Treatment and medication options discussed during today's visit. Patient acknowledged and verbally consented to continue with current treatment plan and was educated on the importance of compliance with treatment and follow-up appointments.    MEDICATION ISSUES:  YASMANY reviewed as expected.  Discussed medication options and treatment plan of prescribed medication as well as the risks, benefits, and side effects including potential falls, possible impaired driving and metabolic adversities among others. Patient is agreeable to call the office with any worsening of symptoms or onset of side effects. Patient is agreeable to call 911 or go to the nearest ER should he/she begin having SI/HI. No medication side effects or related complaints today.     MEDS ORDERED DURING VISIT:  New Medications Ordered This Visit   Medications   • hydrOXYzine (ATARAX) 25 MG tablet     Sig: Take 1 tablet by mouth Daily As Needed for Anxiety.     Dispense:  30 tablet     Refill:  2   • lamoTRIgine (LaMICtal) 200 MG tablet     Sig: Take 2 tablets by mouth every night at bedtime.     Dispense:  120 tablet     Refill:  2   • venlafaxine XR (EFFEXOR-XR) 37.5 MG 24 hr capsule     Sig: Take 1 capsule by mouth Daily for 60 days. with food   Indications: Generalized Anxiety Disorder, Major Depressive Disorder     Dispense:  30 capsule     Refill:  1   • temazepam (RESTORIL) 30 MG capsule     Sig: Take 1 capsule by mouth At Night As Needed for Sleep.     Dispense:  30 capsule     Refill:  2       Return in about 4 weeks (around 9/27/2022) for urgent.         This document has been electronically signed by Stanley Quinones MD  August 30, 2022 10:42 EDT      Part of this note may be an electronic transcription/translation of spoken language to printed text using the Dragon Dictation System.

## 2022-09-07 ENCOUNTER — CLINICAL SUPPORT (OUTPATIENT)
Dept: FAMILY MEDICINE CLINIC | Facility: CLINIC | Age: 42
End: 2022-09-07

## 2022-09-07 DIAGNOSIS — Z30.013 ENCOUNTER FOR INITIAL PRESCRIPTION OF INJECTABLE CONTRACEPTIVE: Primary | ICD-10-CM

## 2022-09-07 PROCEDURE — 95115 IMMUNOTHERAPY ONE INJECTION: CPT | Performed by: FAMILY MEDICINE

## 2022-09-07 RX ORDER — MEDROXYPROGESTERONE ACETATE 150 MG/ML
150 INJECTION, SUSPENSION INTRAMUSCULAR ONCE
Status: COMPLETED | OUTPATIENT
Start: 2022-09-07 | End: 2022-09-07

## 2022-09-07 RX ADMIN — MEDROXYPROGESTERONE ACETATE 150 MG: 150 INJECTION, SUSPENSION INTRAMUSCULAR at 15:48

## 2022-09-13 RX ORDER — PROMETHAZINE HYDROCHLORIDE 25 MG/1
TABLET ORAL
Qty: 30 TABLET | Refills: 0 | OUTPATIENT
Start: 2022-09-13

## 2022-09-27 ENCOUNTER — OFFICE VISIT (OUTPATIENT)
Dept: PSYCHIATRY | Facility: CLINIC | Age: 42
End: 2022-09-27

## 2022-09-27 VITALS
SYSTOLIC BLOOD PRESSURE: 127 MMHG | BODY MASS INDEX: 18.49 KG/M2 | DIASTOLIC BLOOD PRESSURE: 90 MMHG | HEIGHT: 68 IN | WEIGHT: 122 LBS

## 2022-09-27 DIAGNOSIS — F31.74 BIPOLAR 1 DISORDER, MANIC, FULL REMISSION: Primary | ICD-10-CM

## 2022-09-27 DIAGNOSIS — F51.05 INSOMNIA DUE TO MENTAL CONDITION: ICD-10-CM

## 2022-09-27 DIAGNOSIS — F41.1 GENERALIZED ANXIETY DISORDER: ICD-10-CM

## 2022-09-27 PROCEDURE — 90833 PSYTX W PT W E/M 30 MIN: CPT | Performed by: STUDENT IN AN ORGANIZED HEALTH CARE EDUCATION/TRAINING PROGRAM

## 2022-09-27 PROCEDURE — 99214 OFFICE O/P EST MOD 30 MIN: CPT | Performed by: STUDENT IN AN ORGANIZED HEALTH CARE EDUCATION/TRAINING PROGRAM

## 2022-09-27 RX ORDER — VENLAFAXINE HYDROCHLORIDE 75 MG/1
75 CAPSULE, EXTENDED RELEASE ORAL DAILY
Qty: 30 CAPSULE | Refills: 2 | Status: SHIPPED | OUTPATIENT
Start: 2022-09-27 | End: 2022-12-08 | Stop reason: SDUPTHER

## 2022-09-27 NOTE — PATIENT INSTRUCTIONS
1.  Please return to clinic at your next scheduled visit.  Contact the clinic ([phone]) at least 24 hours prior in the event you need to cancel.  2.  Do no harm to yourself or others.    3.  Avoid alcohol and drugs.    4.  Take all medications as prescribed.  Please contact the clinic with any concerns. If you are in need of medication refills, please call the clinic at [phone].    5. Should you want to get in touch with your provider, Dr. Stanley Quinones, please utilize [employer] or contact the office ([phone]), and staff will be able to page Dr. Quinones directly.  6.  In the event you have personal crisis, contact the following crisis numbers: Suicide Prevention Hotline 1-748.276.5762; [facility] Helpline 1-906-689-JFMB; [facility] Emergency Room [phone]; text HELLO to 862143; or 575.     SPECIFIC RECOMMENDATIONS:     1.      Medications discussed at this encounter:                   - increase effexor     2.      Psychotherapy recommendations:      3.     Return to clinic: 4 weeks

## 2022-09-27 NOTE — PROGRESS NOTES
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 supportive psychotherapy in next appointment to provide symptom relief.  Diagnoses: as above  Symptoms: as above  Functional status: good  Mental Status Exam: as above    Treatment plan: Medication management and supportive psychotherapy  Prognosis: good  Progress: worsening anxiety  6 wks      8/30: No change to medications at this time.  The next visit.  Close follow-up as patient may get worse although her scores indicate that she is doing quite well at this time.  17 minutes of supportiveTreatment plan: Medication management and supportive psychotherapy  Prognosis: Fair to good  Progress: Stable, possibly slightly worse  4 weeks, urgent    3/3: Doing well, euthymic. No SE. 16 minutes of supportive psychotherapy   3 mos    12/15: Stable. Now is on venlafaxine (?). 17 minutes of supportive psychotherapy8 wks    10/20: Doing well.  Some confusion regarding medications.  Patient is taking Abilify when she should not be.  Patient is not taking venlafaxine anymore when she was supposed to be.  Otherwise she is stable.  See back in 8 weeks.  Medications adjusted. Refilled temazepam for 90 days total.    9/8: Doing well.  No changes.  Patient initially denied having temazepam, then when confronted by the information in the PDMP, reported that she actually did have it.  Patient has not been  entirely truthful to me in the past regarding her medications.  This is something to be watchful about.  6 weeks.    8/9: Doing well.  Depression, anxiety and insomnia are under control.  No change to medications.  See back in 4 weeks.  Refill Restoril.  Patient has tried various sleep hygiene and stimulus control measures such as avoiding watching TV 2 hours before sleeping, avoiding using a tablet at night to avoid light exposure, and only using her bed for sleep, without success.  She has also tried relaxation therapy without success.    7/9: Patient is status post admission [facility] and 4 days at the adult crisis stabilization unit April 2020. Anxiety is better, insomnia has resolved on the Seroquel.  Diagnosis is likely bipolar disorder.  Discontinue aripiprazole as the patient does not need to be on 2 antipsychotics.  Continue other medications without changes. Consider low dose gabapentin 100 mg tid for anxiety. Consider Seroquel. Follow-up in 4 wks. Continue psychotherapy with [facility].        PLAN:  62. Risk Assessment: Risk of self-harm acutely is high, not imminent. Risk factors include severe recent psychosocial stressor, mood disorder, access to weapons, history of self-harm and suicide attempt, recent self-harm, recent admission to inpatient for SI. Protective factors include no family history, no present SI, minimal AODA, healthcare seeking, future orientation, willingness to engage in care, support from her daughter. Risk of self-harm chronically is also high, but could be further elevated in the event of treatment noncompliance and/or AODA.  63. Chronic Risk:   64. Safety: No acute safety concerns.  65. Medications:   a. CONTINUE melatonin 3 mg PO QHS. Risks, benefits, side effects discussed with patient including sedation, dizziness/falls risk, GI upset.  After discussion of these risks and benefits, the patient voiced understanding and agreed to proceed.   b. CONTINUE seroquel 200 mg PO  QHS. Risks, benefits, alternatives discussed with patient including nausea and vomiting, GI upset, sedation, akathisia, hypotension, increased appetite, lowering of seizure threshold, theoretical risk of tardive dyskinesia, extrapyramidal symptoms, restless legs syndrome. After discussion of these risks and benefits, the patient voiced understanding and agreed to proceed.  c. INCREASE venlafaxine 37.5 to 75 mg PO QDAY. Risks, benefits, alternatives discussed with patient including GI upset, nausea vomiting diarrhea, theoretical decrease of seizure threshold predisposing the patient to a slightly higher seizure risk, headaches, sexual dysfunction, serotonin syndrome, bleeding risk, increased suicidality in patients 24 years and younger.  Also constipation and urinary retention.  After discussion of these risks and benefits, the patient voiced understanding and agreed to proceed.  d. CONTINUE lamotrigine 400 mg p.o. daily. Risks, benefits, alternatives discussed with patient including rash, rebound depressive or manic symptoms if prompt discontinuation, GI upset, agitation, sedation.  After discussion of these risks and benefits, patient voiced understanding and agreed to proceed.   e. CONTINUE hydroxyzine 25 mg PO QDAY PRN anxiety. Risks, benefits, alternatives discussed with patient including sedation, dizziness, fall risk, GI upset.  After discussion of these risks and benefits, the patient voiced understanding and agreed to proceed.  f. CONTINUE temazepam 30 mg PO QHS. Risks, benefits, alternatives discussed with patient including GI upset, sedation, dizziness, respiratory depression, falls risk.  After discussion of these risks and benefits, the patient voiced understanding and agreed to proceed. Rick ordered. UDS ordered. Controlled substances agreement verbally signed.  g. S/P   i. abilify (stopped because also on seroquel)  ii. trazodone for insomnia (no effect), mirtazapine (no effect), quetiapine (didn't  take, now taking), eszopiclone (no effect), olanzapine, aripiprazole 5 mg (because the patient was already on Seroquel).   66. Therapy: Continue with [facility].  67. Follow Up: 4 wks.     TREATMENT PLAN/GOALS: Continue supportive psychotherapy efforts and medications as indicated. Treatment and medication options discussed during today's visit. Patient acknowledged and verbally consented to continue with current treatment plan and was educated on the importance of compliance with treatment and follow-up appointments.    MEDICATION ISSUES:  YAS[place]Y reviewed as expected.  Discussed medication options and treatment plan of prescribed medication as well as the risks, benefits, and side effects including potential falls, possible impaired driving and metabolic adversities among others. Patient is agreeable to call the office with any worsening of symptoms or onset of side effects. Patient is agreeable to call 911 or go to the nearest ER should he/she begin having SI/HI. No medication side effects or related complaints today.     MEDS ORDERED DURING VISIT:  New Medications Ordered This Visit   Medications   • venlafaxine XR (EFFEXOR-XR) 75 MG 24 hr capsule     Sig: Take 1 capsule by mouth Daily. with food  Indications: Generalized Anxiety Disorder, Major Depressive Disorder     Dispense:  30 capsule     Refill:  2       Return in about 4 weeks (around 10/25/2022).         This document has been electronically signed by Stanley Quinones MD  September 27, 2022 13:14 EDT      Part of this note may be an electronic transcription/translation of spoken language to printed text using the Dragon Dictation System.

## 2022-10-13 ENCOUNTER — OFFICE VISIT (OUTPATIENT)
Dept: FAMILY MEDICINE CLINIC | Facility: CLINIC | Age: 42
End: 2022-10-13

## 2022-10-13 ENCOUNTER — PRIOR AUTHORIZATION (OUTPATIENT)
Dept: PSYCHIATRY | Facility: CLINIC | Age: 42
End: 2022-10-13

## 2022-10-13 VITALS
DIASTOLIC BLOOD PRESSURE: 78 MMHG | SYSTOLIC BLOOD PRESSURE: 122 MMHG | HEART RATE: 83 BPM | HEIGHT: 67 IN | RESPIRATION RATE: 14 BRPM | TEMPERATURE: 98.3 F | BODY MASS INDEX: 19.15 KG/M2 | OXYGEN SATURATION: 99 % | WEIGHT: 122 LBS

## 2022-10-13 DIAGNOSIS — K59.00 CONSTIPATION, UNSPECIFIED CONSTIPATION TYPE: ICD-10-CM

## 2022-10-13 DIAGNOSIS — Z12.31 BREAST CANCER SCREENING BY MAMMOGRAM: Primary | ICD-10-CM

## 2022-10-13 DIAGNOSIS — E78.5 HYPERLIPIDEMIA, UNSPECIFIED HYPERLIPIDEMIA TYPE: ICD-10-CM

## 2022-10-13 DIAGNOSIS — R63.4 UNINTENTIONAL WEIGHT LOSS: ICD-10-CM

## 2022-10-13 PROCEDURE — 99214 OFFICE O/P EST MOD 30 MIN: CPT | Performed by: FAMILY MEDICINE

## 2022-10-13 NOTE — PROGRESS NOTES
Chief Complaint  Chief Complaint   Patient presents with   • Constipation     3 month f/u       HPI:  Elly Barrett presents to [facility]     Pt reports she was smoking almost 4 packs a day after 32 years and admits she was stealing her mom cigarettes when she was little. Pt reports she has not had a cigarette in 12 weeks.     Chronic constipation-patient reports that she has had chronic constipation for years and has taken multiple things to try to go including MiraLAX over-the-counter medications Linzess but reports she still has trouble having regular bowel movements.  I will be referring her to gastroenterology.    Unintentional weight loss-patient reports that she has been losing weight even though she has been trying to patient reports that she does eat but seems to still continue to lose weight.  Patient BMI today is 19.11.  Patient weight in 2022 was 132 and BMI at that time was 20.7.      Past History:  Medical History: has a past medical history of Abdominal pain, Acid reflux, ADHD, Alcohol abuse, Allergic, Allergies, Allergy, Anxiety, Asthma, Bipolar disorder (HCC), Borderline personality disorder (HCC), Chronic idiopathic constipation, Constipation, Depression, Epigastric pain, Frequent headaches, Hallucinations, Hemorrhoids, HL (hearing loss), Insomnia, Irritable bowel syndrome, Major depressive disorder, Microscopic hematuria, Migraines, Nausea and vomiting, Panic disorder, Psychiatric illness, PTSD (post-traumatic stress disorder), Seasonal allergies, Self-injurious behavior, Suicidal thoughts, Suicide attempt (HCC), and Tobacco use.   Surgical History: has a past surgical history that includes Breast Augmentation;  section (); Esophagogastroduodenoscopy (); and Breast surgery.   Family History: family history includes Alcohol abuse in her father; Arthritis in her father and another family member; Bipolar disorder in her maternal grandmother;  Depression in her maternal grandmother; Diabetes in her mother; Drug abuse in her father; Heart disease in her father and mother; Osteoporosis in her father and mother; Stroke in her father.   Social History: reports that she quit smoking about 2 months ago. Her smoking use included cigarettes. She has a 25.00 pack-year smoking history. She has never used smokeless tobacco. She reports that she does not currently use alcohol. She reports that she does not use drugs.  Immunization History   Administered Date(s) Administered   • COVID-19 (MODERNA) 1st, 2nd, 3rd Dose Only 04/14/2021, 04/14/2021, 05/13/2021, 05/13/2021   • Tdap 03/04/2014         Allergies: Aspirin, Cephalexin, Codeine, Egg phospholipids, Nsaids, and Penicillins     Medications:  Current Outpatient Medications on File Prior to Visit   Medication Sig Dispense Refill   • EPINEPHrine (EPIPEN) 0.3 MG/0.3ML solution auto-injector injection      • hydrOXYzine (ATARAX) 25 MG tablet Take 1 tablet by mouth Daily As Needed for Anxiety. 30 tablet 2   • ipratropium-albuterol (DUO-NEB) 0.5-2.5 mg/3 ml nebulizer Inhale 3 mL.     • lamoTRIgine (LaMICtal) 200 MG tablet Take 2 tablets by mouth every night at bedtime. 120 tablet 2   • Linzess 145 MCG capsule capsule TAKE 1 CAPSULE BY MOUTH ONCE DAILY ON AN EMPTY STOMACH AT LEAST 30 MINUTES BEFORE THE FIRST MEAL OF THE DAY FOR 90 DAYS 30 capsule 0   • medroxyPROGESTERone Acetate 150 MG/ML suspension prefilled syringe      • ProAir  (90 Base) MCG/ACT inhaler Inhale 1 puff Every 4 (Four) Hours As Needed.     • promethazine (PHENERGAN) 25 MG tablet Take 25 mg by mouth Daily As Needed.     • QUEtiapine (SEROquel) 200 MG tablet TAKE 1 TABLET BY MOUTH ONCE DAILY AT NIGHT 90 tablet 1   • temazepam (RESTORIL) 30 MG capsule Take 1 capsule by mouth At Night As Needed for Sleep. 30 capsule 2   • venlafaxine XR (EFFEXOR-XR) 75 MG 24 hr capsule Take 1 capsule by mouth Daily. with food  Indications: Generalized Anxiety Disorder,  "Major Depressive Disorder 30 capsule 2   • Xopenex 1.25 MG/3ML nebulizer solution Every 4 (Four) Hours As Needed.     • ofloxacin (OCUFLOX) 0.3 % ophthalmic solution INSTILL 1 DROP INTO LEFT EAR TWICE DAILY FOR 10 DAYS 5 mL 0     No current facility-administered medications on file prior to visit.        Health Maintenance Due   Topic Date Due   • Pneumococcal Vaccine 0-64 (1 - PCV) Never done   • HEPATITIS C SCREENING  Never done   • COVID-19 Vaccine (3 - Booster for Moderna series) 07/08/2021   • LIPID PANEL  04/07/2022       Vital Signs:   Vitals:    10/13/22 1016   BP: 122/78   BP Location: Right arm   Patient Position: Sitting   Pulse: 83   Resp: 14   Temp: 98.3 °F (36.8 °C)   SpO2: 99%   Weight: 55.3 kg (122 lb)   Height: 170.2 cm (67\")      Body mass index is 19.11 kg/m².     ROS:  Review of Systems   Constitutional: Negative for fatigue and fever.   HENT: Negative for congestion, ear pain and sinus pressure.    Respiratory: Negative for cough, chest tightness and shortness of breath.    Cardiovascular: Negative for chest pain, palpitations and leg swelling.   Gastrointestinal: Positive for constipation. Negative for abdominal pain and diarrhea.   Genitourinary: Negative for dysuria and frequency.   Neurological: Negative for speech difficulty, headache and confusion.   Psychiatric/Behavioral: Negative for agitation and behavioral problems.          Physical Exam  Vitals reviewed.   Constitutional:       Appearance: Normal appearance.   HENT:      Right Ear: Tympanic membrane normal.      Left Ear: Tympanic membrane normal.      Nose: Nose normal.   Eyes:      Extraocular Movements: Extraocular movements intact.      Conjunctiva/sclera: Conjunctivae normal.      Pupils: Pupils are equal, round, and reactive to light.   Cardiovascular:      Rate and Rhythm: Normal rate and regular rhythm.   Pulmonary:      Effort: Pulmonary effort is normal.      Breath sounds: Normal breath sounds.   Abdominal:      General: " Bowel sounds are normal.   Musculoskeletal:         General: Normal range of motion.      Cervical back: Normal range of motion.   Skin:     General: Skin is warm and dry.   Neurological:      General: No focal deficit present.      Mental Status: She is alert and oriented to person, place, and time.   Psychiatric:         Mood and Affect: Mood normal.         Behavior: Behavior normal.          Result Review   Comprehensive Metabolic Panel (01/18/2022 12:44)       Diagnoses and all orders for this visit:    1. Breast cancer screening by mammogram (Primary)  -     Mammo Screening Digital Tomosynthesis Bilateral With CAD; Future    2. Unintentional weight loss  -     Comprehensive Metabolic Panel  -     CBC Auto Differential  -     TSH  -     T3, Free    3. Hyperlipidemia, unspecified hyperlipidemia type  -     Lipid Panel    4. Constipation, unspecified constipation type  -     Ambulatory Referral to Gastroenterology          Smoking Cessation:    Elly Barrett  reports that she quit smoking about 2 months ago. Her smoking use included cigarettes. She has a 25.00 pack-year smoking history. She has never used smokeless tobacco.          Follow Up   Return in about 3 months (around 1/13/2023).  Patient was given instructions and counseling regarding her condition or for health maintenance advice. Please see specific information pulled into the AVS if appropriate.       Sofie Ku MD

## 2022-10-13 NOTE — TELEPHONE ENCOUNTER
[place] APPROVED FOR TEMAZEPAM 30MG FROM 10/13/2022 TO 04/12/2023.     BEHAVIORAL HEALTH - SCAN - TEMAZEPAM [place] APPROVAL, MEDIMPACT, 10/13/2022 (10/13/2022)

## 2022-10-28 RX ORDER — LEVALBUTEROL HYDROCHLORIDE 1.25 MG/3ML
SOLUTION RESPIRATORY (INHALATION)
Qty: 72 ML | Refills: 0 | Status: SHIPPED | OUTPATIENT
Start: 2022-10-28

## 2022-11-29 RX ORDER — MEDROXYPROGESTERONE ACETATE 150 MG/ML
INJECTION, SUSPENSION INTRAMUSCULAR
Qty: 1 ML | Refills: 0 | Status: SHIPPED | OUTPATIENT
Start: 2022-11-29 | End: 2023-02-02

## 2022-12-08 ENCOUNTER — TELEMEDICINE (OUTPATIENT)
Dept: PSYCHIATRY | Facility: CLINIC | Age: 42
End: 2022-12-08

## 2022-12-08 ENCOUNTER — CLINICAL SUPPORT (OUTPATIENT)
Dept: FAMILY MEDICINE CLINIC | Facility: CLINIC | Age: 42
End: 2022-12-08

## 2022-12-08 DIAGNOSIS — Z63.4 BEREAVEMENT: ICD-10-CM

## 2022-12-08 DIAGNOSIS — Z30.42 ENCOUNTER FOR SURVEILLANCE OF INJECTABLE CONTRACEPTIVE: Primary | ICD-10-CM

## 2022-12-08 DIAGNOSIS — F51.05 INSOMNIA DUE TO MENTAL CONDITION: ICD-10-CM

## 2022-12-08 DIAGNOSIS — F41.1 GENERALIZED ANXIETY DISORDER: ICD-10-CM

## 2022-12-08 DIAGNOSIS — F31.74 BIPOLAR 1 DISORDER, MANIC, FULL REMISSION: Primary | ICD-10-CM

## 2022-12-08 LAB
B-HCG UR QL: NEGATIVE
CHOLEST SERPL-MCNC: 196 MG/DL (ref 0–200)
EXPIRATION DATE: NORMAL
HDLC SERPL-MCNC: 73 MG/DL (ref 40–60)
INTERNAL NEGATIVE CONTROL: NORMAL
INTERNAL POSITIVE CONTROL: NORMAL
LDLC SERPL CALC-MCNC: 110 MG/DL (ref 0–100)
LDLC/HDLC SERPL: 1.49 {RATIO}
Lab: NORMAL
TRIGL SERPL-MCNC: 71 MG/DL (ref 0–150)
VLDLC SERPL-MCNC: 13 MG/DL (ref 5–40)

## 2022-12-08 PROCEDURE — 99214 OFFICE O/P EST MOD 30 MIN: CPT | Performed by: STUDENT IN AN ORGANIZED HEALTH CARE EDUCATION/TRAINING PROGRAM

## 2022-12-08 PROCEDURE — 80061 LIPID PANEL: CPT | Performed by: FAMILY MEDICINE

## 2022-12-08 PROCEDURE — 81025 URINE PREGNANCY TEST: CPT | Performed by: FAMILY MEDICINE

## 2022-12-08 PROCEDURE — 90833 PSYTX W PT W E/M 30 MIN: CPT | Performed by: STUDENT IN AN ORGANIZED HEALTH CARE EDUCATION/TRAINING PROGRAM

## 2022-12-08 PROCEDURE — 96372 THER/PROPH/DIAG INJ SC/IM: CPT | Performed by: FAMILY MEDICINE

## 2022-12-08 RX ORDER — PROMETHAZINE HYDROCHLORIDE 25 MG/1
TABLET ORAL
Qty: 30 TABLET | Refills: 0 | Status: SHIPPED | OUTPATIENT
Start: 2022-12-08 | End: 2023-04-04 | Stop reason: SDUPTHER

## 2022-12-08 RX ORDER — MONTELUKAST SODIUM 10 MG/1
10 TABLET ORAL NIGHTLY PRN
COMMUNITY
Start: 2022-11-30 | End: 2023-01-13

## 2022-12-08 RX ORDER — MEDROXYPROGESTERONE ACETATE 150 MG/ML
INJECTION, SUSPENSION INTRAMUSCULAR
Qty: 1 ML | Refills: 0 | OUTPATIENT
Start: 2022-12-08

## 2022-12-08 RX ORDER — LIDOCAINE 5 %
ADHESIVE PATCH, MEDICATED TOPICAL DAILY PRN
COMMUNITY
Start: 2022-11-30

## 2022-12-08 RX ORDER — HYDROXYZINE HYDROCHLORIDE 25 MG/1
25 TABLET, FILM COATED ORAL DAILY PRN
Qty: 90 TABLET | Refills: 1 | Status: SHIPPED | OUTPATIENT
Start: 2022-12-08

## 2022-12-08 RX ORDER — QUETIAPINE FUMARATE 200 MG/1
200 TABLET, FILM COATED ORAL NIGHTLY
Qty: 90 TABLET | Refills: 1 | Status: SHIPPED | OUTPATIENT
Start: 2022-12-08

## 2022-12-08 RX ORDER — LAMOTRIGINE 200 MG/1
400 TABLET ORAL
Qty: 120 TABLET | Refills: 2 | Status: SHIPPED | OUTPATIENT
Start: 2022-12-08

## 2022-12-08 RX ORDER — VENLAFAXINE HYDROCHLORIDE 75 MG/1
75 CAPSULE, EXTENDED RELEASE ORAL DAILY
Qty: 90 CAPSULE | Refills: 1 | Status: SHIPPED | OUTPATIENT
Start: 2022-12-08

## 2022-12-08 RX ORDER — MEDROXYPROGESTERONE ACETATE 150 MG/ML
150 INJECTION, SUSPENSION INTRAMUSCULAR ONCE
Status: COMPLETED | OUTPATIENT
Start: 2022-12-08 | End: 2022-12-08

## 2022-12-08 RX ADMIN — MEDROXYPROGESTERONE ACETATE 150 MG: 150 INJECTION, SUSPENSION INTRAMUSCULAR at 10:28

## 2022-12-08 SDOH — SOCIAL STABILITY - SOCIAL INSECURITY: DISSAPEARANCE AND DEATH OF FAMILY MEMBER: Z63.4

## 2022-12-08 NOTE — PROGRESS NOTES
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 Risks, benefits, alternatives discussed with patient including rash, rebound depressive or manic symptoms if prompt discontinuation, GI upset, agitation, sedation.  After discussion of these risks and benefits, patient voiced understanding and agreed to proceed.   e. CONTINUE hydroxyzine 25 mg PO QDAY PRN anxiety. Risks, benefits, alternatives discussed with patient including sedation, dizziness, fall risk, GI upset.  After discussion of these risks and benefits, the patient voiced understanding and agreed to proceed.  f. CONTINUE temazepam 30 mg PO QHS. Risks, benefits, alternatives discussed with patient including GI upset, sedation, dizziness, respiratory depression, falls risk.  After discussion of these risks and benefits, the patient voiced understanding and agreed to proceed. Rick ordered. UDS ordered. Controlled substances agreement verbally signed.  g. S/P   i. abilify (stopped because also on seroquel)  ii. trazodone for insomnia (no effect), mirtazapine (no effect), quetiapine (didn't take, now taking), eszopiclone (no effect), olanzapine, aripiprazole 5 mg (because the patient was already on Seroquel).   54. Therapy: Continue with [facility].  55. Follow Up: 3 mos    TREATMENT PLAN/GOALS: Continue supportive psychotherapy efforts and medications as  indicated. Treatment and medication options discussed during today's visit. Patient acknowledged and verbally consented to continue with current treatment plan and was educated on the importance of compliance with treatment and follow-up appointments.    MEDICATION ISSUES:  YASMANY reviewed as expected.  Discussed medication options and treatment plan of prescribed medication as well as the risks, benefits, and side effects including potential falls, possible impaired driving and metabolic adversities among others. Patient is agreeable to call the office with any worsening of symptoms or onset of side effects. Patient is agreeable to call 911 or go to the nearest ER should he/she begin having SI/HI. No medication side effects or related complaints today.     MEDS ORDERED DURING VISIT:  New Medications Ordered This Visit   Medications   • venlafaxine XR (EFFEXOR-XR) 75 MG 24 hr capsule     Sig: Take 1 capsule by mouth Daily. with food  Indications: Generalized Anxiety Disorder, Major Depressive Disorder     Dispense:  90 capsule     Refill:  1   • QUEtiapine (SEROquel) 200 MG tablet     Sig: Take 1 tablet by mouth Every Night.     Dispense:  90 tablet     Refill:  1   • lamoTRIgine (LaMICtal) 200 MG tablet     Sig: Take 2 tablets by mouth every night at bedtime.     Dispense:  120 tablet     Refill:  2   • hydrOXYzine (ATARAX) 25 MG tablet     Sig: Take 1 tablet by mouth Daily As Needed for Anxiety.     Dispense:  90 tablet     Refill:  1       Return in about 3 months (around 3/8/2023).         This document has been electronically signed by Stanley Quinones MD  December 8, 2022 11:10 EST      Part of this note may be an electronic transcription/translation of spoken language to printed text using the Dragon Dictation System.

## 2022-12-08 NOTE — PATIENT INSTRUCTIONS
1.  Please return to clinic at your next scheduled visit.  Contact the clinic ([phone]) at least 24 hours prior in the event you need to cancel.  2.  Do no harm to yourself or others.    3.  Avoid alcohol and drugs.    4.  Take all medications as prescribed.  Please contact the clinic with any concerns. If you are in need of medication refills, please call the clinic at [phone].    5. Should you want to get in touch with your provider, Dr. Stanley Quinones, please utilize [employer] or contact the office ([phone]), and staff will be able to page Dr. Quinones directly.  6.  In the event you have personal crisis, contact the following crisis numbers: Suicide Prevention Hotline 1-586.970.2347; [facility] Helpline 1-722-636-VEGM; [facility] Emergency Room [phone]; text HELLO to 808869; or 953.     SPECIFIC RECOMMENDATIONS:     1.      Medications discussed at this encounter:                   - no changes     2.      Psychotherapy recommendations:      3.     Return to clinic: 3 mos

## 2023-01-13 RX ORDER — MONTELUKAST SODIUM 10 MG/1
TABLET ORAL
Qty: 90 TABLET | Refills: 0 | Status: SHIPPED | OUTPATIENT
Start: 2023-01-13

## 2023-02-02 RX ORDER — MEDROXYPROGESTERONE ACETATE 150 MG/ML
INJECTION, SUSPENSION INTRAMUSCULAR
Qty: 1 ML | Refills: 0 | Status: SHIPPED | OUTPATIENT
Start: 2023-02-02

## 2023-03-15 ENCOUNTER — TELEPHONE (OUTPATIENT)
Dept: FAMILY MEDICINE CLINIC | Facility: CLINIC | Age: 43
End: 2023-03-15
Payer: COMMERCIAL

## 2023-03-21 ENCOUNTER — CLINICAL SUPPORT (OUTPATIENT)
Dept: FAMILY MEDICINE CLINIC | Facility: CLINIC | Age: 43
End: 2023-03-21
Payer: COMMERCIAL

## 2023-03-21 DIAGNOSIS — Z30.42 ENCOUNTER FOR SURVEILLANCE OF INJECTABLE CONTRACEPTIVE: Primary | ICD-10-CM

## 2023-03-21 RX ORDER — MEDROXYPROGESTERONE ACETATE 150 MG/ML
150 INJECTION, SUSPENSION INTRAMUSCULAR ONCE
Status: COMPLETED | OUTPATIENT
Start: 2023-03-21 | End: 2023-03-21

## 2023-03-21 RX ADMIN — MEDROXYPROGESTERONE ACETATE 150 MG: 150 INJECTION, SUSPENSION INTRAMUSCULAR at 11:27

## 2023-04-04 ENCOUNTER — OFFICE VISIT (OUTPATIENT)
Dept: FAMILY MEDICINE CLINIC | Facility: CLINIC | Age: 43
End: 2023-04-04
Payer: COMMERCIAL

## 2023-04-04 VITALS
TEMPERATURE: 98.5 F | SYSTOLIC BLOOD PRESSURE: 122 MMHG | HEART RATE: 87 BPM | BODY MASS INDEX: 21.5 KG/M2 | WEIGHT: 137 LBS | DIASTOLIC BLOOD PRESSURE: 70 MMHG | OXYGEN SATURATION: 98 % | HEIGHT: 67 IN

## 2023-04-04 DIAGNOSIS — H92.03 OTALGIA OF BOTH EARS: ICD-10-CM

## 2023-04-04 DIAGNOSIS — R11.2 NAUSEA AND VOMITING, UNSPECIFIED VOMITING TYPE: ICD-10-CM

## 2023-04-04 DIAGNOSIS — F31.70 BIPOLAR DISORDER IN FULL REMISSION, MOST RECENT EPISODE UNSPECIFIED TYPE: Primary | ICD-10-CM

## 2023-04-04 LAB
ALBUMIN SERPL-MCNC: 4.5 G/DL (ref 3.5–5.2)
ALBUMIN/GLOB SERPL: 1.7 G/DL
ALP SERPL-CCNC: 85 U/L (ref 39–117)
ALT SERPL W P-5'-P-CCNC: 16 U/L (ref 1–33)
ANION GAP SERPL CALCULATED.3IONS-SCNC: 7.4 MMOL/L (ref 5–15)
AST SERPL-CCNC: 18 U/L (ref 1–32)
BASOPHILS # BLD AUTO: 0.02 10*3/MM3 (ref 0–0.2)
BASOPHILS NFR BLD AUTO: 0.4 % (ref 0–1.5)
BILIRUB SERPL-MCNC: 0.3 MG/DL (ref 0–1.2)
BUN SERPL-MCNC: 15 MG/DL (ref 6–20)
BUN/CREAT SERPL: 17 (ref 7–25)
CALCIUM SPEC-SCNC: 9.6 MG/DL (ref 8.6–10.5)
CHLORIDE SERPL-SCNC: 108 MMOL/L (ref 98–107)
CO2 SERPL-SCNC: 22.6 MMOL/L (ref 22–29)
CREAT SERPL-MCNC: 0.88 MG/DL (ref 0.57–1)
DEPRECATED RDW RBC AUTO: 43.4 FL (ref 37–54)
EGFRCR SERPLBLD CKD-EPI 2021: 83.7 ML/MIN/1.73
EOSINOPHIL # BLD AUTO: 0.04 10*3/MM3 (ref 0–0.4)
EOSINOPHIL NFR BLD AUTO: 0.8 % (ref 0.3–6.2)
ERYTHROCYTE [DISTWIDTH] IN BLOOD BY AUTOMATED COUNT: 12.5 % (ref 12.3–15.4)
GLOBULIN UR ELPH-MCNC: 2.7 GM/DL
GLUCOSE SERPL-MCNC: 84 MG/DL (ref 65–99)
HCT VFR BLD AUTO: 35.9 % (ref 34–46.6)
HGB BLD-MCNC: 11.8 G/DL (ref 12–15.9)
IMM GRANULOCYTES # BLD AUTO: 0.02 10*3/MM3 (ref 0–0.05)
IMM GRANULOCYTES NFR BLD AUTO: 0.4 % (ref 0–0.5)
LYMPHOCYTES # BLD AUTO: 1.34 10*3/MM3 (ref 0.7–3.1)
LYMPHOCYTES NFR BLD AUTO: 27.1 % (ref 19.6–45.3)
MCH RBC QN AUTO: 31.6 PG (ref 26.6–33)
MCHC RBC AUTO-ENTMCNC: 32.9 G/DL (ref 31.5–35.7)
MCV RBC AUTO: 96.2 FL (ref 79–97)
MONOCYTES # BLD AUTO: 0.35 10*3/MM3 (ref 0.1–0.9)
MONOCYTES NFR BLD AUTO: 7.1 % (ref 5–12)
NEUTROPHILS NFR BLD AUTO: 3.18 10*3/MM3 (ref 1.7–7)
NEUTROPHILS NFR BLD AUTO: 64.2 % (ref 42.7–76)
NRBC BLD AUTO-RTO: 0 /100 WBC (ref 0–0.2)
PLATELET # BLD AUTO: 304 10*3/MM3 (ref 140–450)
PMV BLD AUTO: 10.4 FL (ref 6–12)
POTASSIUM SERPL-SCNC: 4.2 MMOL/L (ref 3.5–5.2)
PROT SERPL-MCNC: 7.2 G/DL (ref 6–8.5)
RBC # BLD AUTO: 3.73 10*6/MM3 (ref 3.77–5.28)
SODIUM SERPL-SCNC: 138 MMOL/L (ref 136–145)
TSH SERPL DL<=0.05 MIU/L-ACNC: 1.48 UIU/ML (ref 0.27–4.2)
WBC NRBC COR # BLD: 4.95 10*3/MM3 (ref 3.4–10.8)

## 2023-04-04 PROCEDURE — 80053 COMPREHEN METABOLIC PANEL: CPT | Performed by: FAMILY MEDICINE

## 2023-04-04 PROCEDURE — 85025 COMPLETE CBC W/AUTO DIFF WBC: CPT | Performed by: FAMILY MEDICINE

## 2023-04-04 PROCEDURE — 84443 ASSAY THYROID STIM HORMONE: CPT | Performed by: FAMILY MEDICINE

## 2023-04-04 RX ORDER — PROMETHAZINE HYDROCHLORIDE 25 MG/1
25 TABLET ORAL DAILY
Qty: 30 TABLET | Refills: 2 | Status: SHIPPED | OUTPATIENT
Start: 2023-04-04 | End: 2023-05-04

## 2023-04-04 NOTE — PROGRESS NOTES
Chief Complaint  Chief Complaint   Patient presents with   • Med Refill       HPI:  Elly Barrett presents to [facility]     The patient is feeling okay. She has gained a little weight since last time. She wants to go to the gym but she feels so tired.     Bipolar Disorder- She is still seeing Dr. Quinones for her Bipolar Disorder. She is talking to her daughter again but she is worried about her weight loss.  Pt is compliant on her medication.     She is up to date on her Pap smear. She is still waiting to have a colonoscopy for her bowel issues. She was told she was too young. She is due for a mammogram but is worried about it causing a rupture of her breast implant.     Otalgia- She is having some pain in her ears which she attributes to the weather. She has not smoked in almost 9 months.    Procedures     Past History:  Medical History: has a past medical history of Abdominal pain, Acid reflux, ADHD, Alcohol abuse, Allergic, Allergies, Allergy, Anxiety, Asthma, Bipolar disorder, Borderline personality disorder, Chronic idiopathic constipation, Constipation, Depression, Epigastric pain, Frequent headaches, Hallucinations, Hemorrhoids, HL (hearing loss), Insomnia, Irritable bowel syndrome, Major depressive disorder, Microscopic hematuria, Migraines, Nausea and vomiting, Panic disorder, Psychiatric illness, PTSD (post-traumatic stress disorder), Seasonal allergies, Self-injurious behavior, Suicidal thoughts, Suicide attempt, and Tobacco use.   Surgical History: has a past surgical history that includes Breast Augmentation;  section (); Esophagogastroduodenoscopy (); and Breast surgery.   Family History: family history includes Alcohol abuse in her father; Arthritis in her father and another family member; Bipolar disorder in her maternal grandmother; Depression in her maternal grandmother; Diabetes in her mother; Drug abuse in her father; Heart disease in her father  and mother; Osteoporosis in her father and mother; Stroke in her father.   Social History: reports that she quit smoking about 8 months ago. Her smoking use included cigarettes. She has a 25.00 pack-year smoking history. She has never used smokeless tobacco. She reports that she does not currently use alcohol. She reports that she does not use drugs.  Immunization History   Administered Date(s) Administered   • COVID-19 (MODERNA) 1st, 2nd, 3rd Dose Only 04/14/2021, 04/14/2021, 05/13/2021, 05/13/2021   • Tdap 03/04/2014         Allergies: Aspirin, Cephalexin, Codeine, Egg phospholipids, Nsaids, and Penicillins     Medications:  Current Outpatient Medications on File Prior to Visit   Medication Sig Dispense Refill   • EPINEPHrine (EPIPEN) 0.3 MG/0.3ML solution auto-injector injection      • hydrOXYzine (ATARAX) 25 MG tablet Take 1 tablet by mouth Daily As Needed for Anxiety. 90 tablet 1   • lamoTRIgine (LaMICtal) 200 MG tablet Take 2 tablets by mouth every night at bedtime. 120 tablet 2   • Lidoderm 5 % Daily As Needed.     • medroxyPROGESTERone Acetate 150 MG/ML suspension prefilled syringe INJECT 1 ML OF SUSPENSION INTO THE APPROPRIATE MUSCLE AS DIRECTED ONCE EVERY 3 MONTHS 1 mL 0   • montelukast (SINGULAIR) 10 MG tablet TAKE 1 TABLET BY MOUTH ONCE DAILY AT NIGHT 90 tablet 0   • ProAir  (90 Base) MCG/ACT inhaler Inhale 1 puff Every 4 (Four) Hours As Needed.     • QUEtiapine (SEROquel) 200 MG tablet Take 1 tablet by mouth Every Night. 90 tablet 1   • temazepam (RESTORIL) 30 MG capsule Take 1 capsule by mouth At Night As Needed for Sleep. (Patient taking differently: Take 1 capsule by mouth Every Night.) 30 capsule 2   • venlafaxine XR (EFFEXOR-XR) 75 MG 24 hr capsule Take 1 capsule by mouth Daily. with food  Indications: Generalized Anxiety Disorder, Major Depressive Disorder 90 capsule 1   • Xopenex 1.25 MG/3ML nebulizer solution USE 1 VIAL IN NEBULIZER THREE TIMES DAILY 72 mL 0   • ipratropium-albuterol  "(DUO-NEB) 0.5-2.5 mg/3 ml nebulizer Inhale 3 mL.     • Linzess 145 MCG capsule capsule TAKE 1 CAPSULE BY MOUTH ONCE DAILY ON AN EMPTY STOMACH AT LEAST 30 MINUTES BEFORE THE FIRST MEAL OF THE DAY FOR 90 DAYS (Patient taking differently: Daily As Needed.) 30 capsule 0     No current facility-administered medications on file prior to visit.        Health Maintenance Due   Topic Date Due   • Pneumococcal Vaccine 0-64 (1 - PCV) Never done   • HEPATITIS C SCREENING  Never done   • ANNUAL PHYSICAL  02/18/2023       Vital Signs:   Vitals:    04/04/23 0941   BP: 122/70   Pulse: 87   Temp: 98.5 °F (36.9 °C)   SpO2: 98%   Weight: 62.1 kg (137 lb)   Height: 170.2 cm (67\")      Body mass index is 21.46 kg/m².     ROS:  Review of Systems   Constitutional: Negative for fatigue and fever.   HENT: Negative for congestion, ear pain and sinus pressure.    Respiratory: Negative for cough, chest tightness and shortness of breath.    Cardiovascular: Negative for chest pain, palpitations and leg swelling.   Gastrointestinal: Negative for abdominal pain and diarrhea.   Genitourinary: Negative for dysuria and frequency.   Neurological: Negative for speech difficulty, headache and confusion.   Psychiatric/Behavioral: Negative for agitation and behavioral problems.          Physical Exam  Vitals reviewed.   Constitutional:       Appearance: Normal appearance.   HENT:      Right Ear: Tympanic membrane normal.      Left Ear: Tympanic membrane normal.      Nose: Nose normal.   Eyes:      Extraocular Movements: Extraocular movements intact.      Conjunctiva/sclera: Conjunctivae normal.      Pupils: Pupils are equal, round, and reactive to light.   Cardiovascular:      Rate and Rhythm: Normal rate and regular rhythm.   Pulmonary:      Effort: Pulmonary effort is normal.      Breath sounds: Normal breath sounds.   Abdominal:      General: Bowel sounds are normal.   Musculoskeletal:         General: Normal range of motion.      Cervical back: Normal " range of motion.   Skin:     General: Skin is warm and dry.   Neurological:      General: No focal deficit present.      Mental Status: She is alert and oriented to person, place, and time.   Psychiatric:         Mood and Affect: Mood normal.         Behavior: Behavior normal.          Result Review   Lipid Panel (12/08/2022 10:23)       Diagnoses and all orders for this visit:    1. Bipolar disorder in full remission, most recent episode unspecified type (Primary)  -     Comprehensive Metabolic Panel  -     CBC Auto Differential  -     TSH    2. Nausea and vomiting, unspecified vomiting type  -     promethazine (PHENERGAN) 25 MG tablet; Take 1 tablet by mouth Daily for 30 days.  Dispense: 30 tablet; Refill: 2    3. Otalgia of both ears      Health maintenance  - The patient will have blood work today.     Smoking Cessation:    Elly Barrett  reports that she quit smoking about 8 months ago. Her smoking use included cigarettes. She has a 25.00 pack-year smoking history. She has never used smokeless tobacco.          Follow Up   No follow-ups on file.  Patient was given instructions and counseling regarding her condition or for health maintenance advice. Please see specific information pulled into the AVS if appropriate.       Sofie Ku MD     Transcribed from ambient dictation for Sofie Ku MD by Lula Rodriguez.  04/04/23   12:39 EDT    Patient or patient representative verbalized consent to the visit recording.  I have personally performed the services described in this document as transcribed by the above individual, and it is both accurate and complete.

## 2023-05-22 ENCOUNTER — TELEPHONE (OUTPATIENT)
Dept: FAMILY MEDICINE CLINIC | Facility: CLINIC | Age: 43
End: 2023-05-22

## 2023-05-22 NOTE — TELEPHONE ENCOUNTER
Caller: Elly Barrett    Relationship: Self    Best call back number: [phone]    What is the best time to reach you: ANYTIME    Who are you requesting to speak with (clinical staff, provider,  specific staff member): CLINICAL      What was the call regarding: THE PATIENT HAS BEEN WAKING UP THE LAST SEVERAL DAYS WITH BRUISES. SHE WOULD LIKE A CALL BACK TO DISCUSS WITH NURSE    Do you require a callback: YES

## 2023-05-22 NOTE — TELEPHONE ENCOUNTER
HUB TO READ    Patient to call and make an apt with Dr. Ku or any provider who has an available opening to discuss her concerns.

## 2023-06-16 DIAGNOSIS — F31.74 BIPOLAR 1 DISORDER, MANIC, FULL REMISSION: ICD-10-CM

## 2023-06-16 RX ORDER — QUETIAPINE FUMARATE 200 MG/1
TABLET, FILM COATED ORAL
Qty: 90 TABLET | Refills: 0 | Status: SHIPPED | OUTPATIENT
Start: 2023-06-16

## 2023-06-16 NOTE — TELEPHONE ENCOUNTER
Medication refill request for Quetiapine 200mg.     QUEtiapine (SEROquel) 200 MG tablet (12/08/2022)    Pt cancelled last appt in March and did not rs.     Called pt and scheduled for next available in-person appt.  Appointment with Stanley Quinones MD (08/01/2023)    Medication order pended.

## 2023-06-19 RX ORDER — MEDROXYPROGESTERONE ACETATE 150 MG/ML
INJECTION, SUSPENSION INTRAMUSCULAR
Qty: 1 ML | Refills: 0 | Status: SHIPPED | OUTPATIENT
Start: 2023-06-19

## 2023-10-10 RX ORDER — MEDROXYPROGESTERONE ACETATE 150 MG/ML
150 INJECTION, SUSPENSION INTRAMUSCULAR
Qty: 1 ML | Refills: 3 | Status: SHIPPED | OUTPATIENT
Start: 2023-10-10

## 2023-10-10 RX ORDER — EPINEPHRINE 0.3 MG/.3ML
0.3 INJECTION SUBCUTANEOUS ONCE
Qty: 2 EACH | Refills: 1 | Status: SHIPPED | OUTPATIENT
Start: 2023-10-10 | End: 2023-10-10

## 2023-10-10 RX ORDER — PROMETHAZINE HYDROCHLORIDE 25 MG/1
25 TABLET ORAL EVERY 8 HOURS PRN
Qty: 20 TABLET | Refills: 0 | Status: SHIPPED | OUTPATIENT
Start: 2023-10-10 | End: 2023-10-20

## 2023-10-11 ENCOUNTER — CLINICAL SUPPORT (OUTPATIENT)
Dept: FAMILY MEDICINE CLINIC | Facility: CLINIC | Age: 43
End: 2023-10-11
Payer: COMMERCIAL

## 2023-10-11 DIAGNOSIS — Z30.41 ENCOUNTER FOR BIRTH CONTROL PILLS MAINTENANCE: Primary | ICD-10-CM

## 2023-10-11 RX ORDER — MEDROXYPROGESTERONE ACETATE 150 MG/ML
150 INJECTION, SUSPENSION INTRAMUSCULAR ONCE
Status: COMPLETED | OUTPATIENT
Start: 2023-10-11 | End: 2023-10-11

## 2023-10-11 RX ADMIN — MEDROXYPROGESTERONE ACETATE 150 MG: 150 INJECTION, SUSPENSION INTRAMUSCULAR at 10:42

## 2023-12-03 RX ORDER — PROMETHAZINE HYDROCHLORIDE 25 MG/1
TABLET ORAL
Qty: 20 TABLET | Refills: 0 | Status: SHIPPED | OUTPATIENT
Start: 2023-12-03

## 2023-12-15 ENCOUNTER — OFFICE VISIT (OUTPATIENT)
Dept: FAMILY MEDICINE CLINIC | Facility: CLINIC | Age: 43
End: 2023-12-15
Payer: COMMERCIAL

## 2023-12-15 ENCOUNTER — PATIENT ROUNDING (BHMG ONLY) (OUTPATIENT)
Dept: FAMILY MEDICINE CLINIC | Facility: CLINIC | Age: 43
End: 2023-12-15
Payer: COMMERCIAL

## 2023-12-15 ENCOUNTER — TELEPHONE (OUTPATIENT)
Dept: FAMILY MEDICINE CLINIC | Facility: CLINIC | Age: 43
End: 2023-12-15

## 2023-12-15 ENCOUNTER — TELEPHONE (OUTPATIENT)
Dept: FAMILY MEDICINE CLINIC | Facility: CLINIC | Age: 43
End: 2023-12-15
Payer: COMMERCIAL

## 2023-12-15 VITALS
BODY MASS INDEX: 24.92 KG/M2 | OXYGEN SATURATION: 98 % | DIASTOLIC BLOOD PRESSURE: 72 MMHG | WEIGHT: 158.8 LBS | HEIGHT: 67 IN | HEART RATE: 84 BPM | SYSTOLIC BLOOD PRESSURE: 110 MMHG

## 2023-12-15 DIAGNOSIS — R22.41 FOOT MASS, RIGHT: ICD-10-CM

## 2023-12-15 DIAGNOSIS — F31.74 BIPOLAR 1 DISORDER, MANIC, FULL REMISSION: ICD-10-CM

## 2023-12-15 DIAGNOSIS — M65.312 TRIGGER FINGER OF LEFT THUMB: Primary | ICD-10-CM

## 2023-12-15 DIAGNOSIS — F41.1 GENERALIZED ANXIETY DISORDER: ICD-10-CM

## 2023-12-15 PROBLEM — F10.10 ALCOHOL ABUSE: Status: ACTIVE | Noted: 2023-12-15

## 2023-12-15 RX ORDER — VENLAFAXINE HYDROCHLORIDE 75 MG/1
75 CAPSULE, EXTENDED RELEASE ORAL DAILY
Qty: 90 CAPSULE | Refills: 1 | Status: SHIPPED | OUTPATIENT
Start: 2023-12-15

## 2023-12-15 RX ORDER — EPINEPHRINE 0.3 MG/.3ML
INJECTION SUBCUTANEOUS
COMMUNITY
Start: 2023-10-10

## 2023-12-15 RX ORDER — QUETIAPINE FUMARATE 50 MG/1
50 TABLET, FILM COATED ORAL NIGHTLY
Qty: 30 TABLET | Refills: 0 | Status: SHIPPED | OUTPATIENT
Start: 2023-12-15

## 2023-12-15 RX ORDER — LAMOTRIGINE 200 MG/1
400 TABLET ORAL
Qty: 180 TABLET | Refills: 1 | Status: SHIPPED | OUTPATIENT
Start: 2023-12-15

## 2023-12-15 RX ORDER — HYDROXYZINE HYDROCHLORIDE 25 MG/1
25 TABLET, FILM COATED ORAL DAILY PRN
Qty: 90 TABLET | Refills: 1 | Status: SHIPPED | OUTPATIENT
Start: 2023-12-15

## 2023-12-15 NOTE — TELEPHONE ENCOUNTER
Patient called [employer] to get Susie Shepard listed as her PCP and get a new card.  Reference #[id number]

## 2023-12-15 NOTE — TELEPHONE ENCOUNTER
Caller: Elly Barrett    Relationship to patient: Self    Best call back [phone]     REQUESTING TO SPEAK WITH DOCTOR DEBRA REGARDING HER CONCERN    PLEASE ADVISE

## 2023-12-15 NOTE — PROGRESS NOTES
Chief Complaint  left thumb pain, right foot knot on the inside, and Med Refill    Subjective        Elly Barrett presents to [facility]  History of Present Illness  Has a knot on the inner right foot. It only bothers her when she stands on her feet for any length. She noticed this 4 months ago.   Needs all of her medications refilled.   Hand Pain   There was no injury mechanism. Pain location: left thumb, trigger finger. The pain does not radiate. The pain is at a severity of 4/10. The pain is mild. The pain has been Intermittent since the incident. The symptoms are aggravated by movement. She has tried nothing for the symptoms. The treatment provided no relief.       I have reviewed the patient's medical history in detail and updated the computerized patient record.       Current Outpatient Medications:     EPINEPHrine (EPIPEN) 0.3 MG/0.3ML solution auto-injector injection, , Disp: , Rfl:     hydrOXYzine (ATARAX) 25 MG tablet, Take 1 tablet by mouth Daily As Needed for Anxiety., Disp: 90 tablet, Rfl: 1    lamoTRIgine (LaMICtal) 200 MG tablet, Take 2 tablets by mouth every night at bedtime., Disp: 180 tablet, Rfl: 1    medroxyPROGESTERone Acetate 150 MG/ML suspension prefilled syringe, Inject 1 mL into the appropriate muscle as directed by prescriber Every 3 (Three) Months., Disp: 1 mL, Rfl: 3    promethazine (PHENERGAN) 25 MG tablet, TAKE ONE TABLET BY MOUTH EVERY 8 HOURS AS NEEDED FOR NAUSEA AND VOMITING FOR UP TO 10 DAYS, Disp: 20 tablet, Rfl: 0    QUEtiapine (SEROquel) 50 MG tablet, Take 1 tablet by mouth Every Night., Disp: 30 tablet, Rfl: 0    venlafaxine XR (EFFEXOR-XR) 75 MG 24 hr capsule, Take 1 capsule by mouth Daily. with food  Indications: Generalized Anxiety Disorder, Major Depressive Disorder, Disp: 90 capsule, Rfl: 1    temazepam (RESTORIL) 30 MG capsule, Take 1 capsule by mouth At Night As Needed for Sleep., Disp: 30 capsule, Rfl: 2       Objective   Vital Signs:  BP  "110/72 (BP Location: Left arm, Patient Position: Sitting, Cuff Size: Adult)   Pulse 84   Ht 170.2 cm (67.01\")   Wt 72 kg (158 lb 12.8 oz)   SpO2 98%   BMI 24.87 kg/m²   Estimated body mass index is 24.87 kg/m² as calculated from the following:    Height as of this encounter: 170.2 cm (67.01\").    Weight as of this encounter: 72 kg (158 lb 12.8 oz).       BMI is within normal parameters. No other follow-up for BMI required.      Physical Exam  Vitals reviewed.   Constitutional:       Appearance: Normal appearance. She is normal weight.   Cardiovascular:      Rate and Rhythm: Normal rate and regular rhythm.      Pulses: Normal pulses.      Heart sounds: Normal heart sounds.   Pulmonary:      Effort: Pulmonary effort is normal.      Breath sounds: Normal breath sounds.   Musculoskeletal:         General: Normal range of motion.   Skin:     General: Skin is warm and dry.   Neurological:      Mental Status: She is alert and oriented to person, place, and time.   Psychiatric:      Comments: No acute distress        Result Review :                   Assessment and Plan   Diagnoses and all orders for this visit:    1. Trigger finger of left thumb (Primary)  -     Ambulatory Referral to Hand Surgery    2. Bipolar 1 disorder, manic, full remission  -     lamoTRIgine (LaMICtal) 200 MG tablet; Take 2 tablets by mouth every night at bedtime.  Dispense: 180 tablet; Refill: 1  -     venlafaxine XR (EFFEXOR-XR) 75 MG 24 hr capsule; Take 1 capsule by mouth Daily. with food  Indications: Generalized Anxiety Disorder, Major Depressive Disorder  Dispense: 90 capsule; Refill: 1  -     QUEtiapine (SEROquel) 50 MG tablet; Take 1 tablet by mouth Every Night.  Dispense: 30 tablet; Refill: 0    3. Generalized anxiety disorder  -     venlafaxine XR (EFFEXOR-XR) 75 MG 24 hr capsule; Take 1 capsule by mouth Daily. with food  Indications: Generalized Anxiety Disorder, Major Depressive Disorder  Dispense: 90 capsule; Refill: 1  -     " hydrOXYzine (ATARAX) 25 MG tablet; Take 1 tablet by mouth Daily As Needed for Anxiety.  Dispense: 90 tablet; Refill: 1    4. Foot mass, right  -     Ambulatory Referral to Podiatry    Ms. Barrett presents today to establish care at this practice.   I have refilled her medications as noted above. She will need to go to OB/GYN to get her birth control. She will need to return in 4 weeks to do a CSA/CSE appointment to get her Temazepam refilled.   I am referring her to hand surgery for her left thumb trigger finger and also to podiatry for the knot on her right foot.          Follow Up   Return in about 4 weeks (around 1/12/2024), or if symptoms worsen or fail to improve, for CSE/CSA/UDT for restoril.  Patient was given instructions and counseling regarding her condition or for health maintenance advice. Please see specific information pulled into the AVS if appropriate.

## 2023-12-22 DIAGNOSIS — M65.312 TRIGGER FINGER OF LEFT THUMB: Primary | ICD-10-CM

## 2024-01-12 ENCOUNTER — TELEPHONE (OUTPATIENT)
Dept: OBSTETRICS AND GYNECOLOGY | Facility: CLINIC | Age: 44
End: 2024-01-12
Payer: COMMERCIAL

## 2024-04-18 ENCOUNTER — OFFICE VISIT (OUTPATIENT)
Dept: FAMILY MEDICINE CLINIC | Facility: CLINIC | Age: 44
End: 2024-04-18
Payer: COMMERCIAL

## 2024-04-18 VITALS
DIASTOLIC BLOOD PRESSURE: 68 MMHG | OXYGEN SATURATION: 100 % | WEIGHT: 155.1 LBS | SYSTOLIC BLOOD PRESSURE: 112 MMHG | TEMPERATURE: 99.1 F | HEART RATE: 108 BPM | HEIGHT: 67 IN | BODY MASS INDEX: 24.34 KG/M2

## 2024-04-18 DIAGNOSIS — M65.312 TRIGGER FINGER OF LEFT THUMB: ICD-10-CM

## 2024-04-18 DIAGNOSIS — R53.82 CHRONIC FATIGUE: ICD-10-CM

## 2024-04-18 DIAGNOSIS — F31.61 BIPOLAR DISORDER, CURRENT EPISODE MIXED, MILD: ICD-10-CM

## 2024-04-18 DIAGNOSIS — R11.2 NAUSEA AND VOMITING, UNSPECIFIED VOMITING TYPE: ICD-10-CM

## 2024-04-18 DIAGNOSIS — K21.01 GASTROESOPHAGEAL REFLUX DISEASE WITH ESOPHAGITIS AND HEMORRHAGE: ICD-10-CM

## 2024-04-18 DIAGNOSIS — K92.0 HEMATEMESIS WITH NAUSEA: Primary | ICD-10-CM

## 2024-04-18 DIAGNOSIS — F31.74 BIPOLAR 1 DISORDER, MANIC, FULL REMISSION: ICD-10-CM

## 2024-04-18 DIAGNOSIS — F41.1 GENERALIZED ANXIETY DISORDER: ICD-10-CM

## 2024-04-18 LAB
ALBUMIN SERPL-MCNC: 4.8 G/DL (ref 3.5–5.2)
ALBUMIN/GLOB SERPL: 1.7 G/DL
ALP SERPL-CCNC: 111 U/L (ref 39–117)
ALT SERPL W P-5'-P-CCNC: 14 U/L (ref 1–33)
ANION GAP SERPL CALCULATED.3IONS-SCNC: 15 MMOL/L (ref 5–15)
AST SERPL-CCNC: 19 U/L (ref 1–32)
BASOPHILS # BLD AUTO: 0.03 10*3/MM3 (ref 0–0.2)
BASOPHILS NFR BLD AUTO: 0.5 % (ref 0–1.5)
BILIRUB SERPL-MCNC: 0.2 MG/DL (ref 0–1.2)
BUN SERPL-MCNC: 12 MG/DL (ref 6–20)
BUN/CREAT SERPL: 13.8 (ref 7–25)
CALCIUM SPEC-SCNC: 10 MG/DL (ref 8.6–10.5)
CHLORIDE SERPL-SCNC: 105 MMOL/L (ref 98–107)
CO2 SERPL-SCNC: 22 MMOL/L (ref 22–29)
CREAT SERPL-MCNC: 0.87 MG/DL (ref 0.57–1)
DEPRECATED RDW RBC AUTO: 44.5 FL (ref 37–54)
EGFRCR SERPLBLD CKD-EPI 2021: 84.4 ML/MIN/1.73
EOSINOPHIL # BLD AUTO: 0.05 10*3/MM3 (ref 0–0.4)
EOSINOPHIL NFR BLD AUTO: 0.8 % (ref 0.3–6.2)
ERYTHROCYTE [DISTWIDTH] IN BLOOD BY AUTOMATED COUNT: 13 % (ref 12.3–15.4)
FOLATE SERPL-MCNC: 17.7 NG/ML (ref 4.78–24.2)
GLOBULIN UR ELPH-MCNC: 2.8 GM/DL
GLUCOSE SERPL-MCNC: 77 MG/DL (ref 65–99)
HCT VFR BLD AUTO: 39.5 % (ref 34–46.6)
HGB BLD-MCNC: 13.1 G/DL (ref 12–15.9)
IMM GRANULOCYTES # BLD AUTO: 0.02 10*3/MM3 (ref 0–0.05)
IMM GRANULOCYTES NFR BLD AUTO: 0.3 % (ref 0–0.5)
IRON 24H UR-MRATE: 115 MCG/DL (ref 37–145)
IRON SATN MFR SERPL: 31 % (ref 20–50)
LYMPHOCYTES # BLD AUTO: 1.36 10*3/MM3 (ref 0.7–3.1)
LYMPHOCYTES NFR BLD AUTO: 21.9 % (ref 19.6–45.3)
MCH RBC QN AUTO: 31.3 PG (ref 26.6–33)
MCHC RBC AUTO-ENTMCNC: 33.2 G/DL (ref 31.5–35.7)
MCV RBC AUTO: 94.5 FL (ref 79–97)
MONOCYTES # BLD AUTO: 0.47 10*3/MM3 (ref 0.1–0.9)
MONOCYTES NFR BLD AUTO: 7.6 % (ref 5–12)
NEUTROPHILS NFR BLD AUTO: 4.27 10*3/MM3 (ref 1.7–7)
NEUTROPHILS NFR BLD AUTO: 68.9 % (ref 42.7–76)
NRBC BLD AUTO-RTO: 0 /100 WBC (ref 0–0.2)
PLATELET # BLD AUTO: 333 10*3/MM3 (ref 140–450)
PMV BLD AUTO: 10.4 FL (ref 6–12)
POTASSIUM SERPL-SCNC: 4.1 MMOL/L (ref 3.5–5.2)
PROT SERPL-MCNC: 7.6 G/DL (ref 6–8.5)
RBC # BLD AUTO: 4.18 10*6/MM3 (ref 3.77–5.28)
SODIUM SERPL-SCNC: 142 MMOL/L (ref 136–145)
TIBC SERPL-MCNC: 375 MCG/DL (ref 298–536)
TRANSFERRIN SERPL-MCNC: 252 MG/DL (ref 200–360)
VIT B12 BLD-MCNC: 582 PG/ML (ref 211–946)
WBC NRBC COR # BLD AUTO: 6.2 10*3/MM3 (ref 3.4–10.8)

## 2024-04-18 PROCEDURE — 85025 COMPLETE CBC W/AUTO DIFF WBC: CPT | Performed by: FAMILY MEDICINE

## 2024-04-18 PROCEDURE — 82607 VITAMIN B-12: CPT | Performed by: FAMILY MEDICINE

## 2024-04-18 PROCEDURE — 82746 ASSAY OF FOLIC ACID SERUM: CPT | Performed by: FAMILY MEDICINE

## 2024-04-18 PROCEDURE — 83013 H PYLORI (C-13) BREATH: CPT | Performed by: FAMILY MEDICINE

## 2024-04-18 PROCEDURE — 80053 COMPREHEN METABOLIC PANEL: CPT | Performed by: FAMILY MEDICINE

## 2024-04-18 PROCEDURE — 84466 ASSAY OF TRANSFERRIN: CPT | Performed by: FAMILY MEDICINE

## 2024-04-18 PROCEDURE — 83540 ASSAY OF IRON: CPT | Performed by: FAMILY MEDICINE

## 2024-04-18 RX ORDER — VENLAFAXINE HYDROCHLORIDE 75 MG/1
75 CAPSULE, EXTENDED RELEASE ORAL DAILY
Qty: 90 CAPSULE | Refills: 1 | Status: SHIPPED | OUTPATIENT
Start: 2024-04-18

## 2024-04-18 RX ORDER — PROMETHAZINE HYDROCHLORIDE 25 MG/1
25 TABLET ORAL EVERY 8 HOURS PRN
Qty: 20 TABLET | Refills: 0 | Status: SHIPPED | OUTPATIENT
Start: 2024-04-18 | End: 2024-04-28

## 2024-04-18 RX ORDER — LAMOTRIGINE 200 MG/1
400 TABLET ORAL
Qty: 180 TABLET | Refills: 1 | Status: SHIPPED | OUTPATIENT
Start: 2024-04-18

## 2024-04-18 RX ORDER — DEXLANSOPRAZOLE 60 MG/1
60 CAPSULE, DELAYED RELEASE ORAL DAILY
Qty: 90 CAPSULE | Refills: 3 | Status: SHIPPED | OUTPATIENT
Start: 2024-04-18 | End: 2024-07-17

## 2024-04-18 NOTE — PROGRESS NOTES
"Chief Complaint  Heartburn and Establish Care    Subjective        Elly Barrett presents to [facility]  History of Present Illness  The patient presents for evaluation of multiple medical concerns.    The patient reports a recurrence of acid reflux, characterized by hematemesis, which commenced approximately 2 months ago. She first noticed hematemesis for the first time yesterday. She describes the sensation of acid regurgitation during belching. Despite attempts at self-treatment with over-the-counter medications, she continues to experience the tasting blood. She has ceased consumption of coffee, soft drinks, and fried foods, opting instead for grilled chicken, salad, dressing, and water. She acknowledges a preference for ice, which exacerbates her throat discomfort, but drinking water does not. She last consumed food at 7:00 PM the previous night, but regurgitated it at 9:00 PM. She experienced diarrhea for a few days, which has since returned to normal. She experiences diarrhea approximately every 2 to 3 days, albeit less frequently than monthly. She also reports a cough, which she attributes to her acid reflux. She is requesting a pregnancy test. She denies experiencing abdominal pain, hematochezia, or melena. She consumes grilled chicken and avoids red meat due to gastrointestinal discomfort.    The patient reports fatigue, which she suspects may be due to vitamin B12 deficiency. She has previously tried vitamin B4 supplementation, which proved ineffective. She is requesting a vitamin B12 level check. She has previously tried Dexilant, which was effective.    Supplemental Information  She had a tendon release done by Dr. Redding on her left thumb. She can bend her left thumb and it does not hurt, but when she forces it, it stays there.   She has quit smoking.      Objective   Vital Signs:  /68   Pulse 108   Temp 99.1 °F (37.3 °C)   Ht 170.2 cm (67.01\")   Wt 70.4 kg " "(155 lb 1.6 oz)   SpO2 100%   BMI 24.28 kg/m²   Estimated body mass index is 24.28 kg/m² as calculated from the following:    Height as of this encounter: 170.2 cm (67.01\").    Weight as of this encounter: 70.4 kg (155 lb 1.6 oz).     BMI is within normal parameters. No other follow-up for BMI required.  Review of Systems   Constitutional:  Negative for activity change, chills, fatigue and fever.   HENT:  Negative for congestion, sinus pressure, sinus pain and sore throat.    Eyes:  Negative for discharge and redness.   Respiratory:  Negative for cough and chest tightness.    Cardiovascular:  Negative for chest pain, palpitations and leg swelling.   Gastrointestinal:  Negative for abdominal pain and diarrhea.   Endocrine: Positive for cold intolerance. Negative for heat intolerance.   Genitourinary:  Negative for difficulty urinating and dysuria.   Musculoskeletal:  Negative for gait problem and neck stiffness.   Skin:  Negative for pallor and rash.   Neurological:  Negative for dizziness and headaches.   Psychiatric/Behavioral:  Negative for agitation, behavioral problems and confusion.         Physical Exam  Vital Signs  The patient's blood pressure is 112/68. The patient's temperature is 99.1.    Physical Exam  Vitals reviewed.   Constitutional:       Appearance: Normal appearance.   HENT:      Right Ear: Tympanic membrane normal.      Left Ear: Tympanic membrane normal.      Nose: Nose normal.   Eyes:      Extraocular Movements: Extraocular movements intact.      Conjunctiva/sclera: Conjunctivae normal.      Pupils: Pupils are equal, round, and reactive to light.   Cardiovascular:      Rate and Rhythm: Normal rate and regular rhythm.   Pulmonary:      Effort: Pulmonary effort is normal.      Breath sounds: Normal breath sounds.   Abdominal:      General: Bowel sounds are normal.   Musculoskeletal:         General: Normal range of motion.      Cervical back: Normal range of motion.   Skin:     General: Skin is " warm and dry.   Neurological:      General: No focal deficit present.      Mental Status: She is alert and oriented to person, place, and time.   Psychiatric:         Mood and Affect: Mood normal.         Behavior: Behavior normal.        Result Review :  CBC Auto Differential (07/11/2023 10:57)     Results               Assessment and Plan     Diagnoses and all orders for this visit:    1. Hematemesis with nausea (Primary)  -     CBC Auto Differential  -     Iron Profile  -     Ambulatory Referral to Gastroenterology    2. Trigger finger of left thumb  -     Ambulatory Referral to Orthopedic Surgery    3. Gastroesophageal reflux disease with esophagitis and hemorrhage  -     H. Pylori Breath Test - Breath, Lung  -     Comprehensive Metabolic Panel  -     Ambulatory Referral to Gastroenterology  -     dexlansoprazole (Dexilant) 60 MG capsule; Take 1 capsule by mouth Daily for 90 days.  Dispense: 90 capsule; Refill: 3    4. Chronic fatigue  -     Vitamin B12 & Folate    5. Nausea and vomiting, unspecified vomiting type  -     POCT pregnancy, urine  -     POCT urinalysis dipstick, manual  -     promethazine (PHENERGAN) 25 MG tablet; Take 1 tablet by mouth Every 8 (Eight) Hours As Needed for Nausea or Vomiting for up to 10 days.  Dispense: 20 tablet; Refill: 0    6. Bipolar 1 disorder, manic, full remission  -     venlafaxine XR (EFFEXOR-XR) 75 MG 24 hr capsule; Take 1 capsule by mouth Daily. with food  Indications: Generalized Anxiety Disorder, Major Depressive Disorder  Dispense: 90 capsule; Refill: 1  -     lamoTRIgine (LaMICtal) 200 MG tablet; Take 2 tablets by mouth every night at bedtime.  Dispense: 180 tablet; Refill: 1    7. Generalized anxiety disorder  -     venlafaxine XR (EFFEXOR-XR) 75 MG 24 hr capsule; Take 1 capsule by mouth Daily. with food  Indications: Generalized Anxiety Disorder, Major Depressive Disorder  Dispense: 90 capsule; Refill: 1    8. Bipolar disorder, current episode mixed,  mild      Assessment & Plan  1. Hematemesis.  The patient's symptoms are indicative of heartburn. The patient was advised to reduce her intake of ice. An urgent referral will be made for the patient to consult with a gastroenterologist. A test for H. pylori will be conducted, along with a breath test. A Complete Blood Count (CBC) will be conducted. A standing urine pregnancy test will be conducted. A prescription for Dexilant will be issued. Should the patient's lab results indicate anemia, she will be contacted.    2. Bipolar disorder.  The patient was advised to schedule a follow-up appointment with her psychiatrist. A refill of the patient's Effexor, Lamictal, and Seroquel will be provided.    Follow-up  The patient is scheduled for a follow-up visit in 1 month.       I spent 35 minutes caring for Elly on this date of service. This time includes time spent by me in the following activities:reviewing tests  Follow Up    Return in about 4 weeks (around 5/16/2024).  Patient was given instructions and counseling regarding her condition or for health maintenance advice. Please see specific information pulled into the AVS if appropriate.   Patient or patient representative verbalized consent for the use of Ambient Listening during the visit with  Sofie Ku MD for chart documentation. 4/19/2024  18:21 EDT    Sofie Ku MD

## 2024-04-19 ENCOUNTER — PRIOR AUTHORIZATION (OUTPATIENT)
Dept: FAMILY MEDICINE CLINIC | Facility: CLINIC | Age: 44
End: 2024-04-19
Payer: COMMERCIAL

## 2024-04-19 NOTE — TELEPHONE ENCOUNTER
PA FOR DEXLANSOPRAZOLE WAS SENT TO [facility] 4/19/2024  WAITING ON RESPONSE.  NOTED.  PT NOTIFIED VIA VOICEMAIL

## 2024-04-20 LAB — UREA BREATH TEST QL: NEGATIVE

## 2024-04-22 ENCOUNTER — TELEPHONE (OUTPATIENT)
Dept: GASTROENTEROLOGY | Facility: CLINIC | Age: 44
End: 2024-04-22
Payer: COMMERCIAL

## 2024-04-22 ENCOUNTER — CLINICAL SUPPORT (OUTPATIENT)
Dept: FAMILY MEDICINE CLINIC | Facility: CLINIC | Age: 44
End: 2024-04-22
Payer: COMMERCIAL

## 2024-04-22 DIAGNOSIS — Z30.9 ENCOUNTER FOR CONTRACEPTIVE MANAGEMENT, UNSPECIFIED TYPE: Primary | ICD-10-CM

## 2024-04-22 LAB
B-HCG UR QL: NEGATIVE
EXPIRATION DATE: NORMAL
INTERNAL NEGATIVE CONTROL: NEGATIVE
INTERNAL POSITIVE CONTROL: POSITIVE
Lab: NORMAL

## 2024-04-22 PROCEDURE — 81025 URINE PREGNANCY TEST: CPT | Performed by: FAMILY MEDICINE

## 2024-04-22 PROCEDURE — 96372 THER/PROPH/DIAG INJ SC/IM: CPT | Performed by: FAMILY MEDICINE

## 2024-04-22 RX ORDER — MEDROXYPROGESTERONE ACETATE 150 MG/ML
150 INJECTION, SUSPENSION INTRAMUSCULAR ONCE
Status: COMPLETED | OUTPATIENT
Start: 2024-04-22 | End: 2024-04-22

## 2024-04-22 RX ADMIN — MEDROXYPROGESTERONE ACETATE 150 MG: 150 INJECTION, SUSPENSION INTRAMUSCULAR at 12:12

## 2024-04-22 NOTE — TELEPHONE ENCOUNTER
Received a Urgent referral. Diagnosis is Hematemesis with nausea and Gastroesophageal reflux disease with esophagitis and hemorrhage. Doctor stated that a Complete Blood Count (CBC) will be conducted. Can you get her in within the next weeks. Thank You.

## 2024-04-23 NOTE — TELEPHONE ENCOUNTER
Caller: Elly Barrett    Relationship to patient: Self    Best call back number: [phone]    Patient is needing: PATIENT CALLED IN AND IS REQUESTING A CALL BACK WITH STATUS OF REFERRAL TO DR. RENO AND ALSO IS REQUESTING A REFILL OF OLAFLAXIN EAR DROPS. PATIENT SAID SHE HAS WATER IN HER EARS EVERY SPRING AND SUMMER. PATIENT IS REQUESTING A CALL BACK PLEASE.      [id number] [place] [id number] [place] [phone] [facility] [phone]

## 2024-04-26 RX ORDER — OFLOXACIN 3 MG/ML
5 SOLUTION AURICULAR (OTIC) DAILY
Qty: 10 ML | Refills: 0 | Status: SHIPPED | OUTPATIENT
Start: 2024-04-26

## 2024-05-07 ENCOUNTER — TELEPHONE (OUTPATIENT)
Dept: FAMILY MEDICINE CLINIC | Facility: CLINIC | Age: 44
End: 2024-05-07

## 2024-05-07 NOTE — TELEPHONE ENCOUNTER
Caller: Elly Barrett    Relationship: Self    Best call back number: [phone]     What is the best time to reach you: ANYTIME    Who are you requesting to speak with (clinical staff, provider,  specific staff member): CLINICAL    What was the call regarding: PATIENT WAS TOLD TO RESCHEDULE 5.17 APPOINTMENT WITH PROVIDER DEBRA.     PATIENT WAS RESCHEDULED BY [facility] AND GIVEN FIRST AVAILABLE APPOINTMENT ON 7.12.2024.    PATIENT STATES SHE IS STILL UNABLE TO EAT ANYTHING BUT ICE. PATIENT HAS AN APPOINTMENT WITH GI IN OCTOBER BUT WAS TOLD SHE NEEDED TO BE SEEN SOONER BY DR RICHARDSON AND SENT REFERRAL TO  [facility] BUT HAS NOT HEARD ANYTHING UP TO THIS POINT.     PLEASE CALL TO UPDATE AND ADVISE.      Is it okay if the provider responds through [email or web address]: NO, CALL PREFERRED MAY LEAVE VOICEMAIL IF NO ANSWER.

## 2024-05-20 ENCOUNTER — PRIOR AUTHORIZATION (OUTPATIENT)
Dept: FAMILY MEDICINE CLINIC | Facility: CLINIC | Age: 44
End: 2024-05-20
Payer: COMMERCIAL

## 2024-05-20 ENCOUNTER — PATIENT MESSAGE (OUTPATIENT)
Dept: FAMILY MEDICINE CLINIC | Facility: CLINIC | Age: 44
End: 2024-05-20
Payer: COMMERCIAL

## 2024-05-21 ENCOUNTER — OFFICE VISIT (OUTPATIENT)
Dept: ORTHOPEDIC SURGERY | Facility: CLINIC | Age: 44
End: 2024-05-21
Payer: COMMERCIAL

## 2024-05-21 ENCOUNTER — PREP FOR SURGERY (OUTPATIENT)
Dept: OTHER | Facility: HOSPITAL | Age: 44
End: 2024-05-21
Payer: COMMERCIAL

## 2024-05-21 VITALS
SYSTOLIC BLOOD PRESSURE: 137 MMHG | DIASTOLIC BLOOD PRESSURE: 76 MMHG | HEART RATE: 80 BPM | HEIGHT: 67 IN | BODY MASS INDEX: 24.64 KG/M2 | OXYGEN SATURATION: 98 % | WEIGHT: 157 LBS

## 2024-05-21 DIAGNOSIS — M65.312 TRIGGER THUMB OF LEFT HAND: Primary | ICD-10-CM

## 2024-05-21 DIAGNOSIS — M65.312 TRIGGER THUMB OF LEFT HAND: ICD-10-CM

## 2024-05-21 DIAGNOSIS — M79.642 LEFT HAND PAIN: Primary | ICD-10-CM

## 2024-05-21 RX ORDER — CLINDAMYCIN PHOSPHATE 900 MG/50ML
900 INJECTION, SOLUTION INTRAVENOUS ONCE
OUTPATIENT
Start: 2024-05-21 | End: 2024-05-21

## 2024-05-21 NOTE — PROGRESS NOTES
"Chief Complaint  Initial Evaluation of the Left Hand     Subjective      Elly Barrett presents to [facility] for initial evaluation of the left hand.  She has triggering of the left thumb.  She notes decrease  and [facility] tasks.  She had a right trigger thumb release in 2019. She has had triggering for about a month.       Allergies   Allergen Reactions    Aspirin Hives    Cephalexin Hives    Codeine Hives    Egg Phospholipids Hives    Nsaids Hives    Penicillins Hives        Social History     Socioeconomic History    Marital status:    Tobacco Use    Smoking status: Former     Current packs/day: 0.00     Average packs/day: 1 pack/day for 25.0 years (25.0 ttl pk-yrs)     Types: Cigarettes     Start date: 1997     Quit date: 2022     Years since quittin.7    Smokeless tobacco: Never    Tobacco comments:     SMOKED 11-20 YEARS   Vaping Use    Vaping status: Former    Substances: Nicotine    Devices: Disposable   Substance and Sexual Activity    Alcohol use: Not Currently    Drug use: Never    Sexual activity: Yes        I reviewed the patient's chief complaint, history of present illness, review of systems, past medical history, surgical history, family history, social history, medications, and allergy list.     Review of Systems     Constitutional: Denies fevers, chills, weight loss  Cardiovascular: Denies chest pain, shortness of breath  Skin: Denies rashes, acute skin changes  Neurologic: Denies headache, loss of consciousness    Vital Signs:   /76   Pulse 80   Ht 170.2 cm (67.01\")   Wt 71.2 kg (157 lb)   SpO2 98%   BMI 24.58 kg/m²          Physical Exam  General: Alert. No acute distress    Ortho Exam        LEFT HAND Negative Compression testing/ Negative Tinels. NegativeFinkelsteins. Negative Rivas's testing. Negative [facility] grind testing. Negative Phalens. Full ROM of the hand, fingers, elbow and wrist. Positive Triggering of the left thumb  " Sensation grossly intact to light touch, median, radial and ulnar nerve. Positive AIN, PIN and ulnar nerve motor function intact. Axillary nerve intact. Positive pulses.  Tender in the thenar area.       Procedures      Imaging Results (Most Recent)       None             Result Review :               Assessment and Plan     Diagnoses and all orders for this visit:    1. Left hand pain (Primary)    2. Trigger thumb of left hand        Discussed the treatment plan with the patient.     Discussed the treatment options with the patient, operative vs non-operative. The patient expressed understanding and wished to proceed with a left trigger thumb release.        Discussed surgery., Risks/benefits discussed with patient including, but not limited to: infection, bleeding, neurovascular damage, re-rupture, aesthetic deformity, need for further surgery, and death., Surgery pamphlet given., and Call or return if worsening symptoms.    Follow Up     2 weeks postoperatively       Patient was given instructions and counseling regarding her condition or for health maintenance advice. Please see specific information pulled into the AVS if appropriate.     Scribed for Renan Redding MD by Sherice Galeas MA.  05/21/24   10:47 EDT    I have personally performed the services described in this document as scribed by the above individual and it is both accurate and complete. Renan Redding MD 05/21/24

## 2024-05-21 NOTE — H&P (VIEW-ONLY)
"Chief Complaint  Initial Evaluation of the Left Hand     Subjective      Elly Barrett presents to [facility] for initial evaluation of the left hand.  She has triggering of the left thumb.  She notes decrease  and [facility] tasks.  She had a right trigger thumb release in 2019. She has had triggering for about a month.       Allergies   Allergen Reactions    Aspirin Hives    Cephalexin Hives    Codeine Hives    Egg Phospholipids Hives    Nsaids Hives    Penicillins Hives        Social History     Socioeconomic History    Marital status:    Tobacco Use    Smoking status: Former     Current packs/day: 0.00     Average packs/day: 1 pack/day for 25.0 years (25.0 ttl pk-yrs)     Types: Cigarettes     Start date: 1997     Quit date: 2022     Years since quittin.7    Smokeless tobacco: Never    Tobacco comments:     SMOKED 11-20 YEARS   Vaping Use    Vaping status: Former    Substances: Nicotine    Devices: Disposable   Substance and Sexual Activity    Alcohol use: Not Currently    Drug use: Never    Sexual activity: Yes        I reviewed the patient's chief complaint, history of present illness, review of systems, past medical history, surgical history, family history, social history, medications, and allergy list.     Review of Systems     Constitutional: Denies fevers, chills, weight loss  Cardiovascular: Denies chest pain, shortness of breath  Skin: Denies rashes, acute skin changes  Neurologic: Denies headache, loss of consciousness    Vital Signs:   /76   Pulse 80   Ht 170.2 cm (67.01\")   Wt 71.2 kg (157 lb)   SpO2 98%   BMI 24.58 kg/m²          Physical Exam  General: Alert. No acute distress    Ortho Exam        LEFT HAND Negative Compression testing/ Negative Tinels. NegativeFinkelsteins. Negative Rivas's testing. Negative CMC grind testing. Negative Phalens. Full ROM of the hand, fingers, elbow and wrist. Positive Triggering of the left thumb  " Sensation grossly intact to light touch, median, radial and ulnar nerve. Positive AIN, PIN and ulnar nerve motor function intact. Axillary nerve intact. Positive pulses.  Tender in the thenar area.       Procedures      Imaging Results (Most Recent)       None             Result Review :               Assessment and Plan     Diagnoses and all orders for this visit:    1. Left hand pain (Primary)    2. Trigger thumb of left hand        Discussed the treatment plan with the patient.     Discussed the treatment options with the patient, operative vs non-operative. The patient expressed understanding and wished to proceed with a left trigger thumb release.        Discussed surgery., Risks/benefits discussed with patient including, but not limited to: infection, bleeding, neurovascular damage, re-rupture, aesthetic deformity, need for further surgery, and death., Surgery pamphlet given., and Call or return if worsening symptoms.    Follow Up     2 weeks postoperatively       Patient was given instructions and counseling regarding her condition or for health maintenance advice. Please see specific information pulled into the AVS if appropriate.     Scribed for Renan Redding MD by Sherice Galeas MA.  05/21/24   10:47 EDT    I have personally performed the services described in this document as scribed by the above individual and it is both accurate and complete. Renan Redding MD 05/21/24

## 2024-05-26 DIAGNOSIS — R11.2 NAUSEA AND VOMITING, UNSPECIFIED VOMITING TYPE: ICD-10-CM

## 2024-05-28 RX ORDER — PROMETHAZINE HYDROCHLORIDE 25 MG/1
TABLET ORAL
Qty: 20 TABLET | Refills: 0 | Status: SHIPPED | OUTPATIENT
Start: 2024-05-28

## 2024-05-31 NOTE — PRE-PROCEDURE INSTRUCTIONS
IMPORTANT INSTRUCTIONS - PRE-ADMISSION TESTING  DO NOT EAT OR CHEW anything after midnight the night before your procedure.    You may have CLEAR liquids up to __2_ hours prior to ARRIVAL time.   Take the following medications the morning of your procedure with JUST A SIP OF WATER:  _NO MEDICATIONS ______________________________________________________________________________________________________________________________________________________________________________________    DO NOT BRING your medications to the hospital with you, UNLESS something has changed since your PRE-Admission Testing appointment.  Hold all vitamins, supplements, and NSAIDS (Non- steroidal anti-inflammatory meds) for one week prior to surgery (you MAY take Tylenol or Acetaminophen).  If you are diabetic, check your blood sugar the morning of your procedure. If it is less than 70 or if you are feeling symptomatic, call the following number for further instructions: [phone]___.  Use your inhalers/nebulizers as usual, the morning of your procedure. BRING YOUR INHALERS with you.   Bring your CPAP or BIPAP to hospital, ONLY IF YOU WILL BE SPENDING THE NIGHT.   Make sure you have a ride home and have someone who will stay with you the day of your procedure after you go home.  If you have any questions, please call your Pre-Admission Testing Nurse, __GONZALEZ___ at [phone]___.   Per anesthesia request, do not smoke for 24 hours before your procedure or as instructed by your surgeon.      Clear Liquid Diet        Find out when you need to start a clear liquid diet.   Think of “clear liquids” as anything you could read a newspaper through. This includes things like water, broth, sports drinks, or tea WITHOUT any kind of milk or cream.           Once you are told to start a clear liquid diet, only drink these things until 2 hours before arrival to the hospital or when the hospital says to stop. Total volume limitation: 8 oz.       Clear  liquids you CAN drink:   Water   Clear broth: beef, chicken, vegetable, or bone broth with nothing in it   Gatorade   Lemonade or Beltran-aid   Soda   Tea, coffee (NO cream or honey)   Jell-O (without fruit)   Popsicles (without fruit or cream)   Italian ices   Juice without pulp: apple, white, grape   You may use salt, pepper, and sugar  NO RED LIQUIDS     Do NOT drink:   Milk or cream   Soy milk, almond milk, coconut milk, or other non-dairy drinks and   creamers   Milkshakes or smoothies   Tomato juice   Orange juice   Grapefruit juice   Cream soups or any other than broth         Clear Liquid Diet:  Do NOT eat any solid food.  Do NOT eat or suck on mints or candy.  Do NOT chew gum.  Do NOT drink thick liquids like milk or juice with pulp in it.  Do NOT add milk, cream, or anything like soy milk or almond milk to coffee or tea.       PREOPERATIVE (BEFORE SURGERY)              BATHING INSTRUCTIONS  Instructions:    You will need to shower 1  times utilizing the soap provided; at the times indicated   below:     6/5/24 MORNING OF SURGERY      Wash your hair and face with normal shampoo and soap, rinse it well before using the surgical soap.      In the shower, wet the skin completely with water from your neck to your feet. Apply the cleanser to your   body ONLY FROM THE NECK TO YOUR FEET.     Do NOT USE THE CLEANSER ON YOUR FACE, HEAD, OR GENITAL (PRIVATE) AREAS.   Keep it out of your eyes, ears, and mouth because of the risk of injury to those areas.      Scrub with a clean washcloth for each bath utilizing the soap provided from the top of your body to the   bottom starting at the neck area.      Pay close attention to your armpits, groin area, and the site of surgery.      Wash your body gently for 5 minutes. Stand outside the stream or turn off the water while scrubbing your   body. Do NOT wash with your regular soap after the surgical cleanser is used.      RINSE THE CLEANSER OFF COMPLETELY with plenty of water.  Rinse the area again thoroughly.      Dry off with a clean towel. The surgical soap can cause dryness; however do NOT APPLY LOTION,   CREAM, POWDER, and/or DEODORANT AFTER SHOWERING.     Be sure to where clean clothes after showering.      Ensure CLEAN BED LINENS AFTER FIRST wash with the surgical soap.      NO PETS ALLOWED IN THE BED with you after utilizing the surgical soap.

## 2024-06-05 ENCOUNTER — HOSPITAL ENCOUNTER (OUTPATIENT)
Facility: HOSPITAL | Age: 44
Setting detail: HOSPITAL OUTPATIENT SURGERY
Discharge: HOME | End: 2024-06-05
Attending: ORTHOPAEDIC SURGERY | Admitting: ORTHOPAEDIC SURGERY
Payer: COMMERCIAL

## 2024-06-05 ENCOUNTER — ANESTHESIA (OUTPATIENT)
Dept: PERIOP | Facility: HOSPITAL | Age: 44
End: 2024-06-05
Payer: COMMERCIAL

## 2024-06-05 ENCOUNTER — ANESTHESIA EVENT (OUTPATIENT)
Dept: PERIOP | Facility: HOSPITAL | Age: 44
End: 2024-06-05
Payer: COMMERCIAL

## 2024-06-05 VITALS
HEART RATE: 69 BPM | BODY MASS INDEX: 23.59 KG/M2 | HEIGHT: 68 IN | TEMPERATURE: 97.4 F | RESPIRATION RATE: 16 BRPM | DIASTOLIC BLOOD PRESSURE: 65 MMHG | WEIGHT: 155.65 LBS | OXYGEN SATURATION: 96 % | SYSTOLIC BLOOD PRESSURE: 95 MMHG

## 2024-06-05 DIAGNOSIS — M65.312 TRIGGER THUMB OF LEFT HAND: ICD-10-CM

## 2024-06-05 DIAGNOSIS — M65.312 TRIGGER FINGER OF LEFT THUMB: Primary | ICD-10-CM

## 2024-06-05 LAB — B-HCG UR QL: NEGATIVE

## 2024-06-05 PROCEDURE — 25010000002 PROPOFOL 500 MG/50ML EMULSION: Performed by: NURSE ANESTHETIST, CERTIFIED REGISTERED

## 2024-06-05 PROCEDURE — 25010000002 BUPIVACAINE (PF) 0.5 % SOLUTION: Performed by: ORTHOPAEDIC SURGERY

## 2024-06-05 PROCEDURE — 26055 INCISE FINGER TENDON SHEATH: CPT | Performed by: ORTHOPAEDIC SURGERY

## 2024-06-05 PROCEDURE — 81025 URINE PREGNANCY TEST: CPT | Performed by: ANESTHESIOLOGY

## 2024-06-05 PROCEDURE — 25010000002 MIDAZOLAM PER 1MG: Performed by: ANESTHESIOLOGY

## 2024-06-05 PROCEDURE — 25810000003 LACTATED RINGERS PER 1000 ML: Performed by: ANESTHESIOLOGY

## 2024-06-05 RX ORDER — ONDANSETRON 2 MG/ML
4 INJECTION INTRAMUSCULAR; INTRAVENOUS ONCE AS NEEDED
Status: DISCONTINUED | OUTPATIENT
Start: 2024-06-05 | End: 2024-06-05 | Stop reason: HOSPADM

## 2024-06-05 RX ORDER — OXYCODONE HYDROCHLORIDE 5 MG/1
5 TABLET ORAL
Status: DISCONTINUED | OUTPATIENT
Start: 2024-06-05 | End: 2024-06-05 | Stop reason: HOSPADM

## 2024-06-05 RX ORDER — ACETAMINOPHEN 500 MG
1000 TABLET ORAL ONCE
Status: COMPLETED | OUTPATIENT
Start: 2024-06-05 | End: 2024-06-05

## 2024-06-05 RX ORDER — PROMETHAZINE HYDROCHLORIDE 25 MG/1
25 SUPPOSITORY RECTAL ONCE AS NEEDED
Status: DISCONTINUED | OUTPATIENT
Start: 2024-06-05 | End: 2024-06-05 | Stop reason: HOSPADM

## 2024-06-05 RX ORDER — PROPOFOL 10 MG/ML
INJECTION, EMULSION INTRAVENOUS CONTINUOUS PRN
Status: DISCONTINUED | OUTPATIENT
Start: 2024-06-05 | End: 2024-06-05 | Stop reason: SURG

## 2024-06-05 RX ORDER — SODIUM CHLORIDE 9 MG/ML
40 INJECTION, SOLUTION INTRAVENOUS AS NEEDED
Status: DISCONTINUED | OUTPATIENT
Start: 2024-06-05 | End: 2024-06-05 | Stop reason: HOSPADM

## 2024-06-05 RX ORDER — MIDAZOLAM HYDROCHLORIDE 2 MG/2ML
2 INJECTION, SOLUTION INTRAMUSCULAR; INTRAVENOUS ONCE
Status: COMPLETED | OUTPATIENT
Start: 2024-06-05 | End: 2024-06-05

## 2024-06-05 RX ORDER — MEPERIDINE HYDROCHLORIDE 25 MG/ML
12.5 INJECTION INTRAMUSCULAR; INTRAVENOUS; SUBCUTANEOUS
Status: DISCONTINUED | OUTPATIENT
Start: 2024-06-05 | End: 2024-06-05 | Stop reason: HOSPADM

## 2024-06-05 RX ORDER — BUPIVACAINE HYDROCHLORIDE 5 MG/ML
INJECTION, SOLUTION EPIDURAL; INTRACAUDAL AS NEEDED
Status: DISCONTINUED | OUTPATIENT
Start: 2024-06-05 | End: 2024-06-05 | Stop reason: HOSPADM

## 2024-06-05 RX ORDER — DEXMEDETOMIDINE HYDROCHLORIDE 100 UG/ML
INJECTION, SOLUTION INTRAVENOUS AS NEEDED
Status: DISCONTINUED | OUTPATIENT
Start: 2024-06-05 | End: 2024-06-05 | Stop reason: SURG

## 2024-06-05 RX ORDER — HYDROCODONE BITARTRATE AND ACETAMINOPHEN 7.5; 325 MG/1; MG/1
1 TABLET ORAL ONCE AS NEEDED
Status: DISCONTINUED | OUTPATIENT
Start: 2024-06-05 | End: 2024-06-05 | Stop reason: HOSPADM

## 2024-06-05 RX ORDER — PROMETHAZINE HYDROCHLORIDE 12.5 MG/1
25 TABLET ORAL ONCE AS NEEDED
Status: DISCONTINUED | OUTPATIENT
Start: 2024-06-05 | End: 2024-06-05 | Stop reason: HOSPADM

## 2024-06-05 RX ORDER — HYDROCODONE BITARTRATE AND ACETAMINOPHEN 7.5; 325 MG/1; MG/1
1 TABLET ORAL EVERY 4 HOURS PRN
Qty: 15 TABLET | Refills: 0 | Status: SHIPPED | OUTPATIENT
Start: 2024-06-05

## 2024-06-05 RX ORDER — CLINDAMYCIN PHOSPHATE 900 MG/50ML
900 INJECTION, SOLUTION INTRAVENOUS ONCE
Status: DISCONTINUED | OUTPATIENT
Start: 2024-06-05 | End: 2024-06-05 | Stop reason: HOSPADM

## 2024-06-05 RX ORDER — SODIUM CHLORIDE, SODIUM LACTATE, POTASSIUM CHLORIDE, CALCIUM CHLORIDE 600; 310; 30; 20 MG/100ML; MG/100ML; MG/100ML; MG/100ML
9 INJECTION, SOLUTION INTRAVENOUS CONTINUOUS PRN
Status: DISCONTINUED | OUTPATIENT
Start: 2024-06-05 | End: 2024-06-05 | Stop reason: HOSPADM

## 2024-06-05 RX ORDER — LIDOCAINE HYDROCHLORIDE 20 MG/ML
INJECTION, SOLUTION EPIDURAL; INFILTRATION; INTRACAUDAL; PERINEURAL AS NEEDED
Status: DISCONTINUED | OUTPATIENT
Start: 2024-06-05 | End: 2024-06-05 | Stop reason: SURG

## 2024-06-05 RX ADMIN — SODIUM CHLORIDE, POTASSIUM CHLORIDE, SODIUM LACTATE AND CALCIUM CHLORIDE 9 ML/HR: 600; 310; 30; 20 INJECTION, SOLUTION INTRAVENOUS at 06:49

## 2024-06-05 RX ADMIN — DEXMEDETOMIDINE 10 MCG: 100 INJECTION, SOLUTION INTRAVENOUS at 07:26

## 2024-06-05 RX ADMIN — DEXMEDETOMIDINE 10 MCG: 100 INJECTION, SOLUTION INTRAVENOUS at 07:30

## 2024-06-05 RX ADMIN — DEXMEDETOMIDINE 10 MCG: 100 INJECTION, SOLUTION INTRAVENOUS at 07:36

## 2024-06-05 RX ADMIN — PROPOFOL 225 MCG/KG/MIN: 10 INJECTION, EMULSION INTRAVENOUS at 07:21

## 2024-06-05 RX ADMIN — ACETAMINOPHEN 1000 MG: 500 TABLET ORAL at 06:48

## 2024-06-05 RX ADMIN — MIDAZOLAM HYDROCHLORIDE 2 MG: 1 INJECTION, SOLUTION INTRAMUSCULAR; INTRAVENOUS at 06:51

## 2024-06-05 RX ADMIN — LIDOCAINE HYDROCHLORIDE 70 MG: 20 INJECTION, SOLUTION EPIDURAL; INFILTRATION; INTRACAUDAL; PERINEURAL at 07:21

## 2024-06-05 NOTE — ANESTHESIA POSTPROCEDURE EVALUATION
Patient: Elly Barrett    Procedure Summary       Date: 06/05/24 Room / Location: formerly [facility]  / formerly [facility]    Anesthesia Start: 0717 Anesthesia Stop: 0744    Procedure: LEFT THUMB TRIGGER RELEASE (Left: Fingers) Diagnosis:       Trigger thumb of left hand      (Trigger thumb of left hand [M65.312])    Surgeons: Renan Redding MD Provider: Yoandy Fraser MD    Anesthesia Type: general ASA Status: 2            Anesthesia Type: general    Vitals  Vitals Value Taken Time   /65 06/05/24 0815   Temp 36.3 °C (97.4 °F) 06/05/24 0741   Pulse 68 06/05/24 0815   Resp 16 06/05/24 0755   SpO2 100 % 06/05/24 0815         Post Anesthesia Care and Evaluation    Patient location during evaluation: bedside  Patient participation: complete - patient participated  Level of consciousness: awake    Airway patency: patent  PONV Status: none  Cardiovascular status: acceptable  Respiratory status: acceptable  Hydration status: acceptable

## 2024-06-05 NOTE — ANESTHESIA PREPROCEDURE EVALUATION
Anesthesia Evaluation     Patient summary reviewed and Nursing notes reviewed                Airway   Mallampati: I  TM distance: >3 FB  Neck ROM: full  No difficulty expected  Dental      Pulmonary - normal exam    breath sounds clear to auscultation  (+) asthma,  Cardiovascular - negative cardio ROS and normal exam    Rhythm: regular  Rate: normal        Neuro/Psych  (+) headaches, psychiatric history Anxiety and Depression  GI/Hepatic/Renal/Endo    (+) GERD    Musculoskeletal (-) negative ROS    Abdominal    Substance History   (+) alcohol use     OB/GYN negative ob/gyn ROS         Other                    Anesthesia Plan    ASA 2     general     intravenous induction     Anesthetic plan, risks, benefits, and alternatives have been provided, discussed and informed consent has been obtained with: patient.    CODE STATUS:

## 2024-06-05 NOTE — OP NOTE
FINGER TRIGGER RELEASE  Procedure Report    Patient Name:  Elly Barrett  YOB: 1980    Date of Surgery:  6/5/2024     Indications:  locking/catching of finger, failed conservative care    Pre-op Diagnosis:   Trigger thumb of left hand [M65.312]       Post-Op Diagnosis Codes:     * Trigger thumb of left hand [M65.312]    Procedure/CPT® Codes:      Procedure(s):  LEFT THUMB TRIGGER RELEASE    Staff:  Surgeon(s):  Renan Redding MD    Assistant: Ramo George    Anesthesia: General    Estimated Blood Loss: 0 mL    Implants:    Nothing was implanted during the procedure    Specimen:          None        Findings: triggering resolved    Complications: none    Description of Procedure: The patient was brought to the operating room where the patient underwent [place] block anesthesia. The patient was prepped and draped in a sterile fashion. An incision was made directly over the A1 pulley of the finger. This was dissected down. The A1 pulley was identified. This was then opened using a 15 blade, then completed using tenotomy scissors. Tendon was checked. There was no tendinous damage. This was then thoroughly irrigated. Closed using 4-0 nylon. Half percent plain Marcaine was injected. A sterile dressing was applied. Tourniquet deflated. Taken to the recovery room in stable condition. No complications.    Assistant: Ramo George  was responsible for performing the following activities: Retraction, Suction, Irrigation, and Placing Dressing and their skilled assistance was necessary for the success of this case.    Renan Redding MD     Date: 6/5/2024  Time: 07:41 EDT

## 2024-06-13 DIAGNOSIS — F41.1 GENERALIZED ANXIETY DISORDER: ICD-10-CM

## 2024-06-13 RX ORDER — HYDROXYZINE HYDROCHLORIDE 25 MG/1
25 TABLET, FILM COATED ORAL DAILY PRN
Qty: 90 TABLET | Refills: 0 | Status: SHIPPED | OUTPATIENT
Start: 2024-06-13

## 2024-06-20 ENCOUNTER — OFFICE VISIT (OUTPATIENT)
Dept: ORTHOPEDIC SURGERY | Facility: CLINIC | Age: 44
End: 2024-06-20
Payer: COMMERCIAL

## 2024-06-20 VITALS
DIASTOLIC BLOOD PRESSURE: 80 MMHG | BODY MASS INDEX: 23.49 KG/M2 | SYSTOLIC BLOOD PRESSURE: 115 MMHG | OXYGEN SATURATION: 98 % | HEART RATE: 71 BPM | WEIGHT: 155 LBS | HEIGHT: 68 IN

## 2024-06-20 DIAGNOSIS — M65.312 TRIGGER THUMB OF LEFT HAND: ICD-10-CM

## 2024-06-20 DIAGNOSIS — M79.642 LEFT HAND PAIN: Primary | ICD-10-CM

## 2024-06-20 PROCEDURE — 99024 POSTOP FOLLOW-UP VISIT: CPT

## 2024-06-21 NOTE — PROGRESS NOTES
"Chief Complaint  Follow-up and Pain of the Left Hand    Subjective      Elly Barrett presents to [facility] for follow up of her left hand.  Patient is 2 weeks status post left trigger thumb release performed by Dr. Redding on 6/5/2024.  She states that the locking sensation has resolved.  She does have a little bit of stiffness and tenderness along the thumb joint but overall the sensation has significantly improved.      Allergies   Allergen Reactions    Aspirin Hives    Cephalexin Hives    Egg Phospholipids Hives    Nsaids Hives    Penicillins Hives       Objective     Vital Signs:   Vitals:    06/20/24 1050   BP: 115/80   Pulse: 71   SpO2: 98%   Weight: 70.3 kg (155 lb)   Height: 172.7 cm (68\")     Body mass index is 23.57 kg/m².    I reviewed the patient's chief complaint, history of present illness, review of systems, past medical history, surgical history, family history, social history, medications, and allergy list.     REVIEW OF SYSTEMS    Constitutional: Denies fevers, chills, weight loss  Cardiovascular: Denies chest pain, shortness of breath  Skin: Denies rashes, acute skin changes  Neurologic: Denies headache, loss of consciousness  MSK: Left hand pain.     Ortho Exam  Carpal Tunnel Release   General: Alert. No acute distress.  Left upper extremity: Splint removed.  Sutures removed today without complications.  Well-healing incision about the palm.  No active drainage or redness noted. No concerning signs of infection. Full elbow range of motion. Finger range of motion intact.  No pain with finger range of motion. Demonstrates active wrist flexion and extension with associated stiffness. Thumb opposition intact. Palmar abduction of the thumb intact.  Sensation intact. Neurovascularly intact. Palpable radial pulse.            Imaging Results (Most Recent)       None                Assessment and Plan   Diagnoses and all orders for this visit:    1. Left hand pain " (Primary)    2. Trigger thumb of left hand         Elly Barrett presents today 2 weeks post op left thumb trigger finger release performed by Dr. Redding on 6/5/2024. Sutures removed today in office without complications. No active drainage or redness noted. No concerning signs of infection.  Incision site care was reviewed today. Patient instructed not to soak or submerge incision. Do not apply any lotions, creams, or ointments to the incision at this time.  We discussed concerning signs and symptoms regarding the incision site.  Patient understood and agreed.  Continue icing and elevation as needed help with pain and swelling.     Discussed avoiding repetitive range of motion of the hand or wrist. Gentle hand range of motion exercises demonstrated and recommended for patient to perform on their own at home. Home exercise print out provided to patient. No heavy lifting, pushing or pulling until incision is fully healed (approximately 1-2 more weeks).  Discussed the importance of these exercises, including working on making a tight fist. We discussed ordering formal occupational therapy however patient does not need it.  Patient can utilize ice/elevation as needed for pain/swelling of the hand/finger.     Patient will follow-up as needed.    Call or return if symptoms worsen or patient has any concerns.           BMI is within normal parameters. No other follow-up for BMI required.      Follow Up   No follow-ups on file.  There are no Patient Instructions on file for this visit.  Patient was given instructions and counseling regarding her condition or for health maintenance advice. Please see specific information pulled into the AVS if appropriate.       Dictated Utilizing Dragon Dictation. Please note that portions of this note were completed with a voice recognition program. Part of this note may be an electronic transcription/translation of spoken language to printed text using the Dragon Dictation System.

## 2024-07-08 DIAGNOSIS — R11.2 NAUSEA AND VOMITING, UNSPECIFIED VOMITING TYPE: ICD-10-CM

## 2024-07-08 RX ORDER — PROMETHAZINE HYDROCHLORIDE 25 MG/1
25 TABLET ORAL EVERY 8 HOURS PRN
Qty: 20 TABLET | Refills: 0 | Status: SHIPPED | OUTPATIENT
Start: 2024-07-08

## 2024-07-12 ENCOUNTER — CLINICAL SUPPORT (OUTPATIENT)
Dept: FAMILY MEDICINE CLINIC | Facility: CLINIC | Age: 44
End: 2024-07-12
Payer: COMMERCIAL

## 2024-07-12 DIAGNOSIS — Z30.9 ENCOUNTER FOR CONTRACEPTIVE MANAGEMENT, UNSPECIFIED TYPE: Primary | ICD-10-CM

## 2024-07-12 DIAGNOSIS — Z30.42 ENCOUNTER FOR SURVEILLANCE OF INJECTABLE CONTRACEPTIVE: Primary | ICD-10-CM

## 2024-07-12 RX ORDER — MEDROXYPROGESTERONE ACETATE 150 MG/ML
150 INJECTION, SUSPENSION INTRAMUSCULAR
Qty: 1 ML | Refills: 3 | Status: SHIPPED | OUTPATIENT
Start: 2024-07-12

## 2024-07-12 RX ORDER — MEDROXYPROGESTERONE ACETATE 150 MG/ML
150 INJECTION, SUSPENSION INTRAMUSCULAR ONCE
Status: SHIPPED | OUTPATIENT
Start: 2024-07-12

## 2024-08-23 DIAGNOSIS — F31.74 BIPOLAR 1 DISORDER, MANIC, FULL REMISSION: ICD-10-CM

## 2024-08-23 RX ORDER — QUETIAPINE FUMARATE 50 MG/1
50 TABLET, FILM COATED ORAL NIGHTLY
Qty: 30 TABLET | Refills: 0 | OUTPATIENT
Start: 2024-08-23

## 2024-08-26 ENCOUNTER — TELEPHONE (OUTPATIENT)
Dept: GASTROENTEROLOGY | Facility: CLINIC | Age: 44
End: 2024-08-26
Payer: COMMERCIAL

## 2024-08-26 NOTE — TELEPHONE ENCOUNTER
Was calling patients from waitlist, Paty CASTILLO had a appointment open today. Spoke with patient and couldn't fill appointment.

## 2024-08-29 ENCOUNTER — OFFICE VISIT (OUTPATIENT)
Dept: FAMILY MEDICINE CLINIC | Facility: CLINIC | Age: 44
End: 2024-08-29
Payer: COMMERCIAL

## 2024-08-29 VITALS
DIASTOLIC BLOOD PRESSURE: 70 MMHG | HEIGHT: 67 IN | SYSTOLIC BLOOD PRESSURE: 110 MMHG | WEIGHT: 160.2 LBS | TEMPERATURE: 98.4 F | HEART RATE: 63 BPM | OXYGEN SATURATION: 98 % | BODY MASS INDEX: 25.15 KG/M2

## 2024-08-29 DIAGNOSIS — K21.01 GASTROESOPHAGEAL REFLUX DISEASE WITH ESOPHAGITIS AND HEMORRHAGE: ICD-10-CM

## 2024-08-29 DIAGNOSIS — L70.9 ACNE, UNSPECIFIED ACNE TYPE: ICD-10-CM

## 2024-08-29 DIAGNOSIS — Z12.11 SCREENING FOR COLON CANCER: ICD-10-CM

## 2024-08-29 DIAGNOSIS — Z91.038 ALLERGIC TO INSECT BITES: Primary | ICD-10-CM

## 2024-08-29 DIAGNOSIS — Z12.31 BREAST CANCER SCREENING BY MAMMOGRAM: ICD-10-CM

## 2024-08-29 PROCEDURE — 1126F AMNT PAIN NOTED NONE PRSNT: CPT | Performed by: FAMILY MEDICINE

## 2024-08-29 PROCEDURE — 99214 OFFICE O/P EST MOD 30 MIN: CPT | Performed by: FAMILY MEDICINE

## 2024-08-29 PROCEDURE — 1160F RVW MEDS BY RX/DR IN RCRD: CPT | Performed by: FAMILY MEDICINE

## 2024-08-29 PROCEDURE — 83013 H PYLORI (C-13) BREATH: CPT | Performed by: FAMILY MEDICINE

## 2024-08-29 PROCEDURE — 1159F MED LIST DOCD IN RCRD: CPT | Performed by: FAMILY MEDICINE

## 2024-08-29 RX ORDER — HYDROCORTISONE 2.5 %
1 CREAM (GRAM) TOPICAL 2 TIMES DAILY
Qty: 28 G | Refills: 2 | Status: SHIPPED | OUTPATIENT
Start: 2024-08-29 | End: 2024-09-28

## 2024-08-29 RX ORDER — FAMOTIDINE 40 MG/1
40 TABLET, FILM COATED ORAL DAILY
Qty: 90 TABLET | Refills: 1 | Status: SHIPPED | OUTPATIENT
Start: 2024-08-29 | End: 2024-11-27

## 2024-08-29 RX ORDER — DEXLANSOPRAZOLE 60 MG/1
1 CAPSULE, DELAYED RELEASE ORAL DAILY
COMMUNITY
Start: 2024-08-18

## 2024-08-30 LAB — UREA BREATH TEST QL: NEGATIVE

## 2024-08-30 RX ORDER — OFLOXACIN 3 MG/ML
SOLUTION AURICULAR (OTIC)
Qty: 10 ML | Refills: 0 | OUTPATIENT
Start: 2024-08-30

## 2024-09-02 DIAGNOSIS — R11.2 NAUSEA AND VOMITING, UNSPECIFIED VOMITING TYPE: ICD-10-CM

## 2024-09-02 DIAGNOSIS — F31.74 BIPOLAR 1 DISORDER, MANIC, FULL REMISSION: ICD-10-CM

## 2024-09-02 DIAGNOSIS — L70.0 CYSTIC ACNE VULGARIS: ICD-10-CM

## 2024-09-03 RX ORDER — PROMETHAZINE HYDROCHLORIDE 25 MG/1
TABLET ORAL
Qty: 20 TABLET | Refills: 0 | OUTPATIENT
Start: 2024-09-03

## 2024-09-03 RX ORDER — PROMETHAZINE HYDROCHLORIDE 25 MG/1
25 TABLET ORAL EVERY 8 HOURS PRN
Qty: 20 TABLET | Refills: 0 | Status: SHIPPED | OUTPATIENT
Start: 2024-09-03

## 2024-09-03 RX ORDER — CLINDAMYCIN PHOSPHATE 11.9 MG/ML
SOLUTION TOPICAL
Qty: 60 ML | Refills: 0 | Status: SHIPPED | OUTPATIENT
Start: 2024-09-03

## 2024-09-03 RX ORDER — QUETIAPINE FUMARATE 50 MG/1
50 TABLET, FILM COATED ORAL NIGHTLY
Qty: 30 TABLET | Refills: 0 | OUTPATIENT
Start: 2024-09-03

## 2024-09-10 ENCOUNTER — TELEPHONE (OUTPATIENT)
Dept: FAMILY MEDICINE CLINIC | Facility: CLINIC | Age: 44
End: 2024-09-10
Payer: COMMERCIAL

## 2024-09-10 RX ORDER — LEVOCETIRIZINE DIHYDROCHLORIDE 5 MG/1
5 TABLET, FILM COATED ORAL EVERY EVENING
Qty: 90 TABLET | Refills: 1 | Status: SHIPPED | OUTPATIENT
Start: 2024-09-10

## 2024-09-10 NOTE — TELEPHONE ENCOUNTER
Caller: Elly Barrett    Relationship: Self    Best call back number:     [phone]       What medication are you requesting: XYZAL    What are your current symptoms: ALLERGIES    If a prescription is needed, what is your preferred pharmacy and phone number: [facility] [address] [place] [id number] [facility] [phone] [facility] [phone]      Additional notes: PATIENT DID TRY SAMPLES OF THIS MEDICATION AND IT DOES HELP. PLEASE SEND PRESCRIPTION.

## 2024-09-27 DIAGNOSIS — L70.0 CYSTIC ACNE VULGARIS: ICD-10-CM

## 2024-09-27 RX ORDER — CLINDAMYCIN PHOSPHATE 11.9 MG/ML
SOLUTION TOPICAL
Qty: 60 ML | Refills: 0 | Status: SHIPPED | OUTPATIENT
Start: 2024-09-27

## 2024-10-01 ENCOUNTER — OFFICE VISIT (OUTPATIENT)
Dept: GASTROENTEROLOGY | Facility: CLINIC | Age: 44
End: 2024-10-01
Payer: COMMERCIAL

## 2024-10-01 ENCOUNTER — TELEPHONE (OUTPATIENT)
Dept: GASTROENTEROLOGY | Facility: CLINIC | Age: 44
End: 2024-10-01

## 2024-10-01 ENCOUNTER — CLINICAL SUPPORT (OUTPATIENT)
Dept: FAMILY MEDICINE CLINIC | Facility: CLINIC | Age: 44
End: 2024-10-01
Payer: COMMERCIAL

## 2024-10-01 VITALS
DIASTOLIC BLOOD PRESSURE: 72 MMHG | HEIGHT: 67 IN | BODY MASS INDEX: 25.96 KG/M2 | HEART RATE: 58 BPM | SYSTOLIC BLOOD PRESSURE: 125 MMHG | WEIGHT: 165.4 LBS

## 2024-10-01 DIAGNOSIS — Z30.42 ENCOUNTER FOR SURVEILLANCE OF INJECTABLE CONTRACEPTIVE: Primary | ICD-10-CM

## 2024-10-01 DIAGNOSIS — R09.A2 GLOBUS SENSATION: ICD-10-CM

## 2024-10-01 DIAGNOSIS — R12 HEARTBURN: Primary | ICD-10-CM

## 2024-10-01 DIAGNOSIS — Z12.11 ENCOUNTER FOR SCREENING FOR MALIGNANT NEOPLASM OF COLON: ICD-10-CM

## 2024-10-01 DIAGNOSIS — K92.0 HEMATEMESIS WITH NAUSEA: ICD-10-CM

## 2024-10-01 PROCEDURE — 96372 THER/PROPH/DIAG INJ SC/IM: CPT | Performed by: FAMILY MEDICINE

## 2024-10-01 PROCEDURE — 99214 OFFICE O/P EST MOD 30 MIN: CPT | Performed by: NURSE PRACTITIONER

## 2024-10-01 PROCEDURE — 1160F RVW MEDS BY RX/DR IN RCRD: CPT | Performed by: NURSE PRACTITIONER

## 2024-10-01 PROCEDURE — 1159F MED LIST DOCD IN RCRD: CPT | Performed by: NURSE PRACTITIONER

## 2024-10-01 RX ORDER — MEDROXYPROGESTERONE ACETATE 150 MG/ML
150 INJECTION, SUSPENSION INTRAMUSCULAR ONCE
Status: COMPLETED | OUTPATIENT
Start: 2024-10-01 | End: 2024-10-01

## 2024-10-01 RX ADMIN — MEDROXYPROGESTERONE ACETATE 150 MG: 150 INJECTION, SUSPENSION INTRAMUSCULAR at 13:58

## 2024-10-01 NOTE — TELEPHONE ENCOUNTER
Plan requires PA for Linzess. PA sent to plan. Rec'd an immediate response of Approval. I have called and notified pt.

## 2024-10-01 NOTE — PROGRESS NOTES
Patient Name: Elly Barrett   Visit Date: 10/01/2024   Patient ID: [id number]  Provider: ÁNGEL Jim    Sex: female  Location:  Location Address:  Location Phone: [address]  [place] [address] [phone]    YOB: 1980  Age: 43 y.o.   Primary Care Provider Sofie Ku MD      Referring Provider: Sofie Ku MD        Chief Complaint  Heartburn (Daily, worse at bedtime ), Nausea (Daily, worse with meals ), Vomiting (Pt is having 1 episode daily, has not had any hematemesis for about 2 months ), and Constipation (Pt is having 1 BM weekly with straining having hard stool, insurance did not cover linzess. Patient is taking OTC stool softeners and suppositories )    History of Present Illness    Patient has not been seen since 2018, at that time patient presented with epigastric pain and nausea and vomiting  EGD ordered Linzess prescribed.  Patient has canceled or missed several appointments since initial visit    Patient had EGD in 2020 by Dr. Villalta: Small hiatal hernia, grade A esophagitis-biopsy with reflux esophagitis otherwise negative, normal stomach and normal duodenum  Protonix was prescribed  Pt states her   in  which drastically affected her.     Pt states she has frequent globus sensation, frequent throat clearing. Pt is taking Dexilant now and Pepcid. She has changed diet to very bland, still having HB. Pt states she had regurgitation and had blood in emesis a couple months ago, has nausea frequently. Takes Phenergan TID. Pt avoids eating until night time d/t heartburn, takes Dexilant at night.   Pt states she has constipation -- BM once/week, [place] #1. Pt states linzess 145 mcg/d caused diarrhea. States insurance also quit covering. No blood seen in stool. No abd pain, feels distended.   Past Medical History:   Diagnosis Date    Abdominal pain     Acid reflux     ADHD     Allergy     Anxiety     Asthma     Bipolar disorder     Borderline  personality disorder     Chronic idiopathic constipation     Epigastric pain     Hallucinations     HL (hearing loss)     Insomnia     Irritable bowel syndrome     Major depressive disorder     Migraines     Panic disorder     PTSD (post-traumatic stress disorder)     Self-injurious behavior     Suicidal thoughts     Suicide attempt     Tobacco use        Past Surgical History:   Procedure Laterality Date    BREAST AUGMENTATION  2005     SECTION      ENDOSCOPY  2018    TRIGGER FINGER RELEASE Right 2019    right thumb    TRIGGER FINGER RELEASE Left 2024    Procedure: LEFT THUMB TRIGGER RELEASE;  Surgeon: Renan Redding MD;  Location: [facility] OR [facility] [place];  Service: Orthopedics;  Laterality: Left;       Allergies   Allergen Reactions    Aspirin Hives    Cephalexin Hives    Egg Phospholipids Hives    Nsaids Hives    Penicillins Hives       Family History   Problem Relation Age of Onset    Heart disease Mother     Diabetes Mother     Osteoporosis Mother     Heart disease Father     Osteoporosis Father     Arthritis Father     Stroke Father     Alcohol abuse Father     Drug abuse Father     No Known Problems Sister     Bipolar disorder Maternal Grandmother     Depression Maternal Grandmother     Arthritis Other     Colon cancer Neg Hx         Social History     Tobacco Use    Smoking status: Former     Current packs/day: 0.00     Average packs/day: 2.3 packs/day for 43.8 years (100.0 ttl pk-yrs)     Types: Cigarettes     Start date: 1997     Quit date: 2022     Years since quittin.1     Passive exposure: Past    Smokeless tobacco: Never    Tobacco comments:     SMOKED 11-20 YEARS   Vaping Use    Vaping status: Former    Substances: Nicotine    Devices: Disposable   Substance Use Topics    Alcohol use: Not Currently    Drug use: Not Currently     Comment: rarely       Objective     Vital Signs:   /72 (BP Location: Right arm, Patient Position: Sitting, Cuff Size: Adult)   Pulse 58   Ht  "170.2 cm (67.01\")   Wt 75 kg (165 lb 6.4 oz)   BMI 25.90 kg/m²       Physical Exam  Constitutional:       General: The patient is not in acute distress.     Appearance: Normal appearance.   HENT:      Head: Normocephalic and atraumatic.      Nose: Nose normal.   Pulmonary:      Effort: Pulmonary effort is normal. No respiratory distress.   Abdominal:      General: Abdomen is flat.      Palpations: Abdomen is soft. There is no mass.      Tenderness: There is no abdominal tenderness. There is no guarding.   Musculoskeletal:      Cervical back: Neck supple.      Right lower leg: No edema.      Left lower leg: No edema.   Skin:     General: Skin is warm and dry.   Neurological:      General: No focal deficit present.      Mental Status: The patient is alert and oriented to person, place, and time.      Gait: Gait normal.   Psychiatric:         Mood and Affect: Mood normal.         Speech: Speech normal.         Behavior: Behavior normal.         Thought Content: Thought content normal.     Result Review :   The following data was reviewed by: ÁNGEL Jim on 10/01/2024:                    Assessment and Plan    Diagnoses and all orders for this visit:    1. Heartburn (Primary)  -     Case Request; Standing  -     Case Request    2. Globus sensation  -     Case Request; Standing  -     Case Request    3. Hematemesis with nausea  -     Case Request; Standing  -     Case Request  -     NM Gastric Emptying; Future    4. Encounter for screening for malignant neoplasm of colon  -     Case Request; Standing  -     Case Request    Other orders  -     polyethylene glycol (GoLYTELY) 236 g solution; Take per office instructions  Dispense: 4000 mL; Refill: 0  -     Implement Anesthesia orders day of procedure.; Standing  -     Follow Anesthesia Guidelines / Protocol; Future  -     Obtain Informed Consent; Future  -     Verify NPO; Standing  -     Verify bowel prep was successful; Standing  -     Give tap water " enema if bowel prep was insufficient; Standing  -     Obtain Informed Consent; Standing  -     linaclotide (Linzess) 72 MCG capsule capsule; Take 1 capsule by mouth Every Morning Before Breakfast for 90 days.  Dispense: 90 capsule; Refill: 0              Follow Up   Return for follow up after procedure.  EGD/COLONOSCOPY Surgical Risk and Benefits: Possible risks/complications, benefits, and alternatives to surgical or invasive procedure have been explained to patient and/or legal guardian; risks include bleeding, infection, and perforation. Patient has been evaluated and can tolerate anesthesia and/or sedation. Risks, benefits, and alternatives to anesthesia and sedation have been explained to patient and/or legal guardian.   Linzess 72 mcg/d - call if any problems with this  Check GES d/t persistent nausea  Recommend taking Dexilant in the morning. Do not avoid eating all day. Drink shakes if needed.     Patient was given instructions and counseling regarding her condition or for health maintenance advice. Please see specific information pulled into the AVS if appropriate.

## 2024-10-02 DIAGNOSIS — F31.74 BIPOLAR 1 DISORDER, MANIC, FULL REMISSION: ICD-10-CM

## 2024-10-02 DIAGNOSIS — F41.1 GENERALIZED ANXIETY DISORDER: ICD-10-CM

## 2024-10-02 DIAGNOSIS — R11.2 NAUSEA AND VOMITING, UNSPECIFIED VOMITING TYPE: ICD-10-CM

## 2024-10-02 RX ORDER — PROMETHAZINE HYDROCHLORIDE 25 MG/1
25 TABLET ORAL EVERY 8 HOURS PRN
Qty: 20 TABLET | Refills: 0 | Status: SHIPPED | OUTPATIENT
Start: 2024-10-02

## 2024-10-02 RX ORDER — VENLAFAXINE HYDROCHLORIDE 75 MG/1
75 CAPSULE, EXTENDED RELEASE ORAL DAILY
Qty: 90 CAPSULE | Refills: 0 | Status: SHIPPED | OUTPATIENT
Start: 2024-10-02

## 2024-10-02 RX ORDER — HYDROXYZINE HYDROCHLORIDE 25 MG/1
25 TABLET, FILM COATED ORAL DAILY PRN
Qty: 90 TABLET | Refills: 0 | Status: SHIPPED | OUTPATIENT
Start: 2024-10-02

## 2024-10-03 ENCOUNTER — PATIENT ROUNDING (BHMG ONLY) (OUTPATIENT)
Dept: GASTROENTEROLOGY | Facility: CLINIC | Age: 44
End: 2024-10-03
Payer: COMMERCIAL

## 2024-10-03 NOTE — PROGRESS NOTES
"10/3/2024      Hello, may I speak with Elly Barrett     My name is Ag. I am calling from [facility]. I show that you had a recent visit with ÁNGEL Webb.    Before we get started may I verify your date of birth? 1980    I am calling to officially welcome you to our practice and ask about your recent visit. Is this a good time to talk? Yes    Tell me about your visit with us. What things went well? \"Everything went well! I have never been disappointment with my care with Paty or Dr. Villalta. They're great!\"     We strive to ensure that we protect your safety and privacy. Is there anything we could have done to improve this during your visit?   No     We're always looking for ways to make our patients' experiences even better. Do you have recommendations on ways we may improve? No    Overall were you satisfied with your first visit to our practice? Yes    I appreciate you taking the time to speak with me today. Is there anything else I can do for you? No    I am glad to hear that you had a very good visit and I appreciate you taking the time to provide feedback on this call. We would greatly appreciate you filling out a survey if you receive one in the mail, email or text. This is a great opportunity to provide any additional feedback that you may think of after this call as well.       Thank you, and have a great day.    "

## 2024-10-06 DIAGNOSIS — F31.74 BIPOLAR 1 DISORDER, MANIC, FULL REMISSION: ICD-10-CM

## 2024-10-07 RX ORDER — OFLOXACIN 3 MG/ML
SOLUTION AURICULAR (OTIC)
Qty: 10 ML | Refills: 0 | Status: SHIPPED | OUTPATIENT
Start: 2024-10-07

## 2024-10-07 RX ORDER — LAMOTRIGINE 200 MG/1
TABLET ORAL
Qty: 180 TABLET | Refills: 0 | Status: SHIPPED | OUTPATIENT
Start: 2024-10-07

## 2024-10-15 ENCOUNTER — HOSPITAL ENCOUNTER (OUTPATIENT)
Dept: MAMMOGRAPHY | Facility: HOSPITAL | Age: 44
Discharge: HOME | End: 2024-10-15
Payer: COMMERCIAL

## 2024-11-05 RX ORDER — CLINDAMYCIN PHOSPHATE 11.9 MG/ML
SOLUTION TOPICAL 2 TIMES DAILY
Status: CANCELLED | OUTPATIENT
Start: 2024-11-05

## 2024-11-05 NOTE — TELEPHONE ENCOUNTER
Rx Refill Note  Requested Prescriptions     Pending Prescriptions Disp Refills    clindamycin (CLEOCIN T) 1 % external solution       Sig: Apply  topically to the appropriate area as directed 2 (Two) Times a Day.      Last office visit with prescribing clinician: 8/29/2024   Last telemedicine visit with prescribing clinician: Visit date not found   Next office visit with prescribing clinician: 2/28/2025                         Would you like a call back once the refill request has been completed: [] Yes [] No    If the office needs to give you a call back, can they leave a voicemail: [] Yes [] No    Scott De Jesus Rep  11/05/24, 10:47 EST

## 2024-11-07 DIAGNOSIS — R11.2 NAUSEA AND VOMITING, UNSPECIFIED VOMITING TYPE: ICD-10-CM

## 2024-11-07 RX ORDER — PROMETHAZINE HYDROCHLORIDE 25 MG/1
25 TABLET ORAL EVERY 8 HOURS PRN
Qty: 20 TABLET | Refills: 0 | Status: SHIPPED | OUTPATIENT
Start: 2024-11-07

## 2024-11-07 RX ORDER — LINACLOTIDE 72 UG/1
CAPSULE, GELATIN COATED ORAL
Qty: 90 CAPSULE | Refills: 0 | Status: SHIPPED | OUTPATIENT
Start: 2024-11-07

## 2024-11-07 RX ORDER — OFLOXACIN 3 MG/ML
SOLUTION AURICULAR (OTIC)
Qty: 10 ML | Refills: 0 | Status: SHIPPED | OUTPATIENT
Start: 2024-11-07

## 2024-11-13 ENCOUNTER — HOSPITAL ENCOUNTER (OUTPATIENT)
Dept: NUCLEAR MEDICINE | Facility: HOSPITAL | Age: 44
Discharge: HOME | End: 2024-11-13
Payer: COMMERCIAL

## 2024-11-13 DIAGNOSIS — K92.0 HEMATEMESIS WITH NAUSEA: ICD-10-CM

## 2024-11-13 PROCEDURE — 34310000005 TECHNETIUM SULFUR COLLOID: Performed by: NURSE PRACTITIONER

## 2024-11-13 PROCEDURE — 78264 GASTRIC EMPTYING IMG STUDY: CPT

## 2024-11-13 PROCEDURE — A9541 TC99M SULFUR COLLOID: HCPCS | Performed by: NURSE PRACTITIONER

## 2024-11-13 RX ADMIN — TECHNETIUM TC 99M SULFUR COLLOID 1 DOSE: KIT at 07:56

## 2024-12-02 DIAGNOSIS — R11.2 NAUSEA AND VOMITING, UNSPECIFIED VOMITING TYPE: ICD-10-CM

## 2024-12-03 RX ORDER — PROMETHAZINE HYDROCHLORIDE 25 MG/1
25 TABLET ORAL EVERY 8 HOURS PRN
Qty: 20 TABLET | Refills: 0 | Status: SHIPPED | OUTPATIENT
Start: 2024-12-03

## 2024-12-05 ENCOUNTER — ANESTHESIA EVENT (OUTPATIENT)
Dept: GASTROENTEROLOGY | Facility: HOSPITAL | Age: 44
End: 2024-12-05
Payer: COMMERCIAL

## 2024-12-05 NOTE — ANESTHESIA PREPROCEDURE EVALUATION
Anesthesia Evaluation     Patient summary reviewed and Nursing notes reviewed   NPO Solid Status: > 8 hours  NPO Liquid Status: > 4 hours           Airway   Mallampati: I  TM distance: <3 FB  Neck ROM: full  No difficulty expected  Dental - normal exam         Pulmonary - normal exam    breath sounds clear to auscultation  (+) a smoker (former) Former, asthma (mild),  Cardiovascular - normal exam  Exercise tolerance: good (4-7 METS)    ECG reviewed  Rhythm: regular  Rate: normal        Neuro/Psych  (+) headaches (MIGRAINES), psychiatric history Anxiety, Depression, Bipolar, PTSD and ADHD  GI/Hepatic/Renal/Endo    (+) GERD well controlled    Musculoskeletal     Abdominal    Substance History   (+) alcohol use (NONE CURRENTLY)     OB/GYN          Other        ROS/Med Hx Other: Heartburn, globus sensation    EKG 06/13/19: HR 76, SR    HCG pending                    Anesthesia Plan    ASA 2     general   total IV anesthesia  (Total IV Anesthesia    Patient understands anesthesia not responsible for dental damage.  )  intravenous induction     Anesthetic plan, risks, benefits, and alternatives have been provided, discussed and informed consent has been obtained with: patient.  Pre-procedure education provided  Plan discussed with CRNA.      CODE STATUS:

## 2024-12-06 ENCOUNTER — ANESTHESIA (OUTPATIENT)
Dept: GASTROENTEROLOGY | Facility: HOSPITAL | Age: 44
End: 2024-12-06
Payer: COMMERCIAL

## 2024-12-06 ENCOUNTER — HOSPITAL ENCOUNTER (OUTPATIENT)
Facility: HOSPITAL | Age: 44
Setting detail: HOSPITAL OUTPATIENT SURGERY
Discharge: HOME | End: 2024-12-06
Attending: INTERNAL MEDICINE | Admitting: INTERNAL MEDICINE
Payer: COMMERCIAL

## 2024-12-06 VITALS
TEMPERATURE: 97.8 F | WEIGHT: 160.05 LBS | HEART RATE: 81 BPM | BODY MASS INDEX: 24.26 KG/M2 | HEIGHT: 68 IN | DIASTOLIC BLOOD PRESSURE: 85 MMHG | OXYGEN SATURATION: 100 % | SYSTOLIC BLOOD PRESSURE: 112 MMHG | RESPIRATION RATE: 18 BRPM

## 2024-12-06 DIAGNOSIS — K92.0 HEMATEMESIS WITH NAUSEA: ICD-10-CM

## 2024-12-06 DIAGNOSIS — R12 HEARTBURN: ICD-10-CM

## 2024-12-06 DIAGNOSIS — R09.A2 GLOBUS SENSATION: ICD-10-CM

## 2024-12-06 LAB — B-HCG UR QL: NEGATIVE

## 2024-12-06 PROCEDURE — 25010000002 PROPOFOL 10 MG/ML EMULSION

## 2024-12-06 PROCEDURE — 45378 DIAGNOSTIC COLONOSCOPY: CPT | Performed by: INTERNAL MEDICINE

## 2024-12-06 PROCEDURE — 43239 EGD BIOPSY SINGLE/MULTIPLE: CPT | Performed by: INTERNAL MEDICINE

## 2024-12-06 PROCEDURE — 25010000002 LIDOCAINE PF 2% 2 % SOLUTION

## 2024-12-06 PROCEDURE — 81025 URINE PREGNANCY TEST: CPT | Performed by: NURSE ANESTHETIST, CERTIFIED REGISTERED

## 2024-12-06 PROCEDURE — 88305 TISSUE EXAM BY PATHOLOGIST: CPT | Performed by: INTERNAL MEDICINE

## 2024-12-06 RX ORDER — LIDOCAINE HYDROCHLORIDE 20 MG/ML
INJECTION, SOLUTION EPIDURAL; INFILTRATION; INTRACAUDAL; PERINEURAL AS NEEDED
Status: DISCONTINUED | OUTPATIENT
Start: 2024-12-06 | End: 2024-12-06 | Stop reason: SURG

## 2024-12-06 RX ORDER — PROPOFOL 10 MG/ML
VIAL (ML) INTRAVENOUS AS NEEDED
Status: DISCONTINUED | OUTPATIENT
Start: 2024-12-06 | End: 2024-12-06 | Stop reason: SURG

## 2024-12-06 RX ORDER — SODIUM CHLORIDE, SODIUM LACTATE, POTASSIUM CHLORIDE, CALCIUM CHLORIDE 600; 310; 30; 20 MG/100ML; MG/100ML; MG/100ML; MG/100ML
30 INJECTION, SOLUTION INTRAVENOUS CONTINUOUS
Status: CANCELLED | OUTPATIENT
Start: 2024-12-06 | End: 2024-12-06

## 2024-12-06 RX ADMIN — PROPOFOL 200 MCG/KG/MIN: 10 INJECTION, EMULSION INTRAVENOUS at 10:47

## 2024-12-06 RX ADMIN — PROPOFOL 100 MG: 10 INJECTION, EMULSION INTRAVENOUS at 10:46

## 2024-12-06 RX ADMIN — LIDOCAINE HYDROCHLORIDE 40 MG: 20 INJECTION, SOLUTION INTRAVENOUS at 10:45

## 2024-12-06 NOTE — ANESTHESIA POSTPROCEDURE EVALUATION
Patient: Elly Barrett    Procedure Summary       Date: 12/06/24 Room / Location: [facility] ENDOSCOPY 4 / [facility] ENDOSCOPY    Anesthesia Start: 1042 Anesthesia Stop: 1113    Procedures:       COLONOSCOPY      ESOPHAGOGASTRODUODENOSCOPY Diagnosis:       Heartburn      Globus sensation      Hematemesis with nausea      (Heartburn [R12])      (Globus sensation [R09.A2])      (Hematemesis with nausea [K92.0])    Surgeons: Arsenio Villalta MD Provider: Maye Welch CRNA    Anesthesia Type: general ASA Status: 2            Anesthesia Type: general    Vitals  Vitals Value Taken Time   /85 12/06/24 1129   Temp 36.6 °C (97.8 °F) 12/06/24 1128   Pulse 72 12/06/24 1143   Resp 18 12/06/24 1128   SpO2 99 % 12/06/24 1143   Vitals shown include unfiled device data.        Post Anesthesia Care and Evaluation    Post-procedure mental status: acceptable.  Pain management: satisfactory to patient    Airway patency: patent  Anesthetic complications: No anesthetic complications    Cardiovascular status: acceptable  Respiratory status: acceptable    Comments: Per chart review

## 2024-12-06 NOTE — H&P
Pre Procedure History & Physical    Chief Complaint:   Heartburn, globus sensation, hematemesis, colon cancer screening    Subjective     HPI:   Heartburn, hematemesis, globus sensation, colon cancer screening    Past Medical History:   Past Medical History:   Diagnosis Date    Abdominal pain     Acid reflux     ADHD     Allergy     Anxiety     Asthma     Bipolar disorder     Borderline personality disorder     Chronic idiopathic constipation     Epigastric pain     Hallucinations     HL (hearing loss)     Insomnia     Irritable bowel syndrome     Major depressive disorder     Migraines     Panic disorder     PTSD (post-traumatic stress disorder)     Self-injurious behavior     Suicidal thoughts     Suicide attempt     Tobacco use        Past Surgical History:  Past Surgical History:   Procedure Laterality Date    BREAST AUGMENTATION       SECTION      ENDOSCOPY  2018    TRIGGER FINGER RELEASE Right 2019    right thumb    TRIGGER FINGER RELEASE Left 2024    Procedure: LEFT THUMB TRIGGER RELEASE;  Surgeon: Renan Redding MD;  Location: [facility] [place];  Service: Orthopedics;  Laterality: Left;       Family History:  Family History   Problem Relation Age of Onset    Heart disease Mother     Diabetes Mother     Osteoporosis Mother     Heart disease Father     Osteoporosis Father     Arthritis Father     Stroke Father     Alcohol abuse Father     Drug abuse Father     No Known Problems Sister     Bipolar disorder Maternal Grandmother     Depression Maternal Grandmother     Arthritis Other     Colon cancer Neg Hx        Social History:   reports that she quit smoking about 2 years ago. Her smoking use included cigarettes. She started smoking about 27 years ago. She has a 100 pack-year smoking history. She has been exposed to tobacco smoke. She has never used smokeless tobacco. She reports that she does not currently use alcohol. She reports that she does not currently use drugs.    Medications:  "  Medications Prior to Admission   Medication Sig Dispense Refill Last Dose/Taking    dexlansoprazole (DEXILANT) 60 MG capsule Take 1 capsule by mouth Daily.       EPINEPHrine (EPIPEN) 0.3 MG/0.3ML solution auto-injector injection  (Patient not taking: Reported on 10/1/2024)       hydrOXYzine (ATARAX) 25 MG tablet TAKE 1 TABLET BY MOUTH ONCE DAILY AS NEEDED FOR ANXIETY 90 tablet 0     lamoTRIgine (LaMICtal) 200 MG tablet TAKE 2 TABLETS BY MOUTH ONCE DAILY AT NIGHT AT BEDTIME 180 tablet 0     levocetirizine (XYZAL) 5 MG tablet Take 1 tablet by mouth Every Evening. 90 tablet 1     Linzess 72 MCG capsule capsule TAKE 1 CAPSULE BY MOUTH IN THE MORNING BEFORE BREAKFAST FOR 90 DAYS 90 capsule 0     medroxyPROGESTERone Acetate 150 MG/ML suspension prefilled syringe Inject 1 mL into the appropriate muscle as directed by prescriber Every 3 (Three) Months. 1 mL 3     ofloxacin (FLOXIN) 0.3 % otic solution INSTILL 5 DROPS INTO EACH EAR ONCE DAILY 10 mL 0     polyethylene glycol (GoLYTELY) 236 g solution Take per office instructions 4000 mL 0     promethazine (PHENERGAN) 25 MG tablet TAKE 1 TABLET BY MOUTH EVERY 8 HOURS AS NEEDED FOR NAUSEA AND VOMITING 20 tablet 0     venlafaxine XR (EFFEXOR-XR) 75 MG 24 hr capsule TAKE 1 CAPSULE BY MOUTH ONCE DAILY WITH FOOD 90 capsule 0        Allergies:  Aspirin, Cephalexin, Egg phospholipids, Nsaids, and Penicillins        Objective     Blood pressure 108/65, pulse 62, temperature 97.6 °F (36.4 °C), temperature source Temporal, resp. rate 18, height 172.7 cm (68\"), weight 72.6 kg (160 lb 0.9 oz), SpO2 98%, not currently breastfeeding.    Physical Exam   Constitutional: Pt is oriented to person, place, and time and well-developed, well-nourished, and in no distress.   Mouth/Throat: Oropharynx is clear and moist.   Neck: Normal range of motion.   Cardiovascular: Normal rate, regular rhythm and normal heart sounds.    Pulmonary/Chest: Effort normal and breath sounds normal.   Abdominal: Soft. " Nontender  Skin: Skin is warm and dry.   Psychiatric: Mood, memory, affect and judgment normal.     Assessment & Plan     Diagnosis:  Screening, heartburn, globus sensation    Anticipated Surgical Procedure:  EGD colonoscopy    The risks, benefits, and alternatives of this procedure have been discussed with the patient or the responsible party- the patient understands and agrees to proceed.

## 2024-12-08 DIAGNOSIS — R11.2 NAUSEA AND VOMITING, UNSPECIFIED VOMITING TYPE: ICD-10-CM

## 2024-12-08 DIAGNOSIS — Z91.038 ALLERGIC TO INSECT BITES: ICD-10-CM

## 2024-12-08 DIAGNOSIS — K21.01 GASTROESOPHAGEAL REFLUX DISEASE WITH ESOPHAGITIS AND HEMORRHAGE: ICD-10-CM

## 2024-12-09 LAB
CYTO UR: NORMAL
LAB AP CASE REPORT: NORMAL
LAB AP CLINICAL INFORMATION: NORMAL
PATH REPORT.FINAL DX SPEC: NORMAL
PATH REPORT.GROSS SPEC: NORMAL

## 2024-12-09 RX ORDER — HYDROCORTISONE 25 MG/G
CREAM TOPICAL
Qty: 28 G | Refills: 0 | Status: SHIPPED | OUTPATIENT
Start: 2024-12-09

## 2024-12-09 RX ORDER — OFLOXACIN 3 MG/ML
SOLUTION AURICULAR (OTIC)
Qty: 10 ML | Refills: 0 | Status: SHIPPED | OUTPATIENT
Start: 2024-12-09

## 2024-12-09 RX ORDER — PROMETHAZINE HYDROCHLORIDE 25 MG/1
25 TABLET ORAL EVERY 8 HOURS PRN
Qty: 20 TABLET | Refills: 0 | OUTPATIENT
Start: 2024-12-09

## 2024-12-09 RX ORDER — FAMOTIDINE 40 MG/1
40 TABLET, FILM COATED ORAL DAILY
Qty: 90 TABLET | Refills: 0 | Status: SHIPPED | OUTPATIENT
Start: 2024-12-09

## 2024-12-09 NOTE — PROGRESS NOTES
EGD 12/6/2024: Medium size hiatal hernia, grade B esophagitis-biopsy with reflux esophagitis otherwise negative, normal stomach and normal duodenum    Colonoscopy 12/6/2024: Inadequate prep, stool in the cecum  Repeat colon in 4 months-please troubleshoot prep issues

## 2024-12-17 DIAGNOSIS — R11.2 NAUSEA AND VOMITING, UNSPECIFIED VOMITING TYPE: ICD-10-CM

## 2024-12-17 RX ORDER — PROMETHAZINE HYDROCHLORIDE 25 MG/1
25 TABLET ORAL EVERY 8 HOURS PRN
Qty: 20 TABLET | Refills: 0 | Status: SHIPPED | OUTPATIENT
Start: 2024-12-17

## 2024-12-23 ENCOUNTER — CLINICAL SUPPORT (OUTPATIENT)
Dept: FAMILY MEDICINE CLINIC | Facility: CLINIC | Age: 44
End: 2024-12-23
Payer: COMMERCIAL

## 2024-12-23 DIAGNOSIS — Z30.42 ENCOUNTER FOR SURVEILLANCE OF INJECTABLE CONTRACEPTIVE: Primary | ICD-10-CM

## 2024-12-23 PROCEDURE — 96372 THER/PROPH/DIAG INJ SC/IM: CPT | Performed by: FAMILY MEDICINE

## 2024-12-23 RX ORDER — MEDROXYPROGESTERONE ACETATE 150 MG/ML
150 INJECTION, SUSPENSION INTRAMUSCULAR ONCE
Status: COMPLETED | OUTPATIENT
Start: 2024-12-23 | End: 2024-12-23

## 2024-12-23 RX ADMIN — MEDROXYPROGESTERONE ACETATE 150 MG: 150 INJECTION, SUSPENSION INTRAMUSCULAR at 12:16

## 2024-12-30 DIAGNOSIS — F31.74 BIPOLAR 1 DISORDER, MANIC, FULL REMISSION: ICD-10-CM

## 2024-12-30 RX ORDER — LAMOTRIGINE 200 MG/1
TABLET ORAL
Qty: 180 TABLET | Refills: 0 | Status: SHIPPED | OUTPATIENT
Start: 2024-12-30

## 2024-12-30 NOTE — TELEPHONE ENCOUNTER
Rx Refill Note  Requested Prescriptions     Pending Prescriptions Disp Refills    lamoTRIgine (LaMICtal) 200 MG tablet 90 tablet 0     Sig: Take 1 tablet by mouth Daily for 90 days.      Last office visit with prescribing clinician: 8/29/2024   Last telemedicine visit with prescribing clinician: Visit date not found   Next office visit with prescribing clinician: 2/28/2025                         Would you like a call back once the refill request has been completed: [] Yes [] No    If the office needs to give you a call back, can they leave a voicemail: [] Yes [] No    Scott Saha Rep  12/30/24, 08:19 EST

## 2025-01-24 DIAGNOSIS — Z91.038 ALLERGIC TO INSECT BITES: ICD-10-CM

## 2025-01-24 DIAGNOSIS — R11.2 NAUSEA AND VOMITING, UNSPECIFIED VOMITING TYPE: ICD-10-CM

## 2025-01-24 RX ORDER — PROMETHAZINE HYDROCHLORIDE 25 MG/1
25 TABLET ORAL EVERY 8 HOURS PRN
Qty: 20 TABLET | Refills: 0 | Status: SHIPPED | OUTPATIENT
Start: 2025-01-24

## 2025-01-24 RX ORDER — HYDROCORTISONE 25 MG/G
CREAM TOPICAL
Qty: 28 G | Refills: 0 | Status: SHIPPED | OUTPATIENT
Start: 2025-01-24

## 2025-01-24 RX ORDER — LEVOCETIRIZINE DIHYDROCHLORIDE 5 MG/1
5 TABLET, FILM COATED ORAL EVERY EVENING
Qty: 90 TABLET | Refills: 0 | Status: SHIPPED | OUTPATIENT
Start: 2025-01-24

## 2025-01-27 RX ORDER — LINACLOTIDE 72 UG/1
72 CAPSULE, GELATIN COATED ORAL
Qty: 90 CAPSULE | Refills: 0 | Status: SHIPPED | OUTPATIENT
Start: 2025-01-27

## 2025-02-13 RX ORDER — EPINEPHRINE 0.3 MG/.3ML
INJECTION SUBCUTANEOUS
Qty: 2 EACH | Refills: 0 | Status: SHIPPED | OUTPATIENT
Start: 2025-02-13

## 2025-03-05 DIAGNOSIS — R11.2 NAUSEA AND VOMITING, UNSPECIFIED VOMITING TYPE: ICD-10-CM

## 2025-03-05 DIAGNOSIS — K21.01 GASTROESOPHAGEAL REFLUX DISEASE WITH ESOPHAGITIS AND HEMORRHAGE: ICD-10-CM

## 2025-03-05 DIAGNOSIS — F41.1 GENERALIZED ANXIETY DISORDER: ICD-10-CM

## 2025-03-06 RX ORDER — PROMETHAZINE HYDROCHLORIDE 25 MG/1
25 TABLET ORAL EVERY 8 HOURS PRN
Qty: 20 TABLET | Refills: 0 | Status: SHIPPED | OUTPATIENT
Start: 2025-03-06

## 2025-03-06 RX ORDER — OFLOXACIN 3 MG/ML
SOLUTION AURICULAR (OTIC)
Qty: 10 ML | Refills: 0 | Status: SHIPPED | OUTPATIENT
Start: 2025-03-06

## 2025-03-06 RX ORDER — HYDROXYZINE HYDROCHLORIDE 25 MG/1
25 TABLET, FILM COATED ORAL DAILY PRN
Qty: 90 TABLET | Refills: 0 | Status: SHIPPED | OUTPATIENT
Start: 2025-03-06

## 2025-03-06 RX ORDER — FAMOTIDINE 40 MG/1
40 TABLET, FILM COATED ORAL DAILY
Qty: 90 TABLET | Refills: 0 | Status: SHIPPED | OUTPATIENT
Start: 2025-03-06

## 2025-03-07 ENCOUNTER — OFFICE VISIT (OUTPATIENT)
Dept: FAMILY MEDICINE CLINIC | Facility: CLINIC | Age: 45
End: 2025-03-07
Payer: COMMERCIAL

## 2025-03-07 VITALS
WEIGHT: 169 LBS | HEART RATE: 83 BPM | SYSTOLIC BLOOD PRESSURE: 128 MMHG | TEMPERATURE: 96.1 F | OXYGEN SATURATION: 100 % | BODY MASS INDEX: 25.61 KG/M2 | HEIGHT: 68 IN | DIASTOLIC BLOOD PRESSURE: 80 MMHG

## 2025-03-07 DIAGNOSIS — F33.1 MAJOR DEPRESSIVE DISORDER, RECURRENT, MODERATE: ICD-10-CM

## 2025-03-07 DIAGNOSIS — K21.01 GASTROESOPHAGEAL REFLUX DISEASE WITH ESOPHAGITIS AND HEMORRHAGE: ICD-10-CM

## 2025-03-07 DIAGNOSIS — K59.04 CHRONIC IDIOPATHIC CONSTIPATION: ICD-10-CM

## 2025-03-07 DIAGNOSIS — Z12.31 BREAST CANCER SCREENING BY MAMMOGRAM: Primary | ICD-10-CM

## 2025-03-07 DIAGNOSIS — Z98.82 HX OF BILATERAL BREAST IMPLANTS: ICD-10-CM

## 2025-03-07 RX ORDER — PANTOPRAZOLE SODIUM 40 MG/1
40 TABLET, DELAYED RELEASE ORAL DAILY
Qty: 90 TABLET | Refills: 0 | Status: SHIPPED | OUTPATIENT
Start: 2025-03-07 | End: 2025-06-05

## 2025-03-07 RX ORDER — LINACLOTIDE 145 UG/1
145 CAPSULE, GELATIN COATED ORAL
COMMUNITY
Start: 2025-01-13

## 2025-03-07 NOTE — PROGRESS NOTES
Chief Complaint  Follow-up    Subjective        Elly Barrett presents to [facility]  History of Present Illness  The patient presents for evaluation of colonoscopy, mammogram, esophagitis, and neuroma.    She underwent a colonoscopy, which was not fully successful due to incomplete bowel preparation. She experienced simultaneous vomiting and diarrhea during the process. Despite her efforts to communicate this to Dr. Rodriguez, he insisted that she had not consumed the entire preparation. She has an upcoming appointment with him on 04/27/2025.    She has expressed concerns about potential rupture of her breast implants during a mammogram. She inquired about the necessity of removing her nipple rings for the procedure.    She continues to take her prescribed medication for heartburn. She maintains hydration primarily through water intake, consuming several 40-ounce bottles daily. Her diet includes one cup of coffee at 7:30 AM, as she has noticed that black coffee tends to exacerbate her heartburn symptoms. She has also eliminated spicy foods from her diet. She was previously prescribed Pepcid and Dexilant, but a transition to Protonix was suggested, which she has not yet implemented.    She reports the presence of a small, unusual lesion on her foot, which is palpable and slightly painful. She suspects it may be a cyst. She does not experience any numbness in her toes. The lesion appears to be mobile.    Supplemental Information  She had COVID-19 a couple of months ago, maybe in January 2025. She was very sick and felt like she had a smoker's cough. She had tests for influenza A, B, and COVID-19 on 12/23/2024. She quit soft drinks altogether because of the aspartame. She drinks Pepsi Zero once a week.    SOCIAL HISTORY  She quit smoking.    MEDICATIONS  Current: Pepcid, Dexilant        Medical History: has a past medical history of Abdominal pain, Acid reflux, ADHD, Allergy, Anxiety,  "Asthma, Bipolar disorder, Borderline personality disorder, Chronic idiopathic constipation, Epigastric pain, Hallucinations, HL (hearing loss), Insomnia, Irritable bowel syndrome, Major depressive disorder, Migraines, Panic disorder, PTSD (post-traumatic stress disorder), Self-injurious behavior, Suicidal thoughts, Suicide attempt, and Tobacco use.   Surgical History: has a past surgical history that includes Breast Augmentation ();  section (); Esophagogastroduodenoscopy (); Trigger finger release (Right, ); Trigger finger release (Left, 2024); Esophagogastroduodenoscopy (N/A, 2024); and Colonoscopy (N/A, 2024).   Family History: family history includes Alcohol abuse in her father; Arthritis in her father and another family member; Bipolar disorder in her maternal grandmother; Depression in her maternal grandmother; Diabetes in her mother; Drug abuse in her father; Heart disease in her father and mother; No Known Problems in her sister; Osteoporosis in her father and mother; Stroke in her father.   Social History: reports that she quit smoking about 2 years ago. Her smoking use included cigarettes. She started smoking about 27 years ago. She has a 100 pack-year smoking history. She has been exposed to tobacco smoke. She has never used smokeless tobacco. She reports that she does not currently use alcohol. She reports that she does not currently use drugs.  Immunization History   Administered Date(s) Administered    COVID-19 (MODERNA) 1st,2nd,3rd Dose Monovalent 2021, 2021, 2021, 2021    Tdap 2014       Objective   Vital Signs:  /80   Pulse 83   Temp 96.1 °F (35.6 °C)   Ht 172.7 cm (67.99\")   Wt 76.7 kg (169 lb)   SpO2 100%   BMI 25.70 kg/m²   Estimated body mass index is 25.7 kg/m² as calculated from the following:    Height as of this encounter: 172.7 cm (67.99\").    Weight as of this encounter: 76.7 kg (169 lb).       "       ROS:  Review of Systems   Constitutional:  Negative for fatigue and fever.   HENT:  Negative for congestion, ear pain and sinus pressure.    Respiratory:  Negative for cough, chest tightness and shortness of breath.    Cardiovascular:  Negative for chest pain, palpitations and leg swelling.   Gastrointestinal:  Negative for abdominal pain and diarrhea.   Genitourinary:  Negative for dysuria and frequency.   Neurological:  Negative for speech difficulty, headache and confusion.   Psychiatric/Behavioral:  Negative for agitation and behavioral problems.       Physical Exam  Vitals reviewed.   Constitutional:       Appearance: Normal appearance.   HENT:      Right Ear: Tympanic membrane normal.      Left Ear: Tympanic membrane normal.      Nose: Nose normal.   Eyes:      Extraocular Movements: Extraocular movements intact.      Conjunctiva/sclera: Conjunctivae normal.      Pupils: Pupils are equal, round, and reactive to light.   Cardiovascular:      Rate and Rhythm: Normal rate and regular rhythm.   Pulmonary:      Effort: Pulmonary effort is normal.      Breath sounds: Normal breath sounds.   Abdominal:      General: Bowel sounds are normal.   Musculoskeletal:         General: Normal range of motion.      Cervical back: Normal range of motion.   Skin:     General: Skin is warm and dry.   Neurological:      General: No focal deficit present.      Mental Status: She is alert and oriented to person, place, and time.   Psychiatric:         Mood and Affect: Mood normal.         Behavior: Behavior normal.       Physical Exam  Lungs are clear.  Heart sounds are normal.      Result Review     The following data was reviewed by: Sofie Ku MD on 03/07/2025:  Common labs          4/18/2024    10:28   Common Labs   Glucose 77    BUN 12    Creatinine 0.87    Sodium 142    Potassium 4.1    Chloride 105    Calcium 10.0    Albumin 4.8    Total Bilirubin 0.2    Alkaline Phosphatase 111    AST (SGOT) 19    ALT (SGPT) 14     WBC 6.20    Hemoglobin 13.1    Hematocrit 39.5    Platelets 333      Results  Imaging  Colonoscopy was not fully cleaned out. Esophagitis is still present. Medium sized hiatal hernia observed.             Assessment and Plan     Diagnoses and all orders for this visit:    1. Breast cancer screening by mammogram (Primary)  -     MRI Breast Bilateral Screening With & Without Contrast; Future    2. Hx of bilateral breast implants  -     MRI Breast Bilateral Screening With & Without Contrast; Future    3. Chronic idiopathic constipation  -     Lipid Panel  -     Comprehensive Metabolic Panel  -     CBC Auto Differential    4. Major depressive disorder, recurrent, moderate    5. Gastroesophageal reflux disease with esophagitis and hemorrhage  -     pantoprazole (Protonix) 40 MG EC tablet; Take 1 tablet by mouth Daily for 90 days.  Dispense: 90 tablet; Refill: 0      Assessment & Plan  1. Incomplete colonoscopy.  The patient experienced vomiting during the previous colonoscopy preparation, leading to an incomplete procedure. A modified regimen using Gatorade and MiraLAX is recommended, starting one day before the next scheduled preparation. She is advised to use clear Gatorade to avoid any coloring. A printout of the regimen from the [facility] will be provided.    2. Breast implant evaluation.  The patient is overdue for her breast examination. An MRI is recommended due to her breast implants. She is instructed to remove her nipple rings before the procedure. An order for the MRI will be placed.    3. Esophagitis.  The patient continues to experience symptoms despite being on heartburn medication. The biopsy results indicate squamocolumnar mucosa with changes consistent with reflux esophagitis, but no metaplasia, dysplasia, or malignancy. She has a medium-sized hiatal hernia. The possibility of switching to Protonix was discussed, as her current medications (Pepcid and Dexilant) may not be effectively controlling  her acid production. She is advised to continue avoiding spicy foods and monitor her symptoms.    4. Neuroma.  The patient has a small, movable mass on her foot, which is likely a neuroma. If the mass enlarges or she experiences numbness in her toes, further evaluation will be necessary.    5. Health maintenance.  The patient will have her fasting labs updated during her next visit, which is scheduled for next week. She is advised to fast before the appointment to ensure accurate lab results.    PROCEDURE  The patient underwent a colonoscopy, which was not fully successful due to incomplete bowel preparation.       I spent 35 minutes caring for Elly on this date of service. This time includes time spent by me in the following activities:reviewing tests  Follow Up  Return in about 3 months (around 6/7/2025).  Patient was given instructions and counseling regarding her condition or for health maintenance advice. Please see specific information pulled into the AVS if appropriate.   Patient or patient representative verbalized consent for the use of Ambient Listening during the visit with  Sofie Ku MD for chart documentation. 3/23/2025  10:00 JOHNSON Ku MD

## 2025-03-24 ENCOUNTER — CLINICAL SUPPORT (OUTPATIENT)
Dept: FAMILY MEDICINE CLINIC | Facility: CLINIC | Age: 45
End: 2025-03-24
Payer: COMMERCIAL

## 2025-03-24 DIAGNOSIS — Z30.013 ENCOUNTER FOR INITIAL PRESCRIPTION OF INJECTABLE CONTRACEPTIVE: Primary | ICD-10-CM

## 2025-03-24 DIAGNOSIS — R10.84 GENERALIZED ABDOMINAL PAIN: ICD-10-CM

## 2025-03-24 LAB
ALBUMIN SERPL-MCNC: 4.2 G/DL (ref 3.5–5.2)
ALBUMIN/GLOB SERPL: 1.4 G/DL
ALP SERPL-CCNC: 118 U/L (ref 39–117)
ALT SERPL W P-5'-P-CCNC: 11 U/L (ref 1–33)
ANION GAP SERPL CALCULATED.3IONS-SCNC: 10.3 MMOL/L (ref 5–15)
AST SERPL-CCNC: 19 U/L (ref 1–32)
BASOPHILS # BLD AUTO: 0.03 10*3/MM3 (ref 0–0.2)
BASOPHILS NFR BLD AUTO: 0.5 % (ref 0–1.5)
BILIRUB SERPL-MCNC: 0.2 MG/DL (ref 0–1.2)
BUN SERPL-MCNC: 12 MG/DL (ref 6–20)
BUN/CREAT SERPL: 11.7 (ref 7–25)
CALCIUM SPEC-SCNC: 9.6 MG/DL (ref 8.6–10.5)
CHLORIDE SERPL-SCNC: 104 MMOL/L (ref 98–107)
CHOLEST SERPL-MCNC: 176 MG/DL (ref 0–200)
CO2 SERPL-SCNC: 21.7 MMOL/L (ref 22–29)
CREAT SERPL-MCNC: 1.03 MG/DL (ref 0.57–1)
DEPRECATED RDW RBC AUTO: 43.3 FL (ref 37–54)
EGFRCR SERPLBLD CKD-EPI 2021: 68.5 ML/MIN/1.73
EOSINOPHIL # BLD AUTO: 0.07 10*3/MM3 (ref 0–0.4)
EOSINOPHIL NFR BLD AUTO: 1.1 % (ref 0.3–6.2)
ERYTHROCYTE [DISTWIDTH] IN BLOOD BY AUTOMATED COUNT: 12.6 % (ref 12.3–15.4)
GLOBULIN UR ELPH-MCNC: 3 GM/DL
GLUCOSE SERPL-MCNC: 79 MG/DL (ref 65–99)
HCT VFR BLD AUTO: 34.3 % (ref 34–46.6)
HDLC SERPL-MCNC: 67 MG/DL (ref 40–60)
HGB BLD-MCNC: 11.6 G/DL (ref 12–15.9)
IMM GRANULOCYTES # BLD AUTO: 0.03 10*3/MM3 (ref 0–0.05)
IMM GRANULOCYTES NFR BLD AUTO: 0.5 % (ref 0–0.5)
LDLC SERPL CALC-MCNC: 97 MG/DL (ref 0–100)
LDLC/HDLC SERPL: 1.44 {RATIO}
LYMPHOCYTES # BLD AUTO: 1.75 10*3/MM3 (ref 0.7–3.1)
LYMPHOCYTES NFR BLD AUTO: 27.2 % (ref 19.6–45.3)
MCH RBC QN AUTO: 32.3 PG (ref 26.6–33)
MCHC RBC AUTO-ENTMCNC: 33.8 G/DL (ref 31.5–35.7)
MCV RBC AUTO: 95.5 FL (ref 79–97)
MONOCYTES # BLD AUTO: 0.54 10*3/MM3 (ref 0.1–0.9)
MONOCYTES NFR BLD AUTO: 8.4 % (ref 5–12)
NEUTROPHILS NFR BLD AUTO: 4.01 10*3/MM3 (ref 1.7–7)
NEUTROPHILS NFR BLD AUTO: 62.3 % (ref 42.7–76)
NRBC BLD AUTO-RTO: 0 /100 WBC (ref 0–0.2)
PLATELET # BLD AUTO: 281 10*3/MM3 (ref 140–450)
PMV BLD AUTO: 10.1 FL (ref 6–12)
POTASSIUM SERPL-SCNC: 4.1 MMOL/L (ref 3.5–5.2)
PROT SERPL-MCNC: 7.2 G/DL (ref 6–8.5)
RBC # BLD AUTO: 3.59 10*6/MM3 (ref 3.77–5.28)
SODIUM SERPL-SCNC: 136 MMOL/L (ref 136–145)
TRIGL SERPL-MCNC: 62 MG/DL (ref 0–150)
VLDLC SERPL-MCNC: 12 MG/DL (ref 5–40)
WBC NRBC COR # BLD AUTO: 6.43 10*3/MM3 (ref 3.4–10.8)

## 2025-03-24 PROCEDURE — 80053 COMPREHEN METABOLIC PANEL: CPT | Performed by: FAMILY MEDICINE

## 2025-03-24 PROCEDURE — 85025 COMPLETE CBC W/AUTO DIFF WBC: CPT | Performed by: FAMILY MEDICINE

## 2025-03-24 PROCEDURE — 80061 LIPID PANEL: CPT | Performed by: FAMILY MEDICINE

## 2025-03-24 RX ORDER — MEDROXYPROGESTERONE ACETATE 150 MG/ML
150 INJECTION, SUSPENSION INTRAMUSCULAR ONCE
Status: COMPLETED | OUTPATIENT
Start: 2025-03-24 | End: 2025-03-24

## 2025-03-24 RX ADMIN — MEDROXYPROGESTERONE ACETATE 150 MG: 150 INJECTION, SUSPENSION INTRAMUSCULAR at 12:14

## 2025-04-04 DIAGNOSIS — K21.01 GASTROESOPHAGEAL REFLUX DISEASE WITH ESOPHAGITIS AND HEMORRHAGE: ICD-10-CM

## 2025-04-04 DIAGNOSIS — F31.74 BIPOLAR 1 DISORDER, MANIC, FULL REMISSION: ICD-10-CM

## 2025-04-04 DIAGNOSIS — R11.2 NAUSEA AND VOMITING, UNSPECIFIED VOMITING TYPE: ICD-10-CM

## 2025-04-04 DIAGNOSIS — Z91.038 ALLERGIC TO INSECT BITES: ICD-10-CM

## 2025-04-04 RX ORDER — OFLOXACIN 3 MG/ML
SOLUTION AURICULAR (OTIC)
Qty: 10 ML | Refills: 0 | OUTPATIENT
Start: 2025-04-04

## 2025-04-04 RX ORDER — LEVOCETIRIZINE DIHYDROCHLORIDE 5 MG/1
5 TABLET, FILM COATED ORAL EVERY EVENING
Qty: 90 TABLET | Refills: 0 | Status: SHIPPED | OUTPATIENT
Start: 2025-04-04

## 2025-04-04 RX ORDER — LAMOTRIGINE 200 MG/1
TABLET ORAL
Qty: 180 TABLET | Refills: 0 | Status: SHIPPED | OUTPATIENT
Start: 2025-04-04

## 2025-04-04 RX ORDER — PROMETHAZINE HYDROCHLORIDE 25 MG/1
25 TABLET ORAL EVERY 8 HOURS PRN
Qty: 20 TABLET | Refills: 0 | Status: SHIPPED | OUTPATIENT
Start: 2025-04-04

## 2025-04-04 RX ORDER — HYDROCORTISONE 25 MG/G
CREAM TOPICAL
Qty: 28 G | Refills: 0 | Status: SHIPPED | OUTPATIENT
Start: 2025-04-04

## 2025-04-04 RX ORDER — PANTOPRAZOLE SODIUM 40 MG/1
40 TABLET, DELAYED RELEASE ORAL DAILY
Qty: 90 TABLET | Refills: 0 | Status: SHIPPED | OUTPATIENT
Start: 2025-04-04 | End: 2025-07-03

## 2025-04-08 ENCOUNTER — HOSPITAL ENCOUNTER (OUTPATIENT)
Dept: MRI IMAGING | Facility: HOSPITAL | Age: 45
Discharge: HOME | End: 2025-04-08
Admitting: FAMILY MEDICINE
Payer: COMMERCIAL

## 2025-04-08 DIAGNOSIS — Z12.31 BREAST CANCER SCREENING BY MAMMOGRAM: ICD-10-CM

## 2025-04-08 DIAGNOSIS — Z98.82 HX OF BILATERAL BREAST IMPLANTS: ICD-10-CM

## 2025-04-08 PROCEDURE — 25510000002 GADOBENATE DIMEGLUMINE 529 MG/ML SOLUTION: Performed by: FAMILY MEDICINE

## 2025-04-08 PROCEDURE — 77049 MRI BREAST C-+ W/CAD BI: CPT

## 2025-04-08 PROCEDURE — A9577 INJ MULTIHANCE: HCPCS | Performed by: FAMILY MEDICINE

## 2025-04-08 RX ADMIN — GADOBENATE DIMEGLUMINE 14.8 ML: 529 INJECTION, SOLUTION INTRAVENOUS at 21:16

## 2025-05-04 DIAGNOSIS — R11.2 NAUSEA AND VOMITING, UNSPECIFIED VOMITING TYPE: ICD-10-CM

## 2025-05-04 DIAGNOSIS — Z91.038 ALLERGIC TO INSECT BITES: ICD-10-CM

## 2025-05-06 RX ORDER — PROMETHAZINE HYDROCHLORIDE 25 MG/1
25 TABLET ORAL EVERY 8 HOURS PRN
Qty: 20 TABLET | Refills: 0 | Status: SHIPPED | OUTPATIENT
Start: 2025-05-06

## 2025-05-06 RX ORDER — OFLOXACIN 3 MG/ML
SOLUTION AURICULAR (OTIC)
Qty: 10 ML | Refills: 0 | Status: SHIPPED | OUTPATIENT
Start: 2025-05-06

## 2025-05-06 RX ORDER — HYDROCORTISONE 25 MG/G
CREAM TOPICAL
Qty: 28 G | Refills: 0 | Status: SHIPPED | OUTPATIENT
Start: 2025-05-06

## 2025-05-06 RX ORDER — PROMETHAZINE HYDROCHLORIDE 25 MG/1
25 TABLET ORAL EVERY 8 HOURS PRN
Qty: 20 TABLET | Refills: 0 | OUTPATIENT
Start: 2025-05-06

## 2025-06-03 DIAGNOSIS — R11.2 NAUSEA AND VOMITING, UNSPECIFIED VOMITING TYPE: ICD-10-CM

## 2025-06-04 RX ORDER — LINACLOTIDE 72 UG/1
72 CAPSULE, GELATIN COATED ORAL
Qty: 90 CAPSULE | Refills: 0 | Status: SHIPPED | OUTPATIENT
Start: 2025-06-04

## 2025-06-04 RX ORDER — PROMETHAZINE HYDROCHLORIDE 25 MG/1
25 TABLET ORAL EVERY 8 HOURS PRN
Qty: 20 TABLET | Refills: 0 | Status: SHIPPED | OUTPATIENT
Start: 2025-06-04

## 2025-06-04 NOTE — TELEPHONE ENCOUNTER
Patient requesting a refill of Linzess 72 mcg order pended.  Last o/v- scopes on 12/6/24  Last refill-4/4/25  Next o/v-none

## 2025-06-12 ENCOUNTER — PATIENT ROUNDING (BHMG ONLY) (OUTPATIENT)
Dept: URGENT CARE | Facility: CLINIC | Age: 45
End: 2025-06-12
Payer: COMMERCIAL

## 2025-06-12 NOTE — ED NOTES
Thank you for letting us care for you during your recent visit at [facility]. We would love to hear about your experience with us.         We’re always looking for ways to make our patients’ experiences even better. Do you have any recommendations on ways we may improve?         I appreciate you taking the time to respond. Please be on the lookout for a survey about your recent visit from [facility] via text or email. We would greatly appreciate if you could fill that out and turn it back in. We want your voice to be heard and we value your feedback.         Thank you for choosing [facility] for your healthcare needs.

## 2025-06-22 DIAGNOSIS — R11.2 NAUSEA AND VOMITING, UNSPECIFIED VOMITING TYPE: ICD-10-CM

## 2025-06-22 DIAGNOSIS — K21.01 GASTROESOPHAGEAL REFLUX DISEASE WITH ESOPHAGITIS AND HEMORRHAGE: ICD-10-CM

## 2025-06-22 DIAGNOSIS — Z91.038 ALLERGIC TO INSECT BITES: ICD-10-CM

## 2025-06-23 RX ORDER — OFLOXACIN 3 MG/ML
SOLUTION AURICULAR (OTIC)
Qty: 10 ML | Refills: 0 | Status: SHIPPED | OUTPATIENT
Start: 2025-06-23

## 2025-06-23 RX ORDER — HYDROCORTISONE 25 MG/G
CREAM TOPICAL
Qty: 28 G | Refills: 0 | Status: SHIPPED | OUTPATIENT
Start: 2025-06-23

## 2025-06-23 RX ORDER — FAMOTIDINE 40 MG/1
40 TABLET, FILM COATED ORAL DAILY
Qty: 90 TABLET | Refills: 0 | Status: SHIPPED | OUTPATIENT
Start: 2025-06-23

## 2025-06-24 RX ORDER — PROMETHAZINE HYDROCHLORIDE 25 MG/1
25 TABLET ORAL EVERY 8 HOURS PRN
Qty: 20 TABLET | Refills: 0 | Status: SHIPPED | OUTPATIENT
Start: 2025-06-24

## 2025-06-27 NOTE — PROGRESS NOTES
"   Progress Note      Patient Name: Elly Barrett   Patient ID: [id number]   Sex: Female   YOB: 1980    Primary Care Provider: Sofie Ku MD   Referring Provider: Sofie Ku MD    Visit Date: March 11, 2021    Provider: Stanley Quinones MD   Location: [facility]   Location Address: [address] [place] [address]  [id number]   Location Phone: [phone]          Chief Complaint     \"I am here.\"           History Of Present Illness  Elly Barrett is a 40 year old /White female who presents to the office today referred by Sofie Ku MD.   Chart review 12/15: Seen November 19th. Patient has a history of bipolar disorder. Lost her insurance, so they are trying to find medications that are covered on the $4 medication list. Discontinued Depakote because it made her \"shaky.\" She is on lamotrigine 400 mg p.o. daily, quetiapine 200 nightly, Restoril 30 mg nightly. No longer on diazepam 5 mg 3 times daily. June labs: LFTs within normal limits electrolytes within normal limits creatinine is 0.98. EKG 2018 shows heart rate 72, , sinus. MRI 2017 without contrast for headache shows no acute, with a few small foci of nonspecific gliosis which could be related to migraine disorder or early minimal chronic small vessel ischemic change.   Virtual visit via Zoom audio and video due to the COVID-19 pandemic. Patient is accepting of and agreeable to appointment. The appointment consisted of the patient and I only. Interview: Patient reports she has not yet started olanzapine as she just got the medication today. No change to her symptoms. Not taking Lunesta. Only taking the lamotrigine and the temazepam. Notes increased activity during the day, sleeping about 3 to 4 hours total daily. Notes SI without plan. Denies auditory or visual hallucinations today. No HI.   ...   ...   ...       Past Medical History  Disease Name Date Onset Notes   Abdominal Pain " Patient called to inquire about transferring care for pregnancy.    Reason for transfer: [facility] is closer to home   LMP: 3/12   SERGIO: 12/17/25  Insurance: YES   Insurance active: [employer]   Current OB Provider Name/Location:  [facility] - Alpa Jordan PA-C Obstetrics/Gynecology in [place], [place]  Last OB visit: June 23  Language: Her first language is Tristanian (her  understands English as well)   Patient preference Midwife or Physician: Pt's  states she's interested in seeing either an MD or Midwife     Patient informed to send records to fax [phone].  Asked that the patient check that we have received records after delivery confirmed by current physician.  Patient is aware that she should continue care with her current physician until we schedule her.     --  --    Allergies --  --    Allergy, Unspecified --  allergies   Anxiety --  --    Asthma --  --    Attention Deficit Hyperactivity Disorder --  --    Bipolar Disorder --  psychiatric care   Chronic idiopathic constipation --  --    Constipation --  --    Depression --  --    Depression (emotion) --  --    Epigastric pain --  --    Frequent headaches --  --    Hallucinations --  --    Hemorrhoids, Unspecified, without Complications --  --    Insomnia --  --    Microscopic hematuria 09/10/2018 --    Migraines --  --    Nausea and vomiting --  --    Reflux --  --    Seasonal allergies --  --    Suicidal thoughts --  --    Tobacco abuse --  --          Past Surgical History  Procedure Name Date Notes   Breast augmentation --  --    Cesarian Section 2003 --    EGD 2018 [facility]         Medication List  Name Date Started Instructions   lamotrigine 200 mg oral tablet 11/19/2020 take 2 tablets (400 mg) by oral route once daily for 30 days   olanzapine 10 mg oral tablet 03/02/2021 take 1 tablet (10 mg) by oral route once daily in the evening for 30 days   promethazine 25 mg oral tablet 09/18/2020 Take 1 tablet by mouth once daily for 30 days   Restoril 15 mg oral capsule 02/12/2021 take 1 capsule (15 mg) by oral route once daily at bedtime as needed for 30 days   scopolamine base 1 mg over 3 days transdermal patch 3 day 03/02/2021 apply 1 patch by transdermal route to the hairless area behind 1 ear reapply every 3 days as needed         Allergy List  Allergen Name Date Reaction Notes   aspirin --  --  --    Egg --  --  --    Keflex --  --  Blisters on face   NSAIDS --  --  --    PENICILLINS --  --  --          Family Medical History  Disease Name Relative/Age Notes   Heart Disease Father/   Father  Mother   CVA (Cerebrovascular accident) Father/   --    Diabetes, unspecified type Mother/   Mother   Osteoporosis Father/  Mother/   Mother; Father   No family history of colorectal cancer  --    Family history of Arthritis  Father/   Father         Reproductive History  Menstrual   Last Menstrual Period: 2015   Pregnancy Summary   Total Pregnancies: 0 Full Term: 0 Premature: 0   Ab Induced: 0 Ab Spontaneous: 0 Ectopics: 0   Multiples: 0 Livin         Social History  Finding Status Start/Stop Quantity Notes   Alcohol Never --/-- --  2021 - 2021 - 12/15/2020 -    Alcohol Use Current - status unknown --/-- --  less than 1 drink per day   Claustophobic Unknown --/-- --  yes   Homemaker. --  --/-- --  --    lives with children --  --/-- --  --    lives with spouse --  --/-- --  --     --  --/-- --  --    . --  --/-- --  --    Recreational Drug Use Never --/-- --  2021 - 2021 - 12/15/2020 - no   Tobacco Current every day --/-- 1.5 PPD current every day smoker, 1.5 packs per day, smoked 11-20 years  current every day smoker, 1 packs per day, smoked 11-20 years   Unemployed. --  --/-- --  --          Immunizations  NameDate Admin Mfg Trade Name Lot Number Route Inj VIS Given VIS Publication   InfluenzaHad Disease  NE Not Entered  NE NE     Comments: declined         Review of Systems  · Constitutional  o Denies  o : fatigue, night sweats  · Eyes  o Denies  o : double vision, blurred vision  · HENT  o Denies  o : vertigo, recent head injury  · Breasts  o Denies  o : abnormal changes in breast size, additional breast symptoms except as noted in the HPI  · Cardiovascular  o Admits  o : irregular heart beats  o Denies  o : chest pain  · Respiratory  o Denies  o : shortness of breath, productive cough  · Gastrointestinal  o Admits  o : nausea, vomiting  · Genitourinary  o Denies  o : dysuria, urinary retention  · Integument  o Denies  o : hair growth change, new skin lesions  · Neurologic  o Admits  o : altered mental status  o Denies  o : seizures  · Musculoskeletal  o Denies  o : joint swelling, limitation of motion  · Endocrine  o Denies  o : cold intolerance, heat  "intolerance  · Heme-Lymph  o Denies  o : petechiae, lymph node enlargement or tenderness  · Allergic-Immunologic  o Denies  o : frequent illnesses      Physical Examination  · Mental Status Exam  o Appearance  o : good eye contact, normal street clothes, groomed, sitting in a room in her house  o Behavior  o : pleasant and cooperative  o Motor  o : No abnormal  o Speech  o : normal rhythm, rate, volume, tone, not hyperverbal, not pressured, normal prosidy  o Mood  o : \"I am here\"  o Affect  o : Patient does not appear depressed. Slight constriction, able to smile, no tears  o Thought Content  o : negative suicidal ideations, negative homicidal ideations, negative obsessions  o Perceptions  o : negative auditory hallucinations, negative visual hallucinations  o Thought Process  o : linear  o Insight/Judgement  o : fair/fair  o Cognition  o : grossly intact  o Attention  o : intact          Assessment  · Anxiety     300.02/F41.1  · Depression     296.31  · Tobacco abuse     305.1/Z72.0       Presentation most consistent with bipolar 1, current episode manic, without psychotic features, as well as complicated bereavement. Consider cluster B, histrionic, narcissistic, borderline.  Presentation is very unusual.    I had a conversation with Dr. Ku recently and the patient reported continued christopher to her.  Patient also reported that she was not taking the Seroquel or the Lunesta.    Start olanzapine.  Patient will follow-up call to me next week on Monday.  If no improvement, will increase the dose of olanzapine.  Patient will then call me on Friday.  If no improvement at that time, we will start lithium.    Continue temazepam, lamotrigine, melatonin.    Follow-up in 4 wks.    Patient declines psychotherapy.       Plan  · Orders  o ACO-39: Current medications updated and reviewed (1159F, ) - - 03/11/2021  · Medications  o Medications have been Reconciled  o Transition of Care or Provider " Policy  · Instructions  o Patient understands to call 911 or go to the emergency room if for any reason she has thoughts of hurting herself or other people. Reiterated with her again today.  o Risk Assessment: Risk of self-harm acutely is high, not imminent. Risk factors include severe recent psychosocial stressor, mood disorder, access to weapons, history of self-harm and suicide attempt, recent self-harm. Protective factors include no family history, no present SI, minimal AODA, healthcare seeking, future orientation, willingness to engage in care, support from her daughter. Risk of self-harm chronically is also high, but could be further elevated in the event of treatment noncompliance and/or AODA.  o Chronic Risk:   o Safety: No acute safety concerns.  o Medications: CONTINUE melatonin 3 mg PO QHS. CONTINUE lamotrigine 400 mg p.o. daily. START olanzapine 10 mg PO QHS. Risks, benefits, alternatives discussed with patient including nausea and vomiting, GI upset, sedation, akathisia, hypotension, increased appetite, lowering of seizure threshold, theoretical risk of tardive dyskinesia. After discussion of these risks and benefits, the patient voiced understanding and agreed to proceed. CONTINUE temazepam 30 mg PO QHS. *** S/P trazodone for insomnia (no effect), mirtazapine (no effect), quetiapine (didn't take), eszopiclone (no effect).  o Therapy: Declines.  o Follow Up: 4 week. Patient agrees to call on Monday and Friday of next week.  · Disposition  o Call or Return if symptoms worsen or persist.  o Follow Up PRN.  o Follow Up in 1 month.            Electronically Signed by: Stanley Quinones MD -Author on March 11, 2021 01:45:55 PM

## 2025-06-30 ENCOUNTER — HOSPITAL ENCOUNTER (OUTPATIENT)
Dept: GENERAL RADIOLOGY | Facility: HOSPITAL | Age: 45
Discharge: HOME | End: 2025-06-30
Payer: COMMERCIAL

## 2025-06-30 ENCOUNTER — LAB (OUTPATIENT)
Dept: LAB | Facility: HOSPITAL | Age: 45
End: 2025-06-30
Payer: COMMERCIAL

## 2025-06-30 ENCOUNTER — OFFICE VISIT (OUTPATIENT)
Dept: FAMILY MEDICINE CLINIC | Facility: CLINIC | Age: 45
End: 2025-06-30
Payer: COMMERCIAL

## 2025-06-30 ENCOUNTER — TRANSCRIBE ORDERS (OUTPATIENT)
Dept: LAB | Facility: HOSPITAL | Age: 45
End: 2025-06-30
Payer: COMMERCIAL

## 2025-06-30 VITALS
OXYGEN SATURATION: 98 % | SYSTOLIC BLOOD PRESSURE: 120 MMHG | TEMPERATURE: 98.2 F | WEIGHT: 170 LBS | HEIGHT: 68 IN | BODY MASS INDEX: 25.76 KG/M2 | DIASTOLIC BLOOD PRESSURE: 64 MMHG | HEART RATE: 85 BPM

## 2025-06-30 DIAGNOSIS — M25.361 INSTABILITY OF RIGHT KNEE JOINT: ICD-10-CM

## 2025-06-30 DIAGNOSIS — R23.3 EASY BRUISING: ICD-10-CM

## 2025-06-30 DIAGNOSIS — Z00.00 ANNUAL PHYSICAL EXAM: ICD-10-CM

## 2025-06-30 DIAGNOSIS — D64.9 ANEMIA, UNSPECIFIED TYPE: Primary | ICD-10-CM

## 2025-06-30 DIAGNOSIS — Z30.42 ENCOUNTER FOR SURVEILLANCE OF INJECTABLE CONTRACEPTIVE: ICD-10-CM

## 2025-06-30 LAB
ALBUMIN SERPL-MCNC: 4.4 G/DL (ref 3.5–5.2)
ALBUMIN/GLOB SERPL: 1.5 G/DL
ALP SERPL-CCNC: 114 U/L (ref 39–117)
ALT SERPL W P-5'-P-CCNC: 20 U/L (ref 1–33)
ANION GAP SERPL CALCULATED.3IONS-SCNC: 13.5 MMOL/L (ref 5–15)
AST SERPL-CCNC: 30 U/L (ref 1–32)
B-HCG UR QL: NEGATIVE
BASOPHILS # BLD AUTO: 0.02 10*3/MM3 (ref 0–0.2)
BASOPHILS NFR BLD AUTO: 0.3 % (ref 0–1.5)
BILIRUB SERPL-MCNC: 0.5 MG/DL (ref 0–1.2)
BUN SERPL-MCNC: 18 MG/DL (ref 6–20)
BUN/CREAT SERPL: 18.8 (ref 7–25)
CALCIUM SPEC-SCNC: 9.4 MG/DL (ref 8.6–10.5)
CHLORIDE SERPL-SCNC: 104 MMOL/L (ref 98–107)
CO2 SERPL-SCNC: 19.5 MMOL/L (ref 22–29)
CREAT SERPL-MCNC: 0.96 MG/DL (ref 0.57–1)
DEPRECATED RDW RBC AUTO: 45.7 FL (ref 37–54)
EGFRCR SERPLBLD CKD-EPI 2021: 74.5 ML/MIN/1.73
EOSINOPHIL # BLD AUTO: 0.06 10*3/MM3 (ref 0–0.4)
EOSINOPHIL NFR BLD AUTO: 1 % (ref 0.3–6.2)
ERYTHROCYTE [DISTWIDTH] IN BLOOD BY AUTOMATED COUNT: 13 % (ref 12.3–15.4)
EXPIRATION DATE: NORMAL
FERRITIN SERPL-MCNC: 99.8 NG/ML (ref 13–150)
FOLATE SERPL-MCNC: 11.3 NG/ML (ref 4.78–24.2)
GLOBULIN UR ELPH-MCNC: 3 GM/DL
GLUCOSE SERPL-MCNC: 117 MG/DL (ref 65–99)
HCT VFR BLD AUTO: 37.2 % (ref 34–46.6)
HGB BLD-MCNC: 12.4 G/DL (ref 12–15.9)
IMM GRANULOCYTES # BLD AUTO: 0.02 10*3/MM3 (ref 0–0.05)
IMM GRANULOCYTES NFR BLD AUTO: 0.3 % (ref 0–0.5)
INTERNAL NEGATIVE CONTROL: NORMAL
INTERNAL POSITIVE CONTROL: NORMAL
IRON 24H UR-MRATE: 93 MCG/DL (ref 37–145)
IRON SATN MFR SERPL: 22 % (ref 20–50)
LYMPHOCYTES # BLD AUTO: 1.15 10*3/MM3 (ref 0.7–3.1)
LYMPHOCYTES NFR BLD AUTO: 18.5 % (ref 19.6–45.3)
Lab: NORMAL
MCH RBC QN AUTO: 32.1 PG (ref 26.6–33)
MCHC RBC AUTO-ENTMCNC: 33.3 G/DL (ref 31.5–35.7)
MCV RBC AUTO: 96.4 FL (ref 79–97)
MONOCYTES # BLD AUTO: 0.33 10*3/MM3 (ref 0.1–0.9)
MONOCYTES NFR BLD AUTO: 5.3 % (ref 5–12)
NEUTROPHILS NFR BLD AUTO: 4.62 10*3/MM3 (ref 1.7–7)
NEUTROPHILS NFR BLD AUTO: 74.6 % (ref 42.7–76)
NRBC BLD AUTO-RTO: 0 /100 WBC (ref 0–0.2)
PLATELET # BLD AUTO: 275 10*3/MM3 (ref 140–450)
PMV BLD AUTO: 9.6 FL (ref 6–12)
POTASSIUM SERPL-SCNC: 3.6 MMOL/L (ref 3.5–5.2)
PROT SERPL-MCNC: 7.4 G/DL (ref 6–8.5)
RBC # BLD AUTO: 3.86 10*6/MM3 (ref 3.77–5.28)
SODIUM SERPL-SCNC: 137 MMOL/L (ref 136–145)
TIBC SERPL-MCNC: 426 MCG/DL (ref 298–536)
TRANSFERRIN SERPL-MCNC: 286 MG/DL (ref 200–360)
VIT B12 BLD-MCNC: 422 PG/ML (ref 211–946)
WBC NRBC COR # BLD AUTO: 6.2 10*3/MM3 (ref 3.4–10.8)

## 2025-06-30 PROCEDURE — 1126F AMNT PAIN NOTED NONE PRSNT: CPT | Performed by: FAMILY MEDICINE

## 2025-06-30 PROCEDURE — 80053 COMPREHEN METABOLIC PANEL: CPT | Performed by: FAMILY MEDICINE

## 2025-06-30 PROCEDURE — 73562 X-RAY EXAM OF KNEE 3: CPT

## 2025-06-30 PROCEDURE — 99396 PREV VISIT EST AGE 40-64: CPT | Performed by: FAMILY MEDICINE

## 2025-06-30 PROCEDURE — 96372 THER/PROPH/DIAG INJ SC/IM: CPT | Performed by: FAMILY MEDICINE

## 2025-06-30 PROCEDURE — 82746 ASSAY OF FOLIC ACID SERUM: CPT | Performed by: FAMILY MEDICINE

## 2025-06-30 PROCEDURE — 81025 URINE PREGNANCY TEST: CPT | Performed by: FAMILY MEDICINE

## 2025-06-30 PROCEDURE — 82728 ASSAY OF FERRITIN: CPT | Performed by: FAMILY MEDICINE

## 2025-06-30 PROCEDURE — 84466 ASSAY OF TRANSFERRIN: CPT | Performed by: FAMILY MEDICINE

## 2025-06-30 PROCEDURE — 82607 VITAMIN B-12: CPT | Performed by: FAMILY MEDICINE

## 2025-06-30 PROCEDURE — 83540 ASSAY OF IRON: CPT | Performed by: FAMILY MEDICINE

## 2025-06-30 PROCEDURE — 85025 COMPLETE CBC W/AUTO DIFF WBC: CPT | Performed by: FAMILY MEDICINE

## 2025-06-30 RX ORDER — MEDROXYPROGESTERONE ACETATE 150 MG/ML
150 INJECTION, SUSPENSION INTRAMUSCULAR ONCE
Status: COMPLETED | OUTPATIENT
Start: 2025-06-30 | End: 2025-06-30

## 2025-06-30 RX ADMIN — MEDROXYPROGESTERONE ACETATE 150 MG: 150 INJECTION, SUSPENSION INTRAMUSCULAR at 11:15

## 2025-06-30 NOTE — PROGRESS NOTES
Chief Complaint  Annual Exam, Leg cramps, and bruising easily    Subjective        Elly Barrett presents to [facility]  History of Present Illness  The patient presents for evaluation of bruising, acid reflux, right knee pain, and ankle swelling.    She reports an overall improvement in her health following a colonoscopy, during which she was advised to use MiraLAX and Gatorade. Regular bowel movements every other day have been noted since then. However, frequent bruising remains unexplained, and she expresses interest in getting blood work done. Her diet primarily consists of vegetables, with a preference for chicken over other meats. She avoids steak due to its slow digestion process and limits her pork intake.    A severe episode of acid reflux was managed by eliminating all potential triggers from her diet and reintroducing them one at a time. This process led her to identify soft drinks as the culprit. She has abstained from soft drinks for the past 3 months.    For the past 2 months, knee instability has been causing occasional falls. She experienced a cramp yesterday morning and was running around the house trying to hop around on one foot, attempting to put pressure on it. The cramp sensation persists. No known injury to the knee is reported, and she has not had an x-ray of her knee.    Additionally, swelling in the ankle and knee is reported, which she attributes to a bug bite that occurred approximately a week ago.    She is currently on Depo-Provera injections and is curious about how she would know if she is entering perimenopause.        Medical History: has a past medical history of Abdominal pain, Acid reflux, ADHD, Allergy, Anxiety, Asthma, Bipolar disorder, Borderline personality disorder, Chronic idiopathic constipation, Epigastric pain, Hallucinations, HL (hearing loss), Insomnia, Irritable bowel syndrome, Major depressive disorder, Migraines, Panic disorder,  "PTSD (post-traumatic stress disorder), Self-injurious behavior, Suicidal thoughts, Suicide attempt, and Tobacco use.   Surgical History: has a past surgical history that includes Breast Augmentation ();  section (); Esophagogastroduodenoscopy (); Trigger finger release (Right, ); Trigger finger release (Left, 2024); Esophagogastroduodenoscopy (N/A, 2024); and Colonoscopy (N/A, 2024).   Family History: family history includes Alcohol abuse in her father; Arthritis in her father and another family member; Bipolar disorder in her maternal grandmother; Depression in her maternal grandmother; Diabetes in her mother; Drug abuse in her father; Heart disease in her father and mother; No Known Problems in her sister; Osteoporosis in her father and mother; Stroke in her father.   Social History: reports that she quit smoking about 2 years ago. Her smoking use included cigarettes. She started smoking about 27 years ago. She has a 100 pack-year smoking history. She has been exposed to tobacco smoke. She has never used smokeless tobacco. She reports that she does not currently use alcohol. She reports that she does not currently use drugs.  Immunization History   Administered Date(s) Administered    COVID-19 (MODERNA) 1st,2nd,3rd Dose Monovalent 2021, 2021, 2021, 2021    Tdap 2014       Objective   Vital Signs:  /64 (BP Location: Right arm)   Pulse 85   Temp 98.2 °F (36.8 °C)   Ht 172.7 cm (68\")   Wt 77.1 kg (170 lb)   SpO2 98%   BMI 25.85 kg/m²   Estimated body mass index is 25.85 kg/m² as calculated from the following:    Height as of this encounter: 172.7 cm (68\").    Weight as of this encounter: 77.1 kg (170 lb).             ROS:  Review of Systems   Constitutional:  Negative for fatigue and fever.   HENT:  Negative for congestion, ear pain and sinus pressure.    Respiratory:  Negative for cough, chest tightness and shortness of breath.  "   Cardiovascular:  Negative for chest pain, palpitations and leg swelling.   Gastrointestinal:  Negative for abdominal pain and diarrhea.   Genitourinary:  Negative for dysuria and frequency.   Neurological:  Negative for speech difficulty, headache and confusion.   Psychiatric/Behavioral:  Negative for agitation and behavioral problems.       Physical Exam  Vitals reviewed.   Constitutional:       Appearance: Normal appearance.   HENT:      Right Ear: Tympanic membrane normal.      Left Ear: Tympanic membrane normal.      Nose: Nose normal.   Eyes:      Extraocular Movements: Extraocular movements intact.      Conjunctiva/sclera: Conjunctivae normal.      Pupils: Pupils are equal, round, and reactive to light.   Cardiovascular:      Rate and Rhythm: Normal rate and regular rhythm.   Pulmonary:      Effort: Pulmonary effort is normal.      Breath sounds: Normal breath sounds.   Abdominal:      General: Bowel sounds are normal.   Musculoskeletal:         General: Normal range of motion.      Cervical back: Normal range of motion.   Skin:     General: Skin is warm and dry.   Neurological:      General: No focal deficit present.      Mental Status: She is alert and oriented to person, place, and time.   Psychiatric:         Mood and Affect: Mood normal.         Behavior: Behavior normal.       Physical Exam  Respiratory: Clear to auscultation, no wheezing, rales or rhonchi  Cardiovascular: Regular rate and rhythm, no murmurs, rubs, or gallops  Musculoskeletal: Right knee shows signs of swelling and tenderness, with reported episodes of buckling and giving out.  Skin: Redness and swelling around a bug bite on the ankle.      Result Review   The following data was reviewed by: Sofie Ku MD on 06/30/2025:  Common labs          3/24/2025    10:51 6/30/2025    11:55   Common Labs   Glucose 79     BUN 12     Creatinine 1.03     Sodium 136     Potassium 4.1     Chloride 104     Calcium 9.6     Albumin 4.2     Total  Bilirubin 0.2     Alkaline Phosphatase 118     AST (SGOT) 19     ALT (SGPT) 11     WBC 6.43  6.20    Hemoglobin 11.6  12.4    Hematocrit 34.3  37.2    Platelets 281  275    Total Cholesterol 176     Triglycerides 62     HDL Cholesterol 67     LDL Cholesterol  97       Results  Imaging   - Breast MRI: Normal             Assessment and Plan    Diagnoses and all orders for this visit:    1. Anemia, unspecified type (Primary)  -     Iron Profile w/o Ferritin  -     CBC Auto Differential  -     Ferritin  -     Comprehensive Metabolic Panel  -     Vitamin B12 & Folate    2. Easy bruising  -     Iron Profile w/o Ferritin  -     CBC Auto Differential  -     Ferritin    3. Instability of right knee joint  -     XR Knee 3 View Right; Future  -     MRI Knee Right Without Contrast; Future  -     Ambulatory Referral to Orthopedic Surgery    4. Encounter for surveillance of injectable contraceptive  -     POCT pregnancy, urine  -     medroxyPROGESTERone (DEPO-PROVERA) injection 150 mg    5. Annual physical exam      Assessment & Plan  1. Anemia.  - Previous lab results indicated mild anemia, the cause of which was not determined.  - Comprehensive workup for anemia will be conducted, including liver function assessment due to its role in clotting factor production.  - Discussed the need for blood work to investigate the cause of bruising and anemia.  - Labs will be repeated to check for bruising issues and liver function.    2. Acid reflux.  - Significant improvement in symptoms reported after eliminating soft drinks from the diet for the past 3 months.  - No current symptoms of acid reflux.  - Advised to continue avoiding soft drinks and monitor symptoms.  - Counseling provided on dietary modifications to manage symptoms.    3. Right knee pain.  - Patient reports knee swelling and buckling, which started about 2 months ago.  - Physical exam did not reveal crepitus or crunching sounds, raising concerns about a potential tear.  -  X-ray and MRI of the knee will be ordered.  - Referral to Dr. Stahl, an orthopedic specialist, will be made for further evaluation.    4. Ankle swelling.  - Swelling noted in the ankle, possibly due to inflammation or fluid accumulation.  - Presence of a bug bite may have triggered the inflammation.  - Advised to monitor the swelling and report any changes.  - Discussed the potential need for further evaluation if symptoms persist.       I spent 35 minutes caring for Elly on this date of service. This time includes time spent by me in the following activities:reviewing tests  Follow Up   Return in about 6 months (around 12/30/2025).  Patient was given instructions and counseling regarding her condition or for health maintenance advice. Please see specific information pulled into the AVS if appropriate.   Patient or patient representative verbalized consent for the use of Ambient Listening during the visit with  Sofie Ku MD for chart documentation. 6/30/2025  08:42 EDT    Sofie Ku MD

## 2025-07-01 ENCOUNTER — TELEPHONE (OUTPATIENT)
Dept: FAMILY MEDICINE CLINIC | Facility: CLINIC | Age: 45
End: 2025-07-01

## 2025-07-01 NOTE — TELEPHONE ENCOUNTER
Caller: Elly Barrett    Relationship: Self    Best call back number: [phone]     What medication are you requesting: MUSCLE RELAXER    What are your current symptoms: CALF CRAMPS AND CALF SWELLING     If a prescription is needed, what is your preferred pharmacy and phone number: [facility] [address] [place] [address] [facility] [phone] [facility] [phone]      Additional notes:    PATIENT STATES SHE WOKE UP AND HER CALF WAS VERY SWOLLEN AND THEY'RE UNABLE TO MOVE THEIR LEG MUCH DUE TO THE CRAMPING AND SWELLING.

## 2025-07-04 DIAGNOSIS — F41.1 GENERALIZED ANXIETY DISORDER: ICD-10-CM

## 2025-07-07 RX ORDER — HYDROXYZINE HYDROCHLORIDE 25 MG/1
TABLET, FILM COATED ORAL
Qty: 90 TABLET | Refills: 0 | Status: SHIPPED | OUTPATIENT
Start: 2025-07-07

## 2025-07-15 DIAGNOSIS — M79.671 RIGHT FOOT PAIN: Primary | ICD-10-CM

## 2025-07-21 ENCOUNTER — HOSPITAL ENCOUNTER (OUTPATIENT)
Dept: MRI IMAGING | Facility: HOSPITAL | Age: 45
Discharge: HOME | End: 2025-07-21
Payer: COMMERCIAL

## 2025-07-21 ENCOUNTER — HOSPITAL ENCOUNTER (OUTPATIENT)
Dept: GENERAL RADIOLOGY | Facility: HOSPITAL | Age: 45
Discharge: HOME | End: 2025-07-21
Payer: COMMERCIAL

## 2025-07-21 DIAGNOSIS — M25.361 INSTABILITY OF RIGHT KNEE JOINT: ICD-10-CM

## 2025-07-21 DIAGNOSIS — M79.671 RIGHT FOOT PAIN: ICD-10-CM

## 2025-07-21 PROCEDURE — 73721 MRI JNT OF LWR EXTRE W/O DYE: CPT

## 2025-07-21 PROCEDURE — 73630 X-RAY EXAM OF FOOT: CPT

## 2025-07-24 ENCOUNTER — OFFICE VISIT (OUTPATIENT)
Dept: ORTHOPEDIC SURGERY | Facility: CLINIC | Age: 45
End: 2025-07-24
Payer: COMMERCIAL

## 2025-07-24 VITALS
HEIGHT: 68 IN | WEIGHT: 160 LBS | OXYGEN SATURATION: 98 % | BODY MASS INDEX: 24.25 KG/M2 | SYSTOLIC BLOOD PRESSURE: 123 MMHG | DIASTOLIC BLOOD PRESSURE: 82 MMHG | HEART RATE: 76 BPM

## 2025-07-24 DIAGNOSIS — M79.671 RIGHT FOOT PAIN: ICD-10-CM

## 2025-07-24 DIAGNOSIS — M22.2X1 PFD (PATELLOFEMORAL DISORDER), RIGHT: ICD-10-CM

## 2025-07-24 DIAGNOSIS — M25.561 RIGHT KNEE PAIN, UNSPECIFIED CHRONICITY: Primary | ICD-10-CM

## 2025-07-24 RX ORDER — NAPROXEN 500 MG/1
500 TABLET ORAL 2 TIMES DAILY
Qty: 60 TABLET | Refills: 1 | Status: SHIPPED | OUTPATIENT
Start: 2025-07-24

## 2025-07-24 RX ORDER — PANTOPRAZOLE SODIUM 40 MG/1
1 TABLET, DELAYED RELEASE ORAL DAILY
COMMUNITY
Start: 2025-06-04 | End: 2025-07-28

## 2025-07-25 DIAGNOSIS — F31.74 BIPOLAR 1 DISORDER, MANIC, FULL REMISSION: ICD-10-CM

## 2025-07-25 DIAGNOSIS — R11.2 NAUSEA AND VOMITING, UNSPECIFIED VOMITING TYPE: ICD-10-CM

## 2025-07-25 DIAGNOSIS — Z91.038 ALLERGIC TO INSECT BITES: ICD-10-CM

## 2025-07-25 DIAGNOSIS — K21.01 GASTRO-ESOPHAGEAL REFLUX DISEASE WITH ESOPHAGITIS, WITH BLEEDING: ICD-10-CM

## 2025-07-25 NOTE — TELEPHONE ENCOUNTER
Medications failed MA refill protocol.   Medication prescribed by historical provider please advise.

## 2025-07-25 NOTE — PROGRESS NOTES
"Chief Complaint  Initial Evaluation of the Right Knee       Subjective      Elly Barrett presents to [facility] for an evaluation  of her right knee. She states she developed some pretty severe cramps for four days and then noticed increase in pain to her right knee. She locates her pain underneath her kneecap. She denies any specific injury, trauma, falls or prior surgery to her right knee.     Allergies   Allergen Reactions    Hydrocodone Unknown - Low Severity     Previous abuse     Aspirin Hives    Cephalexin Hives    Egg Phospholipids Hives    Nsaids Hives    Penicillins Hives        Social History     Socioeconomic History    Marital status:    Tobacco Use    Smoking status: Former     Current packs/day: 0.00     Average packs/day: 2.3 packs/day for 43.8 years (100.2 ttl pk-yrs)     Types: Cigarettes     Start date: 1997     Quit date: 2022     Years since quittin.9     Passive exposure: Past    Smokeless tobacco: Never    Tobacco comments:     SMOKED 11-20 YEARS   Vaping Use    Vaping status: Former    Substances: Nicotine    Devices: Disposable   Substance and Sexual Activity    Alcohol use: Not Currently    Drug use: Not Currently     Comment: rarely    Sexual activity: Yes     Partners: Male     Birth control/protection: Injection        I reviewed the patient's chief complaint, history of present illness, review of systems, past medical history, surgical history, family history, social history, medications, and allergy list.     Review of Systems     Constitutional: Denies fevers, chills, weight loss  Cardiovascular: Denies chest pain, shortness of breath  Skin: Denies rashes, acute skin changes  Neurologic: Denies headache, loss of consciousness  MSK: right knee pain       Vital Signs:   /82   Pulse 76   Ht 172.7 cm (68\")   Wt 72.6 kg (160 lb)   SpO2 98%   BMI 24.33 kg/m²          Physical Exam  General: Alert. No acute distress    Ortho " Exam        Right lower extremity: she comes in full weight bearing, full extension, flexion  to 120 degrees, stable to varus/valgus stress, stable to anterior/posterior drawer , patella crepitus with range of motion, tender to palpation to the patella, non tender to the medial joint line  and lateral joint line, calf soft, negative  Lachman's and Daisy's, distal neurovascularly intact, positive  pulses, positive EHL, FHL, GS, and TA. Sensation intact to all 5 nerves of the foot.       Procedures        Imaging Results (Most Recent)       None             Result Review :       MRI Knee Right Without Contrast  Result Date: 7/22/2025  Narrative: MRI KNEE RIGHT  WO CONTRAST-  HISTORY: Chronic right knee pain. Instability. Swelling for 6 months.  TECHNIQUE:  MRI right knee includes axial and coronal PD fat-sat as well as sagittal PD, T1, T2-weighted sequences.  COMPARISON: Right knee x-rays 06/30/2025.  FINDINGS: The medial and lateral meniscus are intact. The cruciate ligaments, collateral ligament complexes, extensor mechanism are intact.  There is mild patellofemoral arthritis. Cartilage thinning and surface irregularity at the medial trochlear notch and at the median ridge of the patella. Mild subchondral marrow edema deep to the trochlear notch. The medial and lateral compartment articular cartilage is mostly preserved. Periarticular musculature appears normal. Knee joint fluid volume is upper normal. Proximal tibiofibular joint appears normal.      Impression: 1. Mild patellofemoral arthritis with cartilage thinning at the medial trochlear notch where there is minimal subchondral edema. 2. No evidence for meniscal tear or other internal derangement. Knee joint fluid volume is upper normal.    This report was finalized on 7/22/2025 9:04 AM by Mars Joaquin M.D on Workstation: [id number]      XR Foot 3+ View Right  Result Date: 7/21/2025  Narrative: XR FOOT 3+ VW RIGHT-  Clinical: Foot pain medial bony  prominence  FINDINGS: There is prominence of the medial aspect of the navicular secondary to partially fused os trigonum. Hind, mid and forefoot articulations have a normal appearance. Surrounding soft tissues normal. No acute osseous abnormality. Remainder is unremarkable.  This report was finalized on 7/21/2025 3:49 PM by Dr. Wayne Cross M.D on Workstation: [id number]      XR Knee 3 View Right  Result Date: 7/1/2025  Narrative: X-RAY KNEE THREE-VIEW RIGHT  CLINICAL INFORMATION: Pain and instability.  FINDINGS: There is mild medial compartment narrowing; lateral compartment width preserved. The patellofemoral articulation is normal. No joint effusion or acute osseous abnormality is seen.  CONCLUSION: Nominal medial compartment narrowing.  This report was finalized on 7/1/2025 3:12 PM by Dr. Wayne Cross M.D on Workstation: [email or web address]               Assessment and Plan     Diagnoses and all orders for this visit:    1. Right knee pain, unspecified chronicity (Primary)  -     naproxen (NAPROSYN) 500 MG tablet; Take 1 tablet by mouth 2 (Two) Times a Day.  Dispense: 60 tablet; Refill: 1    2. Right foot pain  -     Ambulatory Referral to Podiatry    3. PFD (patellofemoral disorder), right      The patient presents here today for an evaluation  of her right knee.      Home exercises given today and Naproxen sent into the pharmacy today.     Referral placed today for podiatry.     Call or return if worsening symptoms.    Follow Up     PRN     Patient was given instructions and counseling regarding her condition or for health maintenance advice. Please see specific information pulled into the AVS if appropriate.     TransScribed for Renan Redding MD by Trinidad Engel.  07/24/25   20:51 EDT        I have personally performed the services described in this document as scribed by the above individual and it is both accurate and complete. Renan Redding MD 07/25/25

## 2025-07-28 RX ORDER — LAMOTRIGINE 200 MG/1
TABLET ORAL
Qty: 180 TABLET | Refills: 0 | Status: SHIPPED | OUTPATIENT
Start: 2025-07-28

## 2025-07-28 RX ORDER — OFLOXACIN 3 MG/ML
SOLUTION AURICULAR (OTIC)
Qty: 10 ML | Refills: 0 | Status: SHIPPED | OUTPATIENT
Start: 2025-07-28

## 2025-07-28 RX ORDER — PROMETHAZINE HYDROCHLORIDE 25 MG/1
25 TABLET ORAL EVERY 8 HOURS PRN
Qty: 20 TABLET | Refills: 0 | Status: SHIPPED | OUTPATIENT
Start: 2025-07-28

## 2025-07-28 RX ORDER — HYDROCORTISONE 25 MG/G
CREAM TOPICAL
Qty: 28 G | Refills: 0 | Status: SHIPPED | OUTPATIENT
Start: 2025-07-28

## 2025-07-28 RX ORDER — PANTOPRAZOLE SODIUM 40 MG/1
40 TABLET, DELAYED RELEASE ORAL DAILY
Qty: 90 TABLET | Refills: 0 | Status: SHIPPED | OUTPATIENT
Start: 2025-07-28 | End: 2025-10-26

## 2025-08-14 DIAGNOSIS — R11.2 NAUSEA AND VOMITING, UNSPECIFIED VOMITING TYPE: ICD-10-CM

## 2025-08-19 DIAGNOSIS — R11.2 NAUSEA AND VOMITING, UNSPECIFIED VOMITING TYPE: ICD-10-CM

## 2025-08-19 RX ORDER — PROMETHAZINE HYDROCHLORIDE 25 MG/1
25 TABLET ORAL EVERY 8 HOURS PRN
Qty: 20 TABLET | Refills: 0 | OUTPATIENT
Start: 2025-08-19

## 2025-08-19 RX ORDER — PROMETHAZINE HYDROCHLORIDE 25 MG/1
25 TABLET ORAL EVERY 8 HOURS PRN
Qty: 20 TABLET | Refills: 0 | Status: SHIPPED | OUTPATIENT
Start: 2025-08-19

## 2025-08-24 DIAGNOSIS — Z91.038 ALLERGIC TO INSECT BITES: ICD-10-CM

## 2025-08-25 RX ORDER — HYDROCORTISONE 25 MG/G
CREAM TOPICAL
Qty: 28 G | Refills: 0 | Status: SHIPPED | OUTPATIENT
Start: 2025-08-25

## (undated) DEVICE — CONN JET HYDRA H20 AUXILIARY DISP

## (undated) DEVICE — SOLIDIFIER LIQLOC PLS 1500CC BT

## (undated) DEVICE — GLOVE,SURG,SENSICARE SLT,LF,PF,8.5: Brand: MEDLINE

## (undated) DEVICE — HYPODERMIC SAFETY NEEDLE: Brand: MONOJECT

## (undated) DEVICE — SOL IRRG H2O PL/BG 1000ML STRL

## (undated) DEVICE — SUT ETHLN 4/0 FS2 18IN 662H

## (undated) DEVICE — BNDG ESMARK 4IN 12FT LF STRL BLU

## (undated) DEVICE — DRSNG WND GZ CURAD OIL EMULSION 3X3IN STRL

## (undated) DEVICE — GLV SURG BIOGEL LTX PF 8 1/2

## (undated) DEVICE — GLOVE,SURG,SENSICARE SLT,LF,PF,7: Brand: MEDLINE

## (undated) DEVICE — INTENDED FOR TISSUE SEPARATION, AND OTHER PROCEDURES THAT REQUIRE A SHARP SURGICAL BLADE TO PUNCTURE OR CUT.: Brand: BARD-PARKER ® CARBON RIB-BACK BLADES

## (undated) DEVICE — Device: Brand: DEFENDO AIR/WATER/SUCTION AND BIOPSY VALVE

## (undated) DEVICE — EXTREMITY-LF: Brand: MEDLINE INDUSTRIES, INC.

## (undated) DEVICE — LINER SURG CANSTR SXN S/RIGD 1500CC

## (undated) DEVICE — GOWN,REINF,POLY,SIRUS,BRTH SLV,XLNG/XXL: Brand: MEDLINE

## (undated) DEVICE — Device

## (undated) DEVICE — GAUZE,SPONGE,4"X4",16PLY,STRL,LF,10/TRAY: Brand: MEDLINE

## (undated) DEVICE — BNDG GZ SOF-FORM CONFRM 2X75IN LF STRL

## (undated) DEVICE — BLCK/BITE BLOX WO/DENTL/RIM W/STRAP 54F

## (undated) DEVICE — GLV SURG SENSICARE PI PF LF 7 GRN STRL

## (undated) DEVICE — BNDG COMPR S/ADHR 1IN 5YD TN NS

## (undated) DEVICE — SINGLE-USE BIOPSY FORCEPS: Brand: RADIAL JAW 4